# Patient Record
Sex: FEMALE | Race: WHITE | HISPANIC OR LATINO | Employment: OTHER
[De-identification: names, ages, dates, MRNs, and addresses within clinical notes are randomized per-mention and may not be internally consistent; named-entity substitution may affect disease eponyms.]

---

## 2017-03-06 ENCOUNTER — RECORDS - HEALTHEAST (OUTPATIENT)
Dept: ADMINISTRATIVE | Facility: OTHER | Age: 52
End: 2017-03-06

## 2017-03-13 ENCOUNTER — RECORDS - HEALTHEAST (OUTPATIENT)
Dept: ADMINISTRATIVE | Facility: OTHER | Age: 52
End: 2017-03-13

## 2017-03-14 ENCOUNTER — AMBULATORY - HEALTHEAST (OUTPATIENT)
Dept: SURGERY | Facility: CLINIC | Age: 52
End: 2017-03-14

## 2017-03-14 ENCOUNTER — OFFICE VISIT - HEALTHEAST (OUTPATIENT)
Dept: SURGERY | Facility: CLINIC | Age: 52
End: 2017-03-14

## 2017-03-14 DIAGNOSIS — K80.20 CHOLELITHIASIS: ICD-10-CM

## 2017-03-14 DIAGNOSIS — R79.89 ABNORMAL LFTS (LIVER FUNCTION TESTS): ICD-10-CM

## 2017-03-14 DIAGNOSIS — K80.10 CALCULUS OF GALLBLADDER WITH CHRONIC CHOLECYSTITIS WITHOUT OBSTRUCTION: ICD-10-CM

## 2017-03-15 ASSESSMENT — MIFFLIN-ST. JEOR: SCORE: 1087.14

## 2017-03-16 ENCOUNTER — ANESTHESIA - HEALTHEAST (OUTPATIENT)
Dept: SURGERY | Facility: HOSPITAL | Age: 52
End: 2017-03-16

## 2017-03-16 ENCOUNTER — SURGERY - HEALTHEAST (OUTPATIENT)
Dept: SURGERY | Facility: HOSPITAL | Age: 52
End: 2017-03-16

## 2017-03-22 ENCOUNTER — COMMUNICATION - HEALTHEAST (OUTPATIENT)
Dept: SURGERY | Facility: CLINIC | Age: 52
End: 2017-03-22

## 2017-03-23 ENCOUNTER — OFFICE VISIT - HEALTHEAST (OUTPATIENT)
Dept: SURGERY | Facility: CLINIC | Age: 52
End: 2017-03-23

## 2017-03-23 DIAGNOSIS — K74.60 CIRRHOSIS (H): ICD-10-CM

## 2017-03-23 DIAGNOSIS — Z48.89 POSTOPERATIVE VISIT: ICD-10-CM

## 2017-03-23 DIAGNOSIS — R10.11 RUQ ABDOMINAL PAIN: ICD-10-CM

## 2017-03-24 ENCOUNTER — COMMUNICATION - HEALTHEAST (OUTPATIENT)
Dept: SURGERY | Facility: CLINIC | Age: 52
End: 2017-03-24

## 2017-03-24 ENCOUNTER — RECORDS - HEALTHEAST (OUTPATIENT)
Dept: ADMINISTRATIVE | Facility: OTHER | Age: 52
End: 2017-03-24

## 2017-03-24 ENCOUNTER — HOSPITAL ENCOUNTER (OUTPATIENT)
Dept: ULTRASOUND IMAGING | Facility: HOSPITAL | Age: 52
Discharge: HOME | End: 2017-03-24
Attending: PHYSICIAN ASSISTANT

## 2017-03-24 DIAGNOSIS — K74.60 CIRRHOSIS (H): ICD-10-CM

## 2017-03-24 DIAGNOSIS — G89.18 POST-OP PAIN: ICD-10-CM

## 2017-03-27 ENCOUNTER — COMMUNICATION - HEALTHEAST (OUTPATIENT)
Dept: SURGERY | Facility: CLINIC | Age: 52
End: 2017-03-27

## 2017-03-27 ENCOUNTER — OFFICE VISIT - HEALTHEAST (OUTPATIENT)
Dept: SURGERY | Facility: CLINIC | Age: 52
End: 2017-03-27

## 2017-03-27 DIAGNOSIS — K74.60 CIRRHOSIS (H): ICD-10-CM

## 2017-03-27 DIAGNOSIS — K80.10 CALCULUS OF GALLBLADDER WITH CHRONIC CHOLECYSTITIS WITHOUT OBSTRUCTION: ICD-10-CM

## 2017-03-27 DIAGNOSIS — Z48.89 POSTOPERATIVE VISIT: ICD-10-CM

## 2017-04-07 ASSESSMENT — MIFFLIN-ST. JEOR: SCORE: 1066.73

## 2017-04-11 ENCOUNTER — SURGERY - HEALTHEAST (OUTPATIENT)
Dept: GASTROENTEROLOGY | Facility: CLINIC | Age: 52
End: 2017-04-11

## 2017-04-11 ASSESSMENT — MIFFLIN-ST. JEOR: SCORE: 1095.31

## 2017-05-26 ENCOUNTER — OFFICE VISIT - HEALTHEAST (OUTPATIENT)
Dept: SURGERY | Facility: CLINIC | Age: 52
End: 2017-05-26

## 2018-02-08 ENCOUNTER — SURGERY - HEALTHEAST (OUTPATIENT)
Dept: GASTROENTEROLOGY | Facility: CLINIC | Age: 53
End: 2018-02-08

## 2018-02-08 ASSESSMENT — MIFFLIN-ST. JEOR
SCORE: 1120.71
SCORE: 1109.37
SCORE: 1134.32

## 2018-02-20 ENCOUNTER — RECORDS - HEALTHEAST (OUTPATIENT)
Dept: LAB | Facility: CLINIC | Age: 53
End: 2018-02-20

## 2018-02-20 LAB
ALBUMIN SERPL-MCNC: 3.1 G/DL (ref 3.5–5)
ALP SERPL-CCNC: 123 U/L (ref 45–120)
ALT SERPL W P-5'-P-CCNC: 24 U/L (ref 0–45)
ANION GAP SERPL CALCULATED.3IONS-SCNC: 10 MMOL/L (ref 5–18)
AST SERPL W P-5'-P-CCNC: 58 U/L (ref 0–40)
BILIRUB SERPL-MCNC: 0.7 MG/DL (ref 0–1)
BUN SERPL-MCNC: 13 MG/DL (ref 8–22)
CALCIUM SERPL-MCNC: 8.3 MG/DL (ref 8.5–10.5)
CHLORIDE BLD-SCNC: 106 MMOL/L (ref 98–107)
CO2 SERPL-SCNC: 24 MMOL/L (ref 22–31)
CREAT SERPL-MCNC: 0.88 MG/DL (ref 0.6–1.1)
GFR SERPL CREATININE-BSD FRML MDRD: >60 ML/MIN/1.73M2
GLUCOSE BLD-MCNC: 109 MG/DL (ref 70–125)
MAGNESIUM SERPL-MCNC: 1.7 MG/DL (ref 1.8–2.6)
POTASSIUM BLD-SCNC: 3.6 MMOL/L (ref 3.5–5)
PROT SERPL-MCNC: 6.9 G/DL (ref 6–8)
SODIUM SERPL-SCNC: 140 MMOL/L (ref 136–145)

## 2018-03-23 ENCOUNTER — RECORDS - HEALTHEAST (OUTPATIENT)
Dept: ADMINISTRATIVE | Facility: OTHER | Age: 53
End: 2018-03-23

## 2018-04-02 ENCOUNTER — HOSPITAL ENCOUNTER (OUTPATIENT)
Dept: ULTRASOUND IMAGING | Facility: CLINIC | Age: 53
Discharge: HOME | End: 2018-04-02
Admitting: RADIOLOGY

## 2018-04-02 ENCOUNTER — COMMUNICATION - HEALTHEAST (OUTPATIENT)
Dept: TELEHEALTH | Facility: CLINIC | Age: 53
End: 2018-04-02

## 2018-04-02 DIAGNOSIS — R18.8 CIRRHOSIS OF LIVER WITH ASCITES, UNSPECIFIED HEPATIC CIRRHOSIS TYPE (H): ICD-10-CM

## 2018-04-02 DIAGNOSIS — K74.60 CIRRHOSIS OF LIVER WITH ASCITES, UNSPECIFIED HEPATIC CIRRHOSIS TYPE (H): ICD-10-CM

## 2018-04-10 ENCOUNTER — RECORDS - HEALTHEAST (OUTPATIENT)
Dept: LAB | Facility: CLINIC | Age: 53
End: 2018-04-10

## 2018-04-10 LAB
ALBUMIN SERPL-MCNC: 3.7 G/DL (ref 3.5–5)
ALP SERPL-CCNC: 141 U/L (ref 45–120)
ALT SERPL W P-5'-P-CCNC: 38 U/L (ref 0–45)
AST SERPL W P-5'-P-CCNC: 128 U/L (ref 0–40)
BILIRUB DIRECT SERPL-MCNC: 0.6 MG/DL
BILIRUB SERPL-MCNC: 1.6 MG/DL (ref 0–1)
PROT SERPL-MCNC: 7.4 G/DL (ref 6–8)

## 2018-05-07 ENCOUNTER — RECORDS - HEALTHEAST (OUTPATIENT)
Dept: ADMINISTRATIVE | Facility: OTHER | Age: 53
End: 2018-05-07

## 2018-05-09 ENCOUNTER — HOSPITAL ENCOUNTER (OUTPATIENT)
Dept: ULTRASOUND IMAGING | Facility: HOSPITAL | Age: 53
Discharge: HOME | End: 2018-05-09
Admitting: RADIOLOGY

## 2018-05-09 DIAGNOSIS — K70.31 ALCOHOLIC CIRRHOSIS OF LIVER WITH ASCITES (H): ICD-10-CM

## 2018-05-25 ENCOUNTER — AMBULATORY - HEALTHEAST (OUTPATIENT)
Dept: SCHEDULING | Facility: CLINIC | Age: 53
End: 2018-05-25

## 2018-05-25 DIAGNOSIS — K70.31 ALCOHOLIC CIRRHOSIS OF LIVER WITH ASCITES (H): ICD-10-CM

## 2018-05-31 ENCOUNTER — HOSPITAL ENCOUNTER (OUTPATIENT)
Dept: ULTRASOUND IMAGING | Facility: CLINIC | Age: 53
Discharge: HOME | End: 2018-05-31
Admitting: RADIOLOGY

## 2018-05-31 DIAGNOSIS — K70.31 ALCOHOLIC CIRRHOSIS OF LIVER WITH ASCITES (H): ICD-10-CM

## 2018-06-19 ENCOUNTER — RECORDS - HEALTHEAST (OUTPATIENT)
Dept: ADMINISTRATIVE | Facility: OTHER | Age: 53
End: 2018-06-19

## 2018-06-20 ENCOUNTER — RECORDS - HEALTHEAST (OUTPATIENT)
Dept: LAB | Facility: CLINIC | Age: 53
End: 2018-06-20

## 2018-06-20 ENCOUNTER — HOSPITAL ENCOUNTER (OUTPATIENT)
Dept: ULTRASOUND IMAGING | Facility: HOSPITAL | Age: 53
Discharge: HOME | End: 2018-06-20
Admitting: RADIOLOGY

## 2018-06-20 DIAGNOSIS — K70.31 ALCOHOLIC CIRRHOSIS OF LIVER WITH ASCITES (H): ICD-10-CM

## 2018-06-20 DIAGNOSIS — B18.2 CHRONIC HEPATITIS C WITHOUT HEPATIC COMA (H): ICD-10-CM

## 2018-06-20 DIAGNOSIS — E87.6 HYPOKALEMIA: ICD-10-CM

## 2018-06-20 LAB
ERYTHROCYTE [DISTWIDTH] IN BLOOD BY AUTOMATED COUNT: 19.5 % (ref 11–14.5)
HCT VFR BLD AUTO: 33.1 % (ref 35–47)
HGB BLD-MCNC: 10.9 G/DL (ref 12–16)
MCH RBC QN AUTO: 29 PG (ref 27–34)
MCHC RBC AUTO-ENTMCNC: 32.9 G/DL (ref 32–36)
MCV RBC AUTO: 88 FL (ref 80–100)
PLATELET # BLD AUTO: 84 THOU/UL (ref 140–440)
PMV BLD AUTO: 11 FL (ref 8.5–12.5)
RBC # BLD AUTO: 3.76 MILL/UL (ref 3.8–5.4)
WBC: 4.4 THOU/UL (ref 4–11)

## 2018-07-17 ENCOUNTER — HOSPITAL ENCOUNTER (OUTPATIENT)
Dept: ULTRASOUND IMAGING | Facility: HOSPITAL | Age: 53
Discharge: HOME | End: 2018-07-17
Admitting: RADIOLOGY

## 2018-07-17 DIAGNOSIS — K70.31 ALCOHOLIC CIRRHOSIS OF LIVER WITH ASCITES (H): ICD-10-CM

## 2018-08-13 ENCOUNTER — AMBULATORY - HEALTHEAST (OUTPATIENT)
Dept: SCHEDULING | Facility: CLINIC | Age: 53
End: 2018-08-13

## 2018-08-13 DIAGNOSIS — K70.31 ALCOHOLIC CIRRHOSIS OF LIVER WITH ASCITES (H): ICD-10-CM

## 2018-08-17 ENCOUNTER — HOSPITAL ENCOUNTER (OUTPATIENT)
Dept: ULTRASOUND IMAGING | Facility: HOSPITAL | Age: 53
Discharge: HOME | End: 2018-08-17
Admitting: RADIOLOGY

## 2018-08-17 DIAGNOSIS — K70.31 ALCOHOLIC CIRRHOSIS OF LIVER WITH ASCITES (H): ICD-10-CM

## 2018-09-13 ENCOUNTER — RECORDS - HEALTHEAST (OUTPATIENT)
Dept: ADMINISTRATIVE | Facility: OTHER | Age: 53
End: 2018-09-13

## 2018-09-14 ENCOUNTER — HOSPITAL ENCOUNTER (OUTPATIENT)
Dept: ULTRASOUND IMAGING | Facility: CLINIC | Age: 53
Discharge: HOME | End: 2018-09-14

## 2018-09-14 ENCOUNTER — COMMUNICATION - HEALTHEAST (OUTPATIENT)
Dept: TELEHEALTH | Facility: CLINIC | Age: 53
End: 2018-09-14

## 2018-09-14 DIAGNOSIS — K74.60 CIRRHOSIS (H): ICD-10-CM

## 2018-09-14 DIAGNOSIS — K70.31 ALCOHOLIC CIRRHOSIS OF LIVER WITH ASCITES (H): ICD-10-CM

## 2018-09-14 LAB — AFP SERPL-MCNC: 5.4 UG/ML

## 2018-12-12 ENCOUNTER — RECORDS - HEALTHEAST (OUTPATIENT)
Dept: ADMINISTRATIVE | Facility: OTHER | Age: 53
End: 2018-12-12

## 2018-12-13 ENCOUNTER — HOSPITAL ENCOUNTER (OUTPATIENT)
Dept: RADIOLOGY | Facility: CLINIC | Age: 53
Discharge: HOME | End: 2018-12-13

## 2018-12-13 ENCOUNTER — HOSPITAL ENCOUNTER (OUTPATIENT)
Dept: ULTRASOUND IMAGING | Facility: CLINIC | Age: 53
Discharge: HOME | End: 2018-12-13

## 2018-12-13 DIAGNOSIS — K70.31 ALCOHOLIC CIRRHOSIS OF LIVER WITH ASCITES (H): ICD-10-CM

## 2018-12-13 DIAGNOSIS — R07.81 RIB PAIN ON RIGHT SIDE: ICD-10-CM

## 2018-12-28 ENCOUNTER — RECORDS - HEALTHEAST (OUTPATIENT)
Dept: LAB | Facility: CLINIC | Age: 53
End: 2018-12-28

## 2018-12-28 LAB — HBA1C MFR BLD: 5.1 % (ref 4.2–6.1)

## 2019-02-22 ENCOUNTER — RECORDS - HEALTHEAST (OUTPATIENT)
Dept: LAB | Facility: CLINIC | Age: 54
End: 2019-02-22

## 2019-02-22 LAB
ALBUMIN SERPL-MCNC: 4.1 G/DL (ref 3.5–5)
ALP SERPL-CCNC: 108 U/L (ref 45–120)
ALT SERPL W P-5'-P-CCNC: 19 U/L (ref 0–45)
ANION GAP SERPL CALCULATED.3IONS-SCNC: 12 MMOL/L (ref 5–18)
AST SERPL W P-5'-P-CCNC: 37 U/L (ref 0–40)
BILIRUB SERPL-MCNC: 0.7 MG/DL (ref 0–1)
BUN SERPL-MCNC: 14 MG/DL (ref 8–22)
C REACTIVE PROTEIN LHE: <0.1 MG/DL (ref 0–0.8)
CALCIUM SERPL-MCNC: 9.2 MG/DL (ref 8.5–10.5)
CHLORIDE BLD-SCNC: 108 MMOL/L (ref 98–107)
CO2 SERPL-SCNC: 21 MMOL/L (ref 22–31)
CREAT SERPL-MCNC: 0.68 MG/DL (ref 0.6–1.1)
GFR SERPL CREATININE-BSD FRML MDRD: >60 ML/MIN/1.73M2
GLUCOSE BLD-MCNC: 96 MG/DL (ref 70–125)
POTASSIUM BLD-SCNC: 3.9 MMOL/L (ref 3.5–5)
PROT SERPL-MCNC: 7.4 G/DL (ref 6–8)
RHEUMATOID FACT SERPL-ACNC: 54.9 IU/ML (ref 0–30)
SODIUM SERPL-SCNC: 141 MMOL/L (ref 136–145)

## 2019-02-25 LAB — ANA SER QL: 4.2 U

## 2019-02-26 LAB
DNA (DS) ANTIBODY - HISTORICAL: 16 IU
JO-1 AUTOANTIBODIES - HISTORICAL: 1 EU
SCL-70 AUTOANTIBODIES - HISTORICAL: 1 EU
SM (SMITH AUTOANTIBODIES - HISTORICAL: 3 EU
SM/RNP AUTOANTIBODIES - HISTORICAL: 1 EU
SS-A/RO AUTOANTIBODIES - HISTORICAL: 17 EU
SS-B/LA AUTOANTIBODIES - HISTORICAL: 0 EU

## 2019-03-22 ENCOUNTER — RECORDS - HEALTHEAST (OUTPATIENT)
Dept: LAB | Facility: CLINIC | Age: 54
End: 2019-03-22

## 2019-03-22 LAB — AMMONIA PLAS-SCNC: 57 UMOL/L (ref 11–35)

## 2019-04-24 ENCOUNTER — AMBULATORY - HEALTHEAST (OUTPATIENT)
Dept: SCHEDULING | Facility: CLINIC | Age: 54
End: 2019-04-24

## 2019-04-24 DIAGNOSIS — K70.31 ALCOHOLIC CIRRHOSIS OF LIVER WITH ASCITES (H): ICD-10-CM

## 2019-05-02 ENCOUNTER — HOSPITAL ENCOUNTER (OUTPATIENT)
Dept: ULTRASOUND IMAGING | Facility: CLINIC | Age: 54
Discharge: HOME | End: 2019-05-02

## 2019-05-02 DIAGNOSIS — K70.31 ALCOHOLIC CIRRHOSIS OF LIVER WITH ASCITES (H): ICD-10-CM

## 2019-05-20 ENCOUNTER — HOSPITAL ENCOUNTER (OUTPATIENT)
Dept: ULTRASOUND IMAGING | Facility: CLINIC | Age: 54
Discharge: HOME | End: 2019-05-20

## 2019-05-20 DIAGNOSIS — K70.31 ALCOHOLIC CIRRHOSIS OF LIVER WITH ASCITES (H): ICD-10-CM

## 2020-10-16 ENCOUNTER — RECORDS - HEALTHEAST (OUTPATIENT)
Dept: LAB | Facility: CLINIC | Age: 55
End: 2020-10-16

## 2020-10-16 LAB — AMMONIA PLAS-SCNC: 98 UMOL/L (ref 11–35)

## 2021-05-30 VITALS — WEIGHT: 116.6 LBS | BODY MASS INDEX: 21.46 KG/M2 | HEIGHT: 62 IN

## 2021-05-30 VITALS — BODY MASS INDEX: 21.79 KG/M2 | WEIGHT: 118.4 LBS | HEIGHT: 62 IN

## 2021-05-30 VITALS — WEIGHT: 120.7 LBS

## 2021-05-31 VITALS — HEIGHT: 62 IN | BODY MASS INDEX: 22.36 KG/M2 | WEIGHT: 121.5 LBS

## 2021-06-09 NOTE — PROGRESS NOTES
HPI:  This is a 51-year-old woman who I am asked to see by Dr. Yasmin Figueroa  for evaluation of abdominal pain. The pain is located in the epigastric region to right upper quadrant with radiation to back.  Pain is described as dull.  She has had on and off symptoms going back 8 years but over the last 2 weeks the pain has been fairly constant.  Aggravating factors include eating.  She denies any fevers.  She also states that she has been itchy.  She has noticed that her urine is dark in her stools have become more of a yellow versus brown color.  On an ultrasound they see stones in her gallbladder and a normal common bile duct.  Her bilirubin is elevated to 2.2.    Please see Chart Review for PMH, medication list, allergies, FH and social history.  Past Medical History:   Diagnosis Date     Anxiety      Hypertension      UTI (urinary tract infection)     currently on antibiotic       Current Outpatient Prescriptions:      amLODIPine (NORVASC) 5 MG tablet, Take 5 mg by mouth daily., Disp: , Rfl:      ciprofloxacin HCl (CIPRO) 250 MG tablet, Take 250 mg by mouth 2 (two) times a day., Disp: , Rfl:      gabapentin (NEURONTIN) 100 MG capsule, , Disp: , Rfl: 0     gabapentin (NEURONTIN) 100 MG capsule, Take 100 mg by mouth 2 (two) times a day as needed., Disp: , Rfl:      sertraline (ZOLOFT) 100 MG tablet, Take 100 mg by mouth bedtime., Disp: , Rfl:   No Known Allergies          A 12 point comprehensive review of systems was negative except as noted.    Physical Exam:  Visit Vitals     BP (!) 168/100     Wt 120 lb 11.2 oz (54.7 kg)     SpO2 98%     Breastfeeding No       General:alert, appears stated age and cooperative  Eyes: No obvious scleral icterus.  Lungs: clear to auscultation bilaterally  CV:regular rate and rhythm, S1, S2 normal, no murmur, click, rub or gallop  Abdomen:Soft, mild tenderness in the right upper quadrant but no guarding or rebound.  Neuro: Grossly normal    Studies:  No results found for: WBC,  HGB, HCT, MCV, PLT  Lab Results   Component Value Date    ALT 45 03/06/2017    AST 78 (H) 03/06/2017    ALKPHOS 141 (H) 03/06/2017    BILITOT 2.2 (H) 03/06/2017         Impression:    Cholelithiasis with probable chronic cholecystitis but I am very worried about choledocholithiasis.  Because her common bile duct is normal on the ultrasound and her bilirubin is only mildly elevated, I doubt very much that gastroenterology will be interested in doing an ERCP first.  I do not think the MRCP is accurate enough so I would recommend just going ahead with a lap mp but doing an intraoperative cholangiogram.  Explained that if it does show a stone in the common bile duct and she would likely need to be admitted for an ERCP.  If the common bile duct is normal then this could be done as an outpatient.  Risks of surgery including beeding, infection, bile leak and common bile duct injury were explained along with potential benefits. Appropriate questions were asked and answered and they wish to proceed. Typically and outpatient procedure.    Plan:  Laparoscopic Cholecystectomy.  My schedule unfortunately will not allow me to do this for at least a couple weeks.  Dr. Maximiliano Ramirez has agreed to do the procedure.  I have discussed the patient with him.  I explained this to the patient and explained that this needs to be done sooner than later.  We plan to get her on the schedule this week.

## 2021-06-09 NOTE — PROGRESS NOTES
HPI: Pt is here for follow up of a lap cholecystectomy and cholangiogram done by Dr Ramirez  3/17/17. Having increasing pain ruq . Pain constant. Worse with movement. No problems with constipation, diarrhea, fever chills, or eating. Having a lot of drainage out of her upper midline port. During her surgery it was found that she had a cirrhotic liver. Prior to this she went to treatment for alcoholism last November. Her liver function tests were normal. Has not drank since.     BP (!) 144/97 (Patient Site: Left Arm, Patient Position: Sitting, Cuff Size: Adult Regular)  Pulse 81  SpO2 99%  Breastfeeding? No    EXAM:  GENERAL:Appears well  ABDOMEN:  Distended. Tender ruq. Mild guarding. No rebound  SURGICAL WOUNDS:  Incisions healing well, no enduration or drainage.- ascites draining out edge of her upper midline incision    .lastlab[CASEREPORT    Assessment/Plan: .RUQ ultrasound. Lfts, paracentesis ordered. Ok tramadol for pain. GI consult for liver specialist.   Fariha Chavez PA-C  [facility] Department of Surgery

## 2021-06-09 NOTE — ANESTHESIA CARE TRANSFER NOTE
Last vitals:   Vitals:    03/16/17 1320   BP: 142/85   Pulse: 80   Resp: 20   Temp: 36.4  C (97.6  F)   SpO2: 99%     Patient's level of consciousness is drowsy  Spontaneous respirations: yes  Maintains airway independently: yes  Dentition unchanged: yes  Oropharynx: oropharynx clear of all foreign objects    QCDR Measures:  ASA# 20 - Surgical Safety Checklist: ASA20A - Safety Checks Done  PQRS# 430 - Adult PONV Prevention: 4558F - Pt received => 2 anti-emetic agents (different classes) preop & intraop  ASA# 8 - Peds PONV Prevention: NA - Not pediatric patient, not GA or 2 or more risk factors NOT present  PQRS# 424 - Shalini-op Temp Management: 4559F - At least one body temp DOCUMENTED => 35.5C or 95.9F within required timeframe  PQRS# 426 - PACU Transfer Protocol: - Transfer of care checklist used  ASA# 14 - Acute Post-op Pain: ASA14B - Patient did NOT experience pain >= 7 out of 10    I completed my SBAR handoff to the receiving nurse per policy and procedure.

## 2021-06-09 NOTE — ANESTHESIA POSTPROCEDURE EVALUATION
Patient: Lori Jean-Baptiste  LAPAROSCOPIC CHOLECYSTECTOMY, , INTRA-OPERATIVE CHOLANGIOGRAMS  Anesthesia type: general    Patient location: PACU  Last vitals:   Vitals:    03/16/17 1330   BP: 131/75   Pulse: 79   Resp: 18   Temp:    SpO2: 99%     Post vital signs: stable  Level of consciousness: awake and responds to simple questions  Post-anesthesia pain: pain controlled  Post-anesthesia nausea and vomiting: no  Pulmonary: unassisted, return to baseline  Cardiovascular: stable and blood pressure at baseline  Hydration: adequate  Anesthetic events: no    QCDR Measures:  ASA# 11 - Shalini-op Cardiac Arrest: ASA11B - Patient did NOT experience unanticipated cardiac arrest  ASA# 12 - Shalini-op Mortality Rate: ASA12B - Patient did NOT die  ASA# 13 - PACU Re-Intubation Rate: ASA13B - Patient did NOT require a new airway mgmt  ASA# 10 - Composite Anes Safety: ASA10A - No serious adverse event  ASA# 38 - New Corneal Injury: ASA38A - No new exposure keratitis or corneal abrasion in PACU    Additional Notes:

## 2021-06-09 NOTE — PROGRESS NOTES
Scheduled pt for lap mp with intraoperative cholangiograms with Dr Jesus Ramirez at [place] 3/16/17.   Per Dr Gonzales, pt saw Dr Figueroa ([facility]) last week and we can use that as H&P, reviewed preop instructions including NPO 8hrs prior,  need for  and adult to stay with pt for first 24 hrs postop.  Pt verbalized understanding and had no further questions.  Encouraged to call if needed.

## 2021-06-09 NOTE — ANESTHESIA PREPROCEDURE EVALUATION
Anesthesia Evaluation      Patient summary reviewed   No history of anesthetic complications     Airway   Mallampati: II  Neck ROM: full   Pulmonary - normal exam    breath sounds clear to auscultation  (+) a smoker  (-) shortness of breath, sleep apnea                         Cardiovascular   Exercise tolerance: > or = 4 METS  (+) hypertension, ,     (-) angina, murmur  Rhythm: regular  Rate: normal,    no murmur      Neuro/Psych    (+) anxiety/panic attacks,     Endo/Other - negative ROS   (-) not pregnant     GI/Hepatic/Renal    (+) GERD intermittent and well controlled,             Dental - normal exam                        Anesthesia Plan  Planned anesthetic: general endotracheal    ASA 2   Induction: intravenous   Anesthetic plan and risks discussed with: patient and child/children  Anesthesia plan special considerations: antiemetics,   Post-op plan: routine recovery

## 2021-06-09 NOTE — PROGRESS NOTES
HPI: Pt is here for follow up of a lap cholecystectomy with post operative ascites due to cirrhosis. .   she is doing well.  Pain is well controlled.  No difficulties with the surgical wound/wounds.  she is eating well and denies fever and chills.   Is still having significant drainage of ascites fluid.       /78  Pulse 82  Temp 96.7  F (35.9  C)  SpO2 99%    EXAM:  GENERAL:Appears well  ABDOMEN:  Soft, +BS  SURGICAL WOUNDS:  Incisions healing well, no enduration or drainage.    .lastlab[CASEREPORT    Assessment/Plan: . Doing well after surgery and should follow up as needed. Reviewed her labs and ultrasound. Recommended GI referral.   Fariha Chavez PA-C  [facility] Department of Surgery

## 2021-06-10 NOTE — PROGRESS NOTES
Lori Jean-Baptiste is status post laparoscopic cholecystectomy. She is doing well but notes a small fluid-filled collection below her subxiphoid port site incision.  She denies any purulent drainage, fevers, chills or any other symptoms.    EXAM:  /85 (Patient Site: Right Arm, Patient Position: Sitting, Cuff Size: Adult Regular)  Pulse 63  SpO2 97%  Breastfeeding? No  GENERAL: Well developed female, No acute distress, pleasant and conversant   EYES: Pupils equal, round and reactive, no scleral icterus  ABDOMEN: Soft, nontender, small 3 cm fluid-filled region below subxiphoid port site incision, no cellulitis or stigmata of infection  SKIN: Pink, warm and dry, no obvious rashes or lesions   NEURO:No focal deficits, ambulatory  MUSCULOSKELETAL:No obvious deformities, no swelling, normal appearing        ASSESSMENT AND PLAN:  Lori Jean-Baptiste is doing well postoperatively.  I have drained a small seroma using local anesthetic and scalpel.  Several cc of clear fluid were expressed and the wound was packed.  Her  was present and educated on daily dressing changes and I expect the wound to heal without complications.  She will follow-up with me in 2 weeks to ensure adequate closure.    Maximiliano Ramirez D.O. FACS  [phone]  [facility] Department of Surgery

## 2021-06-15 PROBLEM — D68.9 COAGULOPATHY (H): Status: ACTIVE | Noted: 2017-04-07

## 2021-06-15 PROBLEM — K70.31 ALCOHOLIC CIRRHOSIS OF LIVER WITH ASCITES (H): Status: ACTIVE | Noted: 2017-04-07

## 2021-06-15 PROBLEM — K56.1 INTUSSUSCEPTION OF SMALL BOWEL (H): Status: ACTIVE | Noted: 2017-04-07

## 2021-06-15 PROBLEM — R10.84 ACUTE GENERALIZED ABDOMINAL PAIN: Status: ACTIVE | Noted: 2017-04-07

## 2021-06-16 PROBLEM — K92.0 GASTROINTESTINAL HEMORRHAGE WITH HEMATEMESIS: Status: ACTIVE | Noted: 2018-02-08

## 2021-06-16 PROBLEM — K92.0 HEMATEMESIS: Status: ACTIVE | Noted: 2018-02-08

## 2021-06-19 NOTE — PROGRESS NOTES
Pt arrived via ambulation s/p para with 3.9 removed per tech. No compaints offered. Pt here for albumin replacement

## 2021-07-03 NOTE — ADDENDUM NOTE
Addendum Note by Solitario Kent at 9/14/2018 10:01 AM     Author: Solitario Kent Service: -- Author Type:     Filed: 9/14/2018 10:01 AM Encounter Date: 9/14/2018 Status: Signed    : Solitario Kent ()    Addended by: SOLITARIO KENT on: 9/14/2018 10:01 AM        Modules accepted: Orders

## 2021-08-03 PROBLEM — K56.609 SMALL BOWEL OBSTRUCTION (H): Status: RESOLVED | Noted: 2017-04-07 | Resolved: 2017-04-12

## 2021-08-11 ENCOUNTER — LAB REQUISITION (OUTPATIENT)
Dept: LAB | Facility: CLINIC | Age: 56
End: 2021-08-11
Payer: COMMERCIAL

## 2021-08-11 DIAGNOSIS — R35.0 FREQUENCY OF MICTURITION: ICD-10-CM

## 2021-08-11 PROCEDURE — 87088 URINE BACTERIA CULTURE: CPT | Mod: ORL | Performed by: PHYSICIAN ASSISTANT

## 2021-08-15 LAB — BACTERIA UR CULT: ABNORMAL

## 2021-12-29 ENCOUNTER — LAB REQUISITION (OUTPATIENT)
Dept: LAB | Facility: CLINIC | Age: 56
End: 2021-12-29
Payer: COMMERCIAL

## 2021-12-29 DIAGNOSIS — I10 ESSENTIAL (PRIMARY) HYPERTENSION: ICD-10-CM

## 2021-12-29 DIAGNOSIS — K70.31 ALCOHOLIC CIRRHOSIS OF LIVER WITH ASCITES (H): ICD-10-CM

## 2021-12-29 PROCEDURE — 80053 COMPREHEN METABOLIC PANEL: CPT | Mod: ORL | Performed by: PHYSICIAN ASSISTANT

## 2021-12-29 PROCEDURE — 83690 ASSAY OF LIPASE: CPT | Mod: ORL | Performed by: PHYSICIAN ASSISTANT

## 2021-12-30 LAB
ALBUMIN SERPL-MCNC: 3.4 G/DL (ref 3.5–5)
ALP SERPL-CCNC: 109 U/L (ref 45–120)
ALT SERPL W P-5'-P-CCNC: 30 U/L (ref 0–45)
ANION GAP SERPL CALCULATED.3IONS-SCNC: 14 MMOL/L (ref 5–18)
AST SERPL W P-5'-P-CCNC: 84 U/L (ref 0–40)
BILIRUB SERPL-MCNC: 1.2 MG/DL (ref 0–1)
BUN SERPL-MCNC: 24 MG/DL (ref 8–22)
CALCIUM SERPL-MCNC: 9.3 MG/DL (ref 8.5–10.5)
CHLORIDE BLD-SCNC: 105 MMOL/L (ref 98–107)
CO2 SERPL-SCNC: 21 MMOL/L (ref 22–31)
CREAT SERPL-MCNC: 0.7 MG/DL (ref 0.6–1.1)
GFR SERPL CREATININE-BSD FRML MDRD: >90 ML/MIN/1.73M2
GLUCOSE BLD-MCNC: 117 MG/DL (ref 70–125)
LIPASE SERPL-CCNC: 20 U/L (ref 0–52)
POTASSIUM BLD-SCNC: 4.2 MMOL/L (ref 3.5–5)
PROT SERPL-MCNC: 6.4 G/DL (ref 6–8)
SODIUM SERPL-SCNC: 140 MMOL/L (ref 136–145)

## 2022-01-01 ENCOUNTER — LAB REQUISITION (OUTPATIENT)
Dept: LAB | Facility: CLINIC | Age: 57
End: 2022-01-01
Payer: MEDICARE

## 2022-01-01 DIAGNOSIS — K70.31 ALCOHOLIC CIRRHOSIS OF LIVER WITH ASCITES (H): ICD-10-CM

## 2022-01-01 LAB
ALBUMIN SERPL BCG-MCNC: 2.8 G/DL (ref 3.5–5.2)
ALP SERPL-CCNC: 201 U/L (ref 35–104)
ALT SERPL W P-5'-P-CCNC: 62 U/L (ref 10–35)
ANION GAP SERPL CALCULATED.3IONS-SCNC: 13 MMOL/L (ref 7–15)
AST SERPL W P-5'-P-CCNC: 151 U/L (ref 10–35)
BILIRUB SERPL-MCNC: 6.2 MG/DL
BUN SERPL-MCNC: 5.9 MG/DL (ref 6–20)
CALCIUM SERPL-MCNC: 8.1 MG/DL (ref 8.6–10)
CHLORIDE SERPL-SCNC: 93 MMOL/L (ref 98–107)
CREAT SERPL-MCNC: 0.67 MG/DL (ref 0.51–0.95)
DEPRECATED HCO3 PLAS-SCNC: 27 MMOL/L (ref 22–29)
GFR SERPL CREATININE-BSD FRML MDRD: >90 ML/MIN/1.73M2
GLUCOSE SERPL-MCNC: 124 MG/DL (ref 70–99)
POTASSIUM SERPL-SCNC: 3.1 MMOL/L (ref 3.4–5.3)
PROT SERPL-MCNC: 5.7 G/DL (ref 6.4–8.3)
SODIUM SERPL-SCNC: 133 MMOL/L (ref 136–145)

## 2022-01-01 PROCEDURE — 80053 COMPREHEN METABOLIC PANEL: CPT | Mod: ORL | Performed by: PHYSICIAN ASSISTANT

## 2022-06-29 ENCOUNTER — LAB REQUISITION (OUTPATIENT)
Dept: LAB | Facility: CLINIC | Age: 57
End: 2022-06-29
Payer: MEDICARE

## 2022-06-29 DIAGNOSIS — K70.31 ALCOHOLIC CIRRHOSIS OF LIVER WITH ASCITES (H): ICD-10-CM

## 2022-06-29 LAB
ALBUMIN SERPL BCG-MCNC: 2.9 G/DL (ref 3.5–5.2)
ALP SERPL-CCNC: 118 U/L (ref 35–104)
ALT SERPL W P-5'-P-CCNC: 39 U/L (ref 10–35)
ANION GAP SERPL CALCULATED.3IONS-SCNC: 11 MMOL/L (ref 7–15)
AST SERPL W P-5'-P-CCNC: 63 U/L (ref 10–35)
BILIRUB SERPL-MCNC: 1.3 MG/DL
BUN SERPL-MCNC: 6.1 MG/DL (ref 6–20)
CALCIUM SERPL-MCNC: 7.7 MG/DL (ref 8.6–10)
CHLORIDE SERPL-SCNC: 102 MMOL/L (ref 98–107)
CREAT SERPL-MCNC: 0.73 MG/DL (ref 0.51–0.95)
DEPRECATED HCO3 PLAS-SCNC: 23 MMOL/L (ref 22–29)
GFR SERPL CREATININE-BSD FRML MDRD: >90 ML/MIN/1.73M2
GLUCOSE SERPL-MCNC: 107 MG/DL (ref 70–99)
POTASSIUM SERPL-SCNC: 3.1 MMOL/L (ref 3.4–5.3)
PROT SERPL-MCNC: 5.3 G/DL (ref 6.4–8.3)
SODIUM SERPL-SCNC: 136 MMOL/L (ref 136–145)

## 2022-06-29 PROCEDURE — 80053 COMPREHEN METABOLIC PANEL: CPT | Mod: ORL | Performed by: PHYSICIAN ASSISTANT

## 2022-07-02 ENCOUNTER — APPOINTMENT (OUTPATIENT)
Dept: RADIOLOGY | Facility: HOSPITAL | Age: 57
DRG: 433 | End: 2022-07-02
Attending: FAMILY MEDICINE
Payer: MEDICARE

## 2022-07-02 ENCOUNTER — APPOINTMENT (OUTPATIENT)
Dept: ULTRASOUND IMAGING | Facility: HOSPITAL | Age: 57
DRG: 433 | End: 2022-07-02
Attending: FAMILY MEDICINE
Payer: MEDICARE

## 2022-07-02 ENCOUNTER — HOSPITAL ENCOUNTER (INPATIENT)
Facility: HOSPITAL | Age: 57
LOS: 3 days | Discharge: HOME | DRG: 433 | End: 2022-07-05
Attending: FAMILY MEDICINE | Admitting: INTERNAL MEDICINE
Payer: MEDICARE

## 2022-07-02 DIAGNOSIS — R60.0 BILATERAL LEG EDEMA: ICD-10-CM

## 2022-07-02 DIAGNOSIS — K70.31 ALCOHOLIC CIRRHOSIS OF LIVER WITH ASCITES (H): ICD-10-CM

## 2022-07-02 DIAGNOSIS — E87.6 HYPOKALEMIA: ICD-10-CM

## 2022-07-02 DIAGNOSIS — E83.42 HYPOMAGNESEMIA: ICD-10-CM

## 2022-07-02 DIAGNOSIS — K92.0 HEMATEMESIS, PRESENCE OF NAUSEA NOT SPECIFIED: Primary | ICD-10-CM

## 2022-07-02 LAB
ABO/RH(D): NORMAL
ALBUMIN SERPL-MCNC: 2.5 G/DL (ref 3.5–5)
ALBUMIN UR-MCNC: NEGATIVE MG/DL
ALP SERPL-CCNC: 113 U/L (ref 45–120)
ALT SERPL W P-5'-P-CCNC: 32 U/L (ref 0–45)
ANION GAP SERPL CALCULATED.3IONS-SCNC: 14 MMOL/L (ref 5–18)
ANTIBODY SCREEN: NEGATIVE
APPEARANCE FLD: CLEAR
APPEARANCE UR: CLEAR
AST SERPL W P-5'-P-CCNC: 72 U/L (ref 0–40)
BASOPHILS # BLD AUTO: 0 10E3/UL (ref 0–0.2)
BASOPHILS NFR BLD AUTO: 0 %
BILIRUB DIRECT SERPL-MCNC: 0.9 MG/DL
BILIRUB SERPL-MCNC: 1.9 MG/DL (ref 0–1)
BILIRUB UR QL STRIP: NEGATIVE
BNP SERPL-MCNC: 62 PG/ML (ref 0–80)
BUN SERPL-MCNC: 5 MG/DL (ref 8–22)
CALCIUM SERPL-MCNC: 7.9 MG/DL (ref 8.5–10.5)
CELL COUNT BODY FLUID SOURCE: NORMAL
CHLORIDE BLD-SCNC: 101 MMOL/L (ref 98–107)
CO2 SERPL-SCNC: 21 MMOL/L (ref 22–31)
COLOR FLD: YELLOW
COLOR UR AUTO: ABNORMAL
CREAT SERPL-MCNC: 0.62 MG/DL (ref 0.6–1.1)
EOSINOPHIL # BLD AUTO: 0 10E3/UL (ref 0–0.7)
EOSINOPHIL NFR BLD AUTO: 0 %
ERYTHROCYTE [DISTWIDTH] IN BLOOD BY AUTOMATED COUNT: 17.5 % (ref 10–15)
GFR SERPL CREATININE-BSD FRML MDRD: >90 ML/MIN/1.73M2
GLUCOSE BLD-MCNC: 95 MG/DL (ref 70–125)
GLUCOSE BLDC GLUCOMTR-MCNC: 104 MG/DL (ref 70–99)
GLUCOSE UR STRIP-MCNC: NEGATIVE MG/DL
GRAM STAIN RESULT: NORMAL
GRAM STAIN RESULT: NORMAL
HCT VFR BLD AUTO: 23.7 % (ref 35–47)
HGB BLD-MCNC: 7.6 G/DL (ref 11.7–15.7)
HGB UR QL STRIP: NEGATIVE
IMM GRANULOCYTES # BLD: 0 10E3/UL
IMM GRANULOCYTES NFR BLD: 0 %
INR PPP: 1.29 (ref 0.85–1.15)
KETONES UR STRIP-MCNC: NEGATIVE MG/DL
LACTATE SERPL-SCNC: 4 MMOL/L (ref 0.7–2)
LEUKOCYTE ESTERASE UR QL STRIP: NEGATIVE
LYMPHOCYTES # BLD AUTO: 0.8 10E3/UL (ref 0.8–5.3)
LYMPHOCYTES NFR BLD AUTO: 19 %
LYMPHOCYTES NFR FLD MANUAL: 1 %
MAGNESIUM SERPL-MCNC: 1.2 MG/DL (ref 1.8–2.6)
MCH RBC QN AUTO: 28.3 PG (ref 26.5–33)
MCHC RBC AUTO-ENTMCNC: 32.1 G/DL (ref 31.5–36.5)
MCV RBC AUTO: 88 FL (ref 78–100)
MONOCYTES # BLD AUTO: 0.5 10E3/UL (ref 0–1.3)
MONOCYTES NFR BLD AUTO: 12 %
MONOS+MACROS NFR FLD MANUAL: 99 %
MUCOUS THREADS #/AREA URNS LPF: PRESENT /LPF
NEUTROPHILS # BLD AUTO: 2.8 10E3/UL (ref 1.6–8.3)
NEUTROPHILS NFR BLD AUTO: 69 %
NEUTS BAND NFR FLD MANUAL: NORMAL %
NITRATE UR QL: NEGATIVE
NRBC # BLD AUTO: 0 10E3/UL
NRBC BLD AUTO-RTO: 0 /100
PH UR STRIP: 7 [PH] (ref 5–7)
PLATELET # BLD AUTO: 143 10E3/UL (ref 150–450)
POTASSIUM BLD-SCNC: 2.3 MMOL/L (ref 3.5–5)
POTASSIUM BLD-SCNC: 2.4 MMOL/L (ref 3.5–5)
PROT SERPL-MCNC: 5.7 G/DL (ref 6–8)
RBC # BLD AUTO: 2.69 10E6/UL (ref 3.8–5.2)
RBC # FLD: 39 /UL
RBC URINE: 0 /HPF
SARS-COV-2 RNA RESP QL NAA+PROBE: NEGATIVE
SODIUM SERPL-SCNC: 136 MMOL/L (ref 136–145)
SP GR UR STRIP: 1.01 (ref 1–1.03)
SPECIMEN EXPIRATION DATE: NORMAL
SQUAMOUS EPITHELIAL: <1 /HPF
UROBILINOGEN UR STRIP-MCNC: <2 MG/DL
WBC # BLD AUTO: 4.1 10E3/UL (ref 4–11)
WBC # FLD AUTO: 141 /UL
WBC URINE: <1 /HPF

## 2022-07-02 PROCEDURE — C9803 HOPD COVID-19 SPEC COLLECT: HCPCS

## 2022-07-02 PROCEDURE — 71046 X-RAY EXAM CHEST 2 VIEWS: CPT

## 2022-07-02 PROCEDURE — 85610 PROTHROMBIN TIME: CPT | Performed by: FAMILY MEDICINE

## 2022-07-02 PROCEDURE — 99285 EMERGENCY DEPT VISIT HI MDM: CPT | Mod: 25

## 2022-07-02 PROCEDURE — 83735 ASSAY OF MAGNESIUM: CPT | Performed by: FAMILY MEDICINE

## 2022-07-02 PROCEDURE — 120N000001 HC R&B MED SURG/OB

## 2022-07-02 PROCEDURE — 82248 BILIRUBIN DIRECT: CPT | Performed by: FAMILY MEDICINE

## 2022-07-02 PROCEDURE — 84132 ASSAY OF SERUM POTASSIUM: CPT | Performed by: INTERNAL MEDICINE

## 2022-07-02 PROCEDURE — 0W9G3ZZ DRAINAGE OF PERITONEAL CAVITY, PERCUTANEOUS APPROACH: ICD-10-PCS | Performed by: RADIOLOGY

## 2022-07-02 PROCEDURE — 76705 ECHO EXAM OF ABDOMEN: CPT

## 2022-07-02 PROCEDURE — 272N000706 US PARACENTESIS WITHOUT ALBUMIN

## 2022-07-02 PROCEDURE — 36415 COLL VENOUS BLD VENIPUNCTURE: CPT | Performed by: INTERNAL MEDICINE

## 2022-07-02 PROCEDURE — 83880 ASSAY OF NATRIURETIC PEPTIDE: CPT | Performed by: FAMILY MEDICINE

## 2022-07-02 PROCEDURE — 86923 COMPATIBILITY TEST ELECTRIC: CPT

## 2022-07-02 PROCEDURE — 87015 SPECIMEN INFECT AGNT CONCNTJ: CPT | Performed by: FAMILY MEDICINE

## 2022-07-02 PROCEDURE — 85025 COMPLETE CBC W/AUTO DIFF WBC: CPT | Performed by: FAMILY MEDICINE

## 2022-07-02 PROCEDURE — 96365 THER/PROPH/DIAG IV INF INIT: CPT

## 2022-07-02 PROCEDURE — C9113 INJ PANTOPRAZOLE SODIUM, VIA: HCPCS | Performed by: FAMILY MEDICINE

## 2022-07-02 PROCEDURE — 250N000011 HC RX IP 250 OP 636: Performed by: FAMILY MEDICINE

## 2022-07-02 PROCEDURE — 250N000013 HC RX MED GY IP 250 OP 250 PS 637: Performed by: FAMILY MEDICINE

## 2022-07-02 PROCEDURE — 81001 URINALYSIS AUTO W/SCOPE: CPT | Performed by: FAMILY MEDICINE

## 2022-07-02 PROCEDURE — 87070 CULTURE OTHR SPECIMN AEROBIC: CPT | Performed by: FAMILY MEDICINE

## 2022-07-02 PROCEDURE — 93005 ELECTROCARDIOGRAM TRACING: CPT | Performed by: FAMILY MEDICINE

## 2022-07-02 PROCEDURE — 86850 RBC ANTIBODY SCREEN: CPT | Performed by: FAMILY MEDICINE

## 2022-07-02 PROCEDURE — 96368 THER/DIAG CONCURRENT INF: CPT

## 2022-07-02 PROCEDURE — 49083 ABD PARACENTESIS W/IMAGING: CPT

## 2022-07-02 PROCEDURE — 87635 SARS-COV-2 COVID-19 AMP PRB: CPT | Performed by: FAMILY MEDICINE

## 2022-07-02 PROCEDURE — 96375 TX/PRO/DX INJ NEW DRUG ADDON: CPT

## 2022-07-02 PROCEDURE — 99223 1ST HOSP IP/OBS HIGH 75: CPT | Mod: AI | Performed by: INTERNAL MEDICINE

## 2022-07-02 PROCEDURE — 86923 COMPATIBILITY TEST ELECTRIC: CPT | Performed by: INTERNAL MEDICINE

## 2022-07-02 PROCEDURE — 36415 COLL VENOUS BLD VENIPUNCTURE: CPT | Performed by: FAMILY MEDICINE

## 2022-07-02 PROCEDURE — 250N000013 HC RX MED GY IP 250 OP 250 PS 637: Performed by: INTERNAL MEDICINE

## 2022-07-02 PROCEDURE — 89051 BODY FLUID CELL COUNT: CPT | Performed by: FAMILY MEDICINE

## 2022-07-02 PROCEDURE — 83605 ASSAY OF LACTIC ACID: CPT | Performed by: INTERNAL MEDICINE

## 2022-07-02 RX ORDER — POTASSIUM CHLORIDE 7.45 MG/ML
10 INJECTION INTRAVENOUS ONCE
Status: COMPLETED | OUTPATIENT
Start: 2022-07-02 | End: 2022-07-02

## 2022-07-02 RX ORDER — POTASSIUM CHLORIDE 20MEQ/15ML
40 LIQUID (ML) ORAL ONCE
Status: COMPLETED | OUTPATIENT
Start: 2022-07-02 | End: 2022-07-02

## 2022-07-02 RX ORDER — ZOLPIDEM TARTRATE 5 MG/1
5 TABLET ORAL
Status: ON HOLD | COMMUNITY
End: 2023-01-01

## 2022-07-02 RX ORDER — FUROSEMIDE 10 MG/ML
40 INJECTION INTRAMUSCULAR; INTRAVENOUS ONCE
Status: COMPLETED | OUTPATIENT
Start: 2022-07-02 | End: 2022-07-02

## 2022-07-02 RX ORDER — ACETAMINOPHEN 325 MG/1
650 TABLET ORAL EVERY 6 HOURS PRN
Status: DISCONTINUED | OUTPATIENT
Start: 2022-07-02 | End: 2022-07-05 | Stop reason: HOSPADM

## 2022-07-02 RX ORDER — ACETAMINOPHEN 650 MG/1
650 SUPPOSITORY RECTAL EVERY 6 HOURS PRN
Status: DISCONTINUED | OUTPATIENT
Start: 2022-07-02 | End: 2022-07-05 | Stop reason: HOSPADM

## 2022-07-02 RX ORDER — METOPROLOL SUCCINATE 25 MG/1
25 TABLET, EXTENDED RELEASE ORAL DAILY
Status: DISCONTINUED | OUTPATIENT
Start: 2022-07-02 | End: 2022-07-04

## 2022-07-02 RX ORDER — METOPROLOL SUCCINATE 25 MG/1
1 TABLET, EXTENDED RELEASE ORAL DAILY
Status: ON HOLD | COMMUNITY
End: 2022-07-05

## 2022-07-02 RX ORDER — MAGNESIUM SULFATE 4 G/50ML
4 INJECTION INTRAVENOUS ONCE
Status: COMPLETED | OUTPATIENT
Start: 2022-07-02 | End: 2022-07-02

## 2022-07-02 RX ORDER — POTASSIUM CHLORIDE 1500 MG/1
20 TABLET, EXTENDED RELEASE ORAL ONCE
Status: COMPLETED | OUTPATIENT
Start: 2022-07-03 | End: 2022-07-03

## 2022-07-02 RX ORDER — CLONIDINE HYDROCHLORIDE 0.1 MG/1
0.1 TABLET ORAL AT BEDTIME
COMMUNITY
Start: 2022-06-23 | End: 2023-01-01

## 2022-07-02 RX ORDER — POTASSIUM CHLORIDE 1500 MG/1
40 TABLET, EXTENDED RELEASE ORAL ONCE
Status: COMPLETED | OUTPATIENT
Start: 2022-07-02 | End: 2022-07-02

## 2022-07-02 RX ORDER — ONDANSETRON 2 MG/ML
4 INJECTION INTRAMUSCULAR; INTRAVENOUS EVERY 6 HOURS PRN
Status: DISCONTINUED | OUTPATIENT
Start: 2022-07-02 | End: 2022-07-05 | Stop reason: HOSPADM

## 2022-07-02 RX ORDER — ONDANSETRON 4 MG/1
4 TABLET, ORALLY DISINTEGRATING ORAL EVERY 6 HOURS PRN
Status: DISCONTINUED | OUTPATIENT
Start: 2022-07-02 | End: 2022-07-05 | Stop reason: HOSPADM

## 2022-07-02 RX ORDER — PANTOPRAZOLE SODIUM 40 MG/1
40 TABLET, DELAYED RELEASE ORAL 2 TIMES DAILY
COMMUNITY
Start: 2022-06-23 | End: 2023-01-01

## 2022-07-02 RX ORDER — CLONIDINE HYDROCHLORIDE 0.1 MG/1
0.1 TABLET ORAL AT BEDTIME
Status: DISCONTINUED | OUTPATIENT
Start: 2022-07-02 | End: 2022-07-04

## 2022-07-02 RX ORDER — VANCOMYCIN HYDROCHLORIDE 125 MG/1
125 CAPSULE ORAL 4 TIMES DAILY
Status: DISCONTINUED | OUTPATIENT
Start: 2022-07-02 | End: 2022-07-05 | Stop reason: HOSPADM

## 2022-07-02 RX ORDER — ZOLPIDEM TARTRATE 5 MG/1
5 TABLET ORAL
Status: DISCONTINUED | OUTPATIENT
Start: 2022-07-02 | End: 2022-07-05 | Stop reason: HOSPADM

## 2022-07-02 RX ORDER — PANTOPRAZOLE SODIUM 40 MG/1
40 TABLET, DELAYED RELEASE ORAL 2 TIMES DAILY
Status: DISCONTINUED | OUTPATIENT
Start: 2022-07-02 | End: 2022-07-05 | Stop reason: HOSPADM

## 2022-07-02 RX ORDER — POTASSIUM CHLORIDE 1.5 G/1.58G
40 POWDER, FOR SOLUTION ORAL ONCE
Status: DISCONTINUED | OUTPATIENT
Start: 2022-07-02 | End: 2022-07-05 | Stop reason: HOSPADM

## 2022-07-02 RX ORDER — VANCOMYCIN HYDROCHLORIDE 125 MG/1
125 CAPSULE ORAL 4 TIMES DAILY
Status: ON HOLD | COMMUNITY
Start: 2022-06-23 | End: 2022-07-05

## 2022-07-02 RX ADMIN — SERTRALINE HYDROCHLORIDE 50 MG: 50 TABLET ORAL at 18:00

## 2022-07-02 RX ADMIN — PANTOPRAZOLE SODIUM 40 MG: 40 TABLET, DELAYED RELEASE ORAL at 22:05

## 2022-07-02 RX ADMIN — MAGNESIUM SULFATE 4 G: 4 INJECTION INTRAVENOUS at 12:37

## 2022-07-02 RX ADMIN — VANCOMYCIN HYDROCHLORIDE 125 MG: 125 CAPSULE ORAL at 22:05

## 2022-07-02 RX ADMIN — PANTOPRAZOLE SODIUM 40 MG: 40 INJECTION, POWDER, FOR SOLUTION INTRAVENOUS at 12:40

## 2022-07-02 RX ADMIN — METOPROLOL SUCCINATE 25 MG: 25 TABLET, EXTENDED RELEASE ORAL at 18:00

## 2022-07-02 RX ADMIN — VANCOMYCIN HYDROCHLORIDE 125 MG: 125 CAPSULE ORAL at 17:59

## 2022-07-02 RX ADMIN — FUROSEMIDE 40 MG: 10 INJECTION, SOLUTION INTRAMUSCULAR; INTRAVENOUS at 11:24

## 2022-07-02 RX ADMIN — POTASSIUM CHLORIDE 40 MEQ: 20 SOLUTION ORAL at 12:37

## 2022-07-02 RX ADMIN — POTASSIUM CHLORIDE 10 MEQ: 7.46 INJECTION, SOLUTION INTRAVENOUS at 12:36

## 2022-07-02 RX ADMIN — POTASSIUM CHLORIDE 40 MEQ: 1500 TABLET, EXTENDED RELEASE ORAL at 22:21

## 2022-07-02 RX ADMIN — CLONIDINE HYDROCHLORIDE 0.1 MG: 0.1 TABLET ORAL at 22:05

## 2022-07-02 ASSESSMENT — ACTIVITIES OF DAILY LIVING (ADL)
DOING_ERRANDS_INDEPENDENTLY_DIFFICULTY: NO
ADLS_ACUITY_SCORE: 35
ADLS_ACUITY_SCORE: 35
ADLS_ACUITY_SCORE: 18
ADLS_ACUITY_SCORE: 35
DIFFICULTY_EATING/SWALLOWING: NO
ADLS_ACUITY_SCORE: 35
CONCENTRATING,_REMEMBERING_OR_MAKING_DECISIONS_DIFFICULTY: NO
WEAR_GLASSES_OR_BLIND: NO
WALKING_OR_CLIMBING_STAIRS_DIFFICULTY: NO
DRESSING/BATHING_DIFFICULTY: NO
ADLS_ACUITY_SCORE: 35
FALL_HISTORY_WITHIN_LAST_SIX_MONTHS: NO
CHANGE_IN_FUNCTIONAL_STATUS_SINCE_ONSET_OF_CURRENT_ILLNESS/INJURY: NO
TOILETING_ISSUES: NO
DEPENDENT_IADLS:: INDEPENDENT

## 2022-07-02 ASSESSMENT — ENCOUNTER SYMPTOMS
FEVER: 1
ABDOMINAL DISTENTION: 1
VOMITING: 0
CHILLS: 1
UNEXPECTED WEIGHT CHANGE: 1
COUGH: 0
ABDOMINAL PAIN: 0

## 2022-07-02 NOTE — ED NOTES
Unable to place IV (US trained nurse attempted) message left with PICC to help with placement. To Radiology via cart, Ultrasound called, will pick patient up from radiology

## 2022-07-02 NOTE — ED TRIAGE NOTES
Presents to triage with Fiance for c/o swelling.  Hx of alcoholic cirrhosis, paracentesis.  New feet swelling.  Pt is tachypneic, sats 100%.  Weight gain of 20# in 3 days.

## 2022-07-02 NOTE — ED NOTES
"[facility] ED Handoff Report    ED Chief Complaint: swelling    ED Diagnosis:  (K70.31) Alcoholic cirrhosis of liver with ascites (H)  Comment:   Plan:     (R60.0) Bilateral leg edema  Comment:   Plan:     (E83.42) Hypomagnesemia  Comment:   Plan:     (E87.6) Hypokalemia  Comment:   Plan:        PMH:  History reviewed. No pertinent past medical history.     Code Status:  No Order     Falls Risk: Yes Band: Applied    Current Living Situation/Residence: lives alone     Elimination Status: Continent: Yes     Activity Level: SBA    Patients Preferred Language:  English     Needed: No    Vital Signs:  /77   Pulse 113   Temp 98.5  F (36.9  C) (Oral)   Resp 24   Ht 1.575 m (5' 2\")   Wt 61.7 kg (136 lb)   SpO2 100%   BMI 24.87 kg/m       Cardiac Rhythm: Sinus Tachy    Pain Score: 0/10    Is the Patient Confused:  No    Last Food or Drink: 07/02/22 at meal ordered @~1640    Focused Assessment:  Pt a/ox4. Lung sounds clear/equal bilaterally. Denies CP, SOB, lightheadedness/dizziness. C/o mild weakness d/t last meal having been last Thursday (ordered light dinner at time of assessment). Pt abdomen soft w/ mild generalized tenderness, bowel sounds audible/normoactive in all quadrants. Pt states she feels much better after her procedure. Pt up to bedside commode ad joao. BM x1 this afternoon, soft, dark brown in color. Pt voiding w/o difficulty. Pt able to make needs known, lives at home alone but stays w/ fiance & has updated him on plan of care.     Tests Performed: Done: Labs and Imaging    Treatments Provided:  Medication see MAR    Family Dynamics/Concerns: No    Family Updated On Visitor Policy: Yes    Plan of Care Communicated to Family: Yes    Who Was Updated about Plan of Care: fiance via phone    Belongings Checklist Done and Signed by Patient: Yes    Covid: asymptomatic , negative    RN: Rochelle Romo RN   7/2/2022 4:40 PM         "

## 2022-07-02 NOTE — ED NOTES
Patient states she feels better after fluid removed, up to commode to void, has not had any stools. Was recently treated for C-diff and is on medication for it. MD Feliciano will place diet order

## 2022-07-02 NOTE — H&P
[facility]    History and Physical - Hospitalist Service       Date of Admission:  7/2/2022    Assessment & Plan           Lori Jean-Baptiste is a 56 year old female admitted on 7/2/2022. She has a h/o alcoholic cirrhosis and complications.     Recent hospitalization at [facility] 3-4 days back for GI bleed and found to havepeptic ulcer and received 4 units of blood, hb is in low 7's but no h/o rectal bleeding, black stools or hematemesis.    She has been admitted with abdominal distension and sob due to abdominal distension. She had significant ascites related to her CLD and underwent paracentesis in ER and 4 liters of fluid was aspirated and send for analysis. Sob has improved post paracentesis. She also has significant anasarca and has not been  on diuretics in past. Patient got 40 mg of iv lasix 1 dose in ER and  will hold off further diuresis at this time considering 4 liters of fluids has been removed during paracentesis. Will consult GI. Patient has a h/o chronic alcohol dependence but per her information she has been sober since 12/2021           A/p :           Abdominal distension     Ascites 2/2 CLD    H/o  Cirrhosis 2/2 alcohol and hepatitis C - per information from patient    Recent hospitalization at [facility] 3-4 days back for GI bleed and found to havepeptic ulcer and received 4 units of blood, hb is in low 7's but no h/o rectal bleeding, black stools or hematemesis.    She has been admitted with abdominal distension and sob due to abdominal distension. She had significant ascites related to her CLD and underwent paracentesis in ER and 4 liters of fluid was aspirated and send for analysis. Sob has improved post paracentesis. She also has significant anasarca and has not been  on diuretics in past. Patient got 40 mg of iv lasix 1 dose in ER and  will hold off further diuresis at this time considering 4 liters of fluids has been removed during paracentesis. Will consult GI. Patient  has a h/o chronic alcohol dependence but per her information she has been sober since 12/2021        Tachycardia, Sinus : no convincing evidence of sepsis, monitor      HTN, Essential : on metoprolol 25 mg daily, clonidine 0.1 mg at bedtime - at home.      Anxiety/Depression : on zoloft 50 mg daily, ambien 5 mg at bedtime prn -at home.      Recent C diff at [facility] : on oral vancomycin 125 mg qid for 12 days      Chronic alcohol dependence : sober since 12/2021      Hypokalemia : supplement prn      Metabolic acidosis : montior.      Hypomagnesemia : supplement prn      Anemia : Hb at 7.6 : runs around 7's -8's, no h/o GI bleed today, monitor, transfuse prn to keep hb > 7           Diet: 2 Gram Sodium Diet    DVT Prophylaxis: Pneumatic Compression Devices  Nelson Catheter: Not present  Central Lines: None  Cardiac Monitoring: None  Code Status:   full     Clinically Significant Risk Factors Present on Admission        # Hypokalemia: K = 2.3 mmol/L (Ref range: 3.5 - 5.0 mmol/L) on admission, will replace as needed    # Hypomagnesemia: Mg = 1.2 mg/dL (Ref range: 1.8 - 2.6 mg/dL) on admission, will replace as needed   # Hypoalbuminemia: Albumin = 2.5 g/dL (Ref range: 3.5 - 5.0 g/dL) on admission, will monitor as appropriate   # Coagulation Defect: INR = 1.29 (Ref range: 0.85 - 1.15) and/or PTT = N/A on admission, will monitor for bleeding   # Hypertension: home medication list includes antihypertensive(s)          Disposition Plan         The patient's care was discussed with the Bedside Nurse and Patient.    Dell Mendoza MD  Hospitalist Service  [facility]  Securely message with the [employer] Web Console (learn more here)  Text page via [employer] Paging/Directory         ______________________________________________________________________    Chief Complaint   Abdominal distension    History is obtained from the patient    History of Present Illness       Lori Jean-Baptiste is a 56 year  old female admitted on 7/2/2022. She has a h/o alcoholic cirrhosis and complications.     Recent hospitalization at [facility] 3-4 days back for GI bleed and found to havepeptic ulcer and received 4 units of blood, hb is in low 7's but no h/o rectal bleeding, black stools or hematemesis.    She has been admitted with abdominal distension and sob due to abdominal distension. She had significant ascites related to her CLD and underwent paracentesis in ER and 4 liters of fluid was aspirated and send for analysis. Sob has improved post paracentesis. She also has significant anasarca and has not been  on diuretics in past. Patient got 40 mg of iv lasix 1 dose in ER and  will hold off further diuresis at this time considering 4 liters of fluids has been removed during paracentesis. Will consult GI.        Review of Systems      No fever or chills  No cp or sob or cough or phlegm  No nausea, vomiting, diarrhea  No urinary symptoms  No neuro symptoms    Past Medical History    I have reviewed this patient's medical history and updated it with pertinent information if needed.   History reviewed. No pertinent past medical history.    Past Surgical History   I have reviewed this patient's surgical history and updated it with pertinent information if needed.  Past Surgical History:   Procedure Laterality Date     ESOPHAGOSCOPY, GASTROSCOPY, DUODENOSCOPY (EGD), COMBINED N/A 4/11/2017    Procedure: ESOPHAGOGASTRODUODENOSCOPY (EGD) with biopsy;  Surgeon: Jesus Woods MD;  Location: [facility];  Service:      ESOPHAGOSCOPY, GASTROSCOPY, DUODENOSCOPY (EGD), COMBINED N/A 2/8/2018    Procedure: ESOPHAGOGASTRODUODENOSCOPY (EGD);  Surgeon: Sherif Valentine MD;  Location: [facility];  Service:      LAPAROSCOPIC CHOLECYSTECTOMY N/A 3/16/2017    Procedure: LAPAROSCOPIC CHOLECYSTECTOMY, ;  Surgeon: Jesus Ramirez DO;  Location: [facility] OR;  Service:      OTHER SURGICAL HISTORY      left forearm fracturewith hardware        Social History   I have reviewed this patient's social history and updated it with pertinent information if needed.  Social History     Tobacco Use     Smoking status: Current Some Day Smoker     Types: Cigarettes, Cigarettes     Smokeless tobacco: Never Used     Tobacco comment: approx. 1 a week   Substance Use Topics     Alcohol use: No     Comment: Alcoholic Drinks/day: sober since 11/2016     Drug use: No     Lives in [facility]  1 children  Smokes occasionally, no drugs  Working : no    Family History   I have reviewed this patient's family history and updated it with pertinent information if needed.  Family History   Problem Relation Age of Onset     Lung Cancer Father        Prior to Admission Medications   Prior to Admission Medications   Prescriptions Last Dose Informant Patient Reported? Taking?   cloNIDine (CATAPRES) 0.1 MG tablet Not Taking at Unknown time  Yes No   Sig: Take 0.1 mg by mouth At Bedtime   Patient not taking: Reported on 7/2/2022   metoprolol succinate ER (TOPROL XL) 25 MG 24 hr tablet 7/1/2022 at Unknown time  Yes Yes   Sig: Take 1 tablet by mouth daily   pantoprazole (PROTONIX) 40 MG EC tablet 7/1/2022 at Unknown time  Yes Yes   Sig: Take 40 mg by mouth 2 times daily   sertraline (ZOLOFT) 50 MG tablet Not Taking at Unknown time  Yes No   Sig: Take 50 mg by mouth daily   Patient not taking: Reported on 7/2/2022   vancomycin (VANCOCIN) 125 MG capsule 7/1/2022 at Unknown time  Yes Yes   Sig: Take 125 mg by mouth 4 times daily For 12 days   zolpidem (AMBIEN) 5 MG tablet Not Taking at Unknown time  Yes No   Sig: Take 5 mg by mouth nightly as needed   Patient not taking: Reported on 7/2/2022      Facility-Administered Medications: None     Allergies   No Known Allergies    Physical Exam   Vital Signs: Temp: 98.5  F (36.9  C) Temp src: Oral BP: 139/77 Pulse: 113   Resp: 24 SpO2: 100 % O2 Device: None (Room air)    Weight: 136 lbs 0 oz       GENERAL: The patient is not in any  acute distressed. Awake and alert.  HEENT: Nonicteric sclerae, PERRLA, EOMI. Oropharynx clear. Moist mucous membranes. Conjunctivae appear well perfused.  HEART: Regular rate and rhythm without murmurs.  LUNGS: Clear to auscultation bilaterally. No wheezing or crackles.  ABDOMEN: Soft, positive bowel sounds, nontender.  SKIN: No rash, no excessive bruising, petechiae, or purpura.  EXTREMITIES : no rashes, no swelling in legs.  NEUROLOGIC: conscious and oriented, follows commands, no obvious focal deficits.  ROS: All other systems negative       Data   Data reviewed today: I reviewed all medications, new labs and imaging results over the last 24 hours. I personally reviewed no images or EKG's today.    Recent Labs   Lab 07/02/22  1637 07/02/22  1118 06/29/22  1118   WBC  --  4.1  --    HGB  --  7.6*  --    MCV  --  88  --    PLT  --  143*  --    INR  --  1.29*  --    NA  --  136 136   POTASSIUM  --  2.3* 3.1*   CHLORIDE  --  101 102   CO2  --  21* 23   BUN  --  5* 6.1   CR  --  0.62 0.73   ANIONGAP  --  14 11   CARLOS  --  7.9* 7.7*   * 95 107*   ALBUMIN  --  2.5* 2.9*   PROTTOTAL  --  5.7* 5.3*   BILITOTAL  --  1.9* 1.3*   ALKPHOS  --  113 118*   ALT  --  32 39*   AST  --  72* 63*

## 2022-07-02 NOTE — ED PROVIDER NOTES
EMERGENCY DEPARTMENT ENCOUNTER      NAME: Lori Jean-Baptiste  AGE: 56 year old female  YOB: 1965  MRN: [id number]  EVALUATION DATE & TIME: No admission date for patient encounter.    PCP: No primary care provider on file.    ED PROVIDER: Eduin Rosales M.D.    Chief Complaint   Patient presents with     Swelling       FINAL IMPRESSION:  1. Alcoholic cirrhosis of liver with ascites (H)    2. Bilateral leg edema    3. Hypomagnesemia    4. Hypokalemia        ED COURSE & MEDICAL DECISION MAKING:    Pertinent Labs & Imaging studies personally reviewed and interpreted by me. (See chart for details)    9:19 AM Patient seen and examined, prior records reviewed.  I met with the patient to gather history and to perform my initial exam. We discussed plans for the ED course, including diagnostic testing and treatment. PPE: surgical mask and gloves.  Differential diagnosis includes not limited to liver failure, renal failure, anemia, heart failure, DVT.  Patient presents with abdominal distention, lower extremity swelling, and shortness of breath.  History of cirrhosis but has not required paracentesis or diuretics for some time.  On exam here, tachycardic, tachypneic but lungs are clear and oxygen saturation 100%.  Abdomen quite distended without tenderness, bedside ultrasound demonstrates large volume ascites.  Also has bilateral lower extremity pitting edema to the knees.  Symptoms are likely related to fluid overload in setting of end-stage liver disease and recent hospitalization for GI bleed with blood transfusion.  Ultrasound paracentesis is ordered, cultures will be done although patient has no abdominal pain or tenderness and SBP is unlikely.  Labs are ordered and plan admission for diuresis and further evaluation.  11:13 AM chest x-ray is negative, delay in labs due to need for PICC to place IV.  Patient did go for ultrasound paracentesis with 3.5 L output.  11:19 AM I rechecked and updated the  patient. She feels better and is refusing to be transferred to [facility].  She would like to be discharged home with continued diuresis at home.  Given ongoing tachycardia, I am a little concerned about this given continued tachycardia and lower extremity swelling.  We agreed to review labs and make disposition decision after that.  11:48 AM CBC demonstrates hemoglobin of 7.6 which is approximately stable from her discharge from [facility] last week.  12:06 PM patient noted to have hypomagnesemia and hypokalemia.  Will replace intravenously and orally but given levels, patient will need to be admitted for further evaluation and treatment, especially in light of need for diuresis.  12:08 PM I rechecked and updated the patient.  Discussed need for admission and patient is agreeable.  12:32 PM I discussed the case with hospitalist, Dr Mendoza, who accepts the patient.  At the conclusion of the encounter I discussed the results of all of the tests and the disposition. The questions were answered. The patient or family acknowledged understanding and was agreeable with the care plan.     PROCEDURES:   POC US ABDOMEN LIMITED    Date/Time: 7/2/2022 9:38 AM  Performed by: Eduin Rosales MD  Authorized by: Eduin Rosales MD     Procedure details:     Indications comment:  Abd distension    Assessment for:  Intra-abdominal fluid  Comments:      Marked ascites noted bilaterally.  Images in note.    LUQ    RUQ      Critical Care     Performed by: Dr Eduin Rosales  Authorized by: Dr Eduin Rosales  Total critical care time: 140 minutes  Critical care was necessary to treat or prevent imminent or life-threatening deterioration of the following conditions: Hypokalemia, hypomagnesemia  Critical care was time spent personally by me on the following activities: development of treatment plan with patient or surrogate, discussions with consultants, examination of patient, evaluation of patient's response to treatment, obtaining  history from patient or surrogate, ordering and performing treatments and interventions, ordering and review of laboratory studies, ordering and review of radiographic studies, re-evaluation of patient's condition and monitoring for potential decompensation.  Critical care time was exclusive of separately billable procedures and treating other patients.      MEDICATIONS GIVEN IN THE EMERGENCY:  Medications   potassium chloride 10 mEq in 100 mL sterile water intermittent infusion (premix) (has no administration in time range)   potassium chloride (KAYCIEL) solution 40 mEq (has no administration in time range)   magnesium sulfate 4 g in 50 mL sterile water (premade) (has no administration in time range)   pantoprazole (PROTONIX) IV push injection 40 mg (has no administration in time range)   furosemide (LASIX) injection 40 mg (40 mg Intravenous Given 7/2/22 1124)       NEW PRESCRIPTIONS STARTED AT TODAY'S ER VISIT  New Prescriptions    No medications on file       =================================================================    HPI    Patient information was obtained from: patient       Lori Jean-Baptiste is a 56 year old female with a pertinent history of hypertension, generalized abdominal pain, alcoholic cirrhosis of liver with ascites, upper GI bleed, hypokalemia, hypomagnesemia, hepatitis C, and s/p cholecystectomy who presents to this ED by walk in for evaluation of abdominal distention and leg swelling.    Patient reports abdominal distention and bilateral leg swelling that began 2-3 days ago. She was recently hospitalized at [facility] for upper GI bleed related to gastritis, with small non-bleeding varices and did require blood transfusion (4 units), believes she may be holding onto that fluid. She has gained about 20 pounds in the past few days. Endorses a fever and chills. Denies any cough, vomiting, or urinary issues. No other complaints or concerns expressed at this time.  Denies abdominal pain.  Denies  chest pain.  She has been on Lasix and spironolactone in the past but not recently, did restart these in the last couple of days due to symptoms. She has required paracentesis in the past but not for the last couple of years. She follows with [facility]. Patient states does not drink alcohol, although she notes she slipped up once in April and had 2 drinks. She takes Metroprolol and Clonidine for her blood pressure.    REVIEW OF SYSTEMS   Review of Systems   Constitutional: Positive for chills, fever and unexpected weight change (20 pound gain).   Respiratory: Negative for cough.    Cardiovascular: Negative for chest pain.   Gastrointestinal: Positive for abdominal distention. Negative for abdominal pain and vomiting.   Musculoskeletal:        Bilateral extremity edema   All other systems reviewed and are negative.     All other systems reviewed and negative    PAST MEDICAL HISTORY:  History reviewed. No pertinent past medical history.    PAST SURGICAL HISTORY:  Past Surgical History:   Procedure Laterality Date     ESOPHAGOSCOPY, GASTROSCOPY, DUODENOSCOPY (EGD), COMBINED N/A 4/11/2017    Procedure: ESOPHAGOGASTRODUODENOSCOPY (EGD) with biopsy;  Surgeon: Jesus Woods MD;  Location: [facility];  Service:      ESOPHAGOSCOPY, GASTROSCOPY, DUODENOSCOPY (EGD), COMBINED N/A 2/8/2018    Procedure: ESOPHAGOGASTRODUODENOSCOPY (EGD);  Surgeon: Sherif Valentine MD;  Location: [facility];  Service:      LAPAROSCOPIC CHOLECYSTECTOMY N/A 3/16/2017    Procedure: LAPAROSCOPIC CHOLECYSTECTOMY, ;  Surgeon: Jesus Ramirez DO;  Location: [facility] OR;  Service:      OTHER SURGICAL HISTORY      left forearm fracturewith hardware       CURRENT MEDICATIONS:    Current Facility-Administered Medications   Medication     magnesium sulfate 4 g in 50 mL sterile water (premade)     pantoprazole (PROTONIX) IV push injection 40 mg     potassium chloride (KAYCIEL) solution 40 mEq     potassium chloride 10 mEq in 100 mL sterile  "water intermittent infusion (premix)     Current Outpatient Medications   Medication     metoprolol succinate ER (TOPROL XL) 25 MG 24 hr tablet     pantoprazole (PROTONIX) 40 MG EC tablet     vancomycin (VANCOCIN) 125 MG capsule     cloNIDine (CATAPRES) 0.1 MG tablet     sertraline (ZOLOFT) 50 MG tablet     zolpidem (AMBIEN) 5 MG tablet       ALLERGIES:  No Known Allergies    FAMILY HISTORY:  Family History   Problem Relation Age of Onset     Lung Cancer Father        SOCIAL HISTORY:   Social History     Socioeconomic History     Marital status:    Tobacco Use     Smoking status: Current Some Day Smoker     Types: Cigarettes, Cigarettes     Smokeless tobacco: Never Used     Tobacco comment: approx. 1 a week   Substance and Sexual Activity     Alcohol use: No     Comment: Alcoholic Drinks/day: sober since 11/2016     Drug use: No       VITALS:  BP (!) 147/80   Pulse 116   Temp 98.5  F (36.9  C) (Oral)   Resp (!) 33   Ht 1.575 m (5' 2\")   Wt 61.7 kg (136 lb)   SpO2 100%   BMI 24.87 kg/m      PHYSICAL EXAM:  Physical Exam  Vitals and nursing note reviewed.   Constitutional:       Appearance: Normal appearance.   HENT:      Head: Normocephalic and atraumatic.      Right Ear: External ear normal.      Left Ear: External ear normal.      Nose: Nose normal.      Mouth/Throat:      Mouth: Mucous membranes are moist.   Eyes:      Extraocular Movements: Extraocular movements intact.      Conjunctiva/sclera: Conjunctivae normal.      Pupils: Pupils are equal, round, and reactive to light.   Cardiovascular:      Rate and Rhythm: Normal rate and regular rhythm.   Pulmonary:      Effort: Pulmonary effort is normal.      Breath sounds: Normal breath sounds. No wheezing or rales.   Abdominal:      General: Abdomen is flat. There is distension (With fluid wave).      Palpations: Abdomen is soft.      Tenderness: There is no abdominal tenderness. There is no guarding.   Musculoskeletal:         General: Normal range of " motion.      Cervical back: Normal range of motion and neck supple.      Right lower leg: Edema present.      Left lower leg: Edema present.   Lymphadenopathy:      Cervical: No cervical adenopathy.   Skin:     General: Skin is warm and dry.      Coloration: Skin is pale.   Neurological:      General: No focal deficit present.      Mental Status: She is alert and oriented to person, place, and time. Mental status is at baseline.      Comments: No gross focal neurologic deficits   Psychiatric:         Mood and Affect: Mood normal.         Behavior: Behavior normal.         Thought Content: Thought content normal.          LAB:  All pertinent labs reviewed and interpreted.  Results for orders placed or performed during the hospital encounter of 07/02/22   US Paracentesis without Albumin    Impression    IMPRESSION:  1.  Status post ultrasound-guided paracentesis.    Reference CPT Code: 75311   Chest XR,  PA & LAT    Impression    IMPRESSION: There is plate like atelectasis at the right lung base new from prior study. No pleural effusions. Heart size is normal. Bony demineralization. Surgical clips right upper quadrant.   Result Value Ref Range    INR 1.29 (H) 0.85 - 1.15   Basic metabolic panel   Result Value Ref Range    Sodium 136 136 - 145 mmol/L    Potassium 2.3 (LL) 3.5 - 5.0 mmol/L    Chloride 101 98 - 107 mmol/L    Carbon Dioxide (CO2) 21 (L) 22 - 31 mmol/L    Anion Gap 14 5 - 18 mmol/L    Urea Nitrogen 5 (L) 8 - 22 mg/dL    Creatinine 0.62 0.60 - 1.10 mg/dL    Calcium 7.9 (L) 8.5 - 10.5 mg/dL    Glucose 95 70 - 125 mg/dL    GFR Estimate >90 >60 mL/min/1.73m2   Result Value Ref Range    Magnesium 1.2 (L) 1.8 - 2.6 mg/dL   B-Type Natriuretic Peptide ([facility] Only)   Result Value Ref Range    BNP 62 0 - 80 pg/mL   Hepatic function panel   Result Value Ref Range    Bilirubin Total 1.9 (H) 0.0 - 1.0 mg/dL    Bilirubin Direct 0.9 (H) <=0.5 mg/dL    Protein Total 5.7 (L) 6.0 - 8.0 g/dL    Albumin 2.5 (L) 3.5 - 5.0  g/dL    Alkaline Phosphatase 113 45 - 120 U/L    AST 72 (H) 0 - 40 U/L    ALT 32 0 - 45 U/L   CBC with platelets and differential   Result Value Ref Range    WBC Count 4.1 4.0 - 11.0 10e3/uL    RBC Count 2.69 (L) 3.80 - 5.20 10e6/uL    Hemoglobin 7.6 (L) 11.7 - 15.7 g/dL    Hematocrit 23.7 (L) 35.0 - 47.0 %    MCV 88 78 - 100 fL    MCH 28.3 26.5 - 33.0 pg    MCHC 32.1 31.5 - 36.5 g/dL    RDW 17.5 (H) 10.0 - 15.0 %    Platelet Count 143 (L) 150 - 450 10e3/uL    % Neutrophils 69 %    % Lymphocytes 19 %    % Monocytes 12 %    % Eosinophils 0 %    % Basophils 0 %    % Immature Granulocytes 0 %    NRBCs per 100 WBC 0 <1 /100    Absolute Neutrophils 2.8 1.6 - 8.3 10e3/uL    Absolute Lymphocytes 0.8 0.8 - 5.3 10e3/uL    Absolute Monocytes 0.5 0.0 - 1.3 10e3/uL    Absolute Eosinophils 0.0 0.0 - 0.7 10e3/uL    Absolute Basophils 0.0 0.0 - 0.2 10e3/uL    Absolute Immature Granulocytes 0.0 <=0.4 10e3/uL    Absolute NRBCs 0.0 10e3/uL   UA with Microscopic reflex to Culture    Specimen: Urine, Clean Catch   Result Value Ref Range    Color Urine Light Yellow Colorless, Straw, Light Yellow, Yellow    Appearance Urine Clear Clear    Glucose Urine Negative Negative mg/dL    Bilirubin Urine Negative Negative    Ketones Urine Negative Negative mg/dL    Specific Gravity Urine 1.007 1.001 - 1.030    Blood Urine Negative Negative    pH Urine 7.0 5.0 - 7.0    Protein Albumin Urine Negative Negative mg/dL    Urobilinogen Urine <2.0 <2.0 mg/dL    Nitrite Urine Negative Negative    Leukocyte Esterase Urine Negative Negative    Mucus Urine Present (A) None Seen /LPF    RBC Urine 0 <=2 /HPF    WBC Urine <1 <=5 /HPF    Squamous Epithelials Urine <1 <=1 /HPF   Asymptomatic COVID-19 Virus (Coronavirus) by PCR Nasopharyngeal    Specimen: Nasopharyngeal; Swab   Result Value Ref Range    SARS CoV2 PCR Negative Negative   Adult Type and Screen   Result Value Ref Range    ABO/RH(D) O POS     Antibody Screen Negative Negative    SPECIMEN EXPIRATION  DATE [id number]        RADIOLOGY:  Reviewed all pertinent imaging. Please see official radiology report.  US Paracentesis without Albumin   Final Result   IMPRESSION:   1.  Status post ultrasound-guided paracentesis.      Reference CPT Code: 50204      Chest XR,  PA & LAT   Final Result   IMPRESSION: There is plate like atelectasis at the right lung base new from prior study. No pleural effusions. Heart size is normal. Bony demineralization. Surgical clips right upper quadrant.      POC US ABDOMEN LIMITED    (Results Pending)       EKG:    Performed at: 9:35 AM  Impression: Tachycardia, nonspecific T wave changes, no acute ischemic changes  Rate: 113  Rhythm: Sinus  Axis: Normal  CT Interval: 156  QRS Interval: 66  QTc Interval: 515  ST Changes: No acute ischemic changes  Comparison: February 2018, T wave inversion laterally    I have independently reviewed and interpreted the EKG(s) documented above.    I, Ana María Damian, am serving as a scribe to document services personally performed by Dr. Rosales based on my observation and the provider's statements to me. I, Eduin Rosales MD attest that Ana María Damian is acting in a scribe capacity, has observed my performance of the services and has documented them in accordance with my direction.    Eduin Rosales M.D.  Emergency Medicine  [facility] EMERGENCY DEPARTMENT  [facility] [facility] [address] [phone]  Dept: [phone]     Eduin Rosales MD  07/03/22 0528

## 2022-07-02 NOTE — CONSULTS
Care Management Initial Consult    General Information  Assessment completed with: Patient, pt  Type of CM/SW Visit: Initial Assessment    Primary Care Provider verified and updated as needed: Yes   Readmission within the last 30 days: no previous admission in last 30 days   Return Category: Progression of disease  Reason for Consult: discharge planning  Advance Care Planning: Advance Care Planning Reviewed: verified with patient, no concerns identified     General Information Comments: lives alone and has fiance helping her, will need walker and has a CADI Waiver and no other svcs at home, independent otherwise    Communication Assessment  Patient's communication style: spoken language (English or Bilingual)             Cognitive  Cognitive/Neuro/Behavioral:                        Living Environment:   People in home: alone     Current living Arrangements: apartment      Able to return to prior arrangements: yes       Family/Social Support:  Care provided by: self  Provides care for: no one  Marital Status: Single  Significant Other          Description of Support System: Supportive, Involved    Support Assessment: Adequate family and caregiver support, Adequate social supports    Current Resources:   Patient receiving home care services: No     Community Resources: County Worker, [employer]  Equipment currently used at home: none  Supplies currently used at home: None    Employment/Financial:  Employment Status:          Financial Concerns: No concerns identified   Referral to Financial Worker: No       Lifestyle & Psychosocial Needs:  Social Determinants of Health     Tobacco Use: High Risk     Smoking Tobacco Use: Current Some Day Smoker     Smokeless Tobacco Use: Never Used   Alcohol Use: Not on file   Financial Resource Strain: Not on file   Food Insecurity: Not on file   Transportation Needs: Not on file   Physical Activity: Not on file   Stress: Not on file   Social Connections: Not on file   Intimate  Partner Violence: Not on file   Depression: Not on file   Housing Stability: Not on file       Functional Status:  Prior to admission patient needed assistance:   Dependent ADLs:: Independent  Dependent IADLs:: Independent  Assesssment of Functional Status: Not at baseline with ADL Functioning, Not at baseline with mobility, Not at  functional baseline    Mental Health Status:  Mental Health Status: No Current Concerns       Chemical Dependency Status:                Values/Beliefs:  Spiritual, Cultural Beliefs, Mosque Practices, Values that affect care:                 Additional Information:  Assessed, lives alone and has fiance helping her, will need walker and has a CADI Waiver and no other svcs at home, independent otherwise. Fiance can transport.      Salinas Torrez RN

## 2022-07-02 NOTE — H&P
Assessment      56 yr old female admitted with alcoholic cirrhosis and ascites.         Abdominal distension    Ascites s/p paracentesis - 4 liters out    Cirrhosis, hep c, alcohol    Tachycardia     Recent hospitalization at [facility] 3-4 days back and found peptic ulcer and s/p 4 units of blood    HTN, Essential : on metoprolol 25 mg daily, clonidine 0.1 mg at bedtime    Anxiety/Depression : on zoloft 50 mg daily, ambien 5 mg at bedtime prn    Recent C diff : on oral vancomycin 125 mg qid for 12 days    Chronic alcohol dependence : sober since 12/2021    Low K    Metabolic acidosis    Low mg    Hb at 7.6 : runs around 7's -8's              Physical Exam:          HEENT : no distended veins, no lymphadenopathy, thyroid is normal  LUNGS : b/l air entry present, no significant crackles/wheezing.  ABDOMEN : soft, non-tender, non-distended, BS present.  HEART :  Regular rate & rhythm, S1 & S2 normal, no murmur, clicks/rubs, no ankle edema,  NEURO : conscious, oriented, responds to commands, no obvious focal deficit.  MUSCULOSKELETAL : EXTREMITIES :  no calf tenderness.  SKIN : no rashes  BACK : wnl          DETAILED H & P is Pending at this time

## 2022-07-02 NOTE — PHARMACY-ADMISSION MEDICATION HISTORY
Pharmacy Note - Admission Medication History    Pertinent Provider Information: Pt was discharged from [facility] on 6/23 where they discontinued her:  -Furosemide 40mg every day  -Spironolactone 100mg every day  She was supposed to start taking clonidine, sertraline, and ambien also at discharge but pt reports she has not been taking these.   While at [facility], she also had a positive C.diff test and was started on a 12 day course of oral vancomycin, due to finish on 7/5/22.      ______________________________________________________________________    Prior To Admission (PTA) med list completed and updated in EMR.       PTA Med List   Medication Sig Last Dose     metoprolol succinate ER (TOPROL XL) 25 MG 24 hr tablet Take 1 tablet by mouth daily 7/1/2022 at Unknown time     pantoprazole (PROTONIX) 40 MG EC tablet Take 40 mg by mouth 2 times daily 7/1/2022 at Unknown time     vancomycin (VANCOCIN) 125 MG capsule Take 125 mg by mouth 4 times daily For 12 days 7/1/2022 at Unknown time       Information source(s): Patient and Hospital records  Method of interview communication: in-person    Summary of Changes to PTA Med List  New: vancomycin, clonidine, pantoprazole, sertraline, zolpidem, metoprolol  Discontinued: furosemide, spironolactone, hydroxyzine, magnesium, omeprazole  Changed: N/A    Patient was asked about OTC/herbal products specifically.  PTA med list reflects this.    In the past week, patient estimated taking medication this percent of the time:  50-90% due to other.    Allergies were reviewed, assessed, and updated with the patient.      Patient does not use any multi-dose medications prior to admission.    The information provided in this note is only as accurate as the sources available at the time of the update(s).    Thank you for the opportunity to participate in the care of this patient.    Donita Causey  7/2/2022 11:16 AM

## 2022-07-02 NOTE — ED NOTES
Patient returned from Ultrasound, states she feels so much better after fluids removed (3.5 liters). IV was placed by PICC nurse while at ultrasound. Lasix given pure wick in place

## 2022-07-03 ENCOUNTER — APPOINTMENT (OUTPATIENT)
Dept: ULTRASOUND IMAGING | Facility: HOSPITAL | Age: 57
DRG: 433 | End: 2022-07-03
Attending: INTERNAL MEDICINE
Payer: MEDICARE

## 2022-07-03 LAB
ALBUMIN SERPL-MCNC: 2.1 G/DL (ref 3.5–5)
ALP SERPL-CCNC: 99 U/L (ref 45–120)
ALT SERPL W P-5'-P-CCNC: 25 U/L (ref 0–45)
ANION GAP SERPL CALCULATED.3IONS-SCNC: 6 MMOL/L (ref 5–18)
AST SERPL W P-5'-P-CCNC: 61 U/L (ref 0–40)
BASOPHILS # BLD AUTO: 0 10E3/UL (ref 0–0.2)
BASOPHILS # BLD AUTO: 0 10E3/UL (ref 0–0.2)
BASOPHILS NFR BLD AUTO: 0 %
BASOPHILS NFR BLD AUTO: 0 %
BILIRUB DIRECT SERPL-MCNC: 0.7 MG/DL
BILIRUB SERPL-MCNC: 1.2 MG/DL (ref 0–1)
BLD PROD TYP BPU: NORMAL
BLOOD COMPONENT TYPE: NORMAL
BUN SERPL-MCNC: 6 MG/DL (ref 8–22)
CALCIUM SERPL-MCNC: 7.7 MG/DL (ref 8.5–10.5)
CHLORIDE BLD-SCNC: 103 MMOL/L (ref 98–107)
CO2 SERPL-SCNC: 25 MMOL/L (ref 22–31)
CODING SYSTEM: NORMAL
CREAT SERPL-MCNC: 0.56 MG/DL (ref 0.6–1.1)
CROSSMATCH: NORMAL
EOSINOPHIL # BLD AUTO: 0 10E3/UL (ref 0–0.7)
EOSINOPHIL # BLD AUTO: 0.1 10E3/UL (ref 0–0.7)
EOSINOPHIL NFR BLD AUTO: 1 %
EOSINOPHIL NFR BLD AUTO: 1 %
ERYTHROCYTE [DISTWIDTH] IN BLOOD BY AUTOMATED COUNT: 17.1 % (ref 10–15)
ERYTHROCYTE [DISTWIDTH] IN BLOOD BY AUTOMATED COUNT: 17.7 % (ref 10–15)
GFR SERPL CREATININE-BSD FRML MDRD: >90 ML/MIN/1.73M2
GLUCOSE BLD-MCNC: 103 MG/DL (ref 70–125)
HCT VFR BLD AUTO: 20.6 % (ref 35–47)
HCT VFR BLD AUTO: 28 % (ref 35–47)
HGB BLD-MCNC: 6.4 G/DL (ref 11.7–15.7)
HGB BLD-MCNC: 8.8 G/DL (ref 11.7–15.7)
HGB BLD-MCNC: 9.1 G/DL (ref 11.7–15.7)
IMM GRANULOCYTES # BLD: 0 10E3/UL
IMM GRANULOCYTES # BLD: 0 10E3/UL
IMM GRANULOCYTES NFR BLD: 0 %
IMM GRANULOCYTES NFR BLD: 0 %
IRON BINDING CAPACITY (ROCHE): 265 UG/DL (ref 240–430)
IRON SATN MFR SERPL: 12 % (ref 15–46)
IRON SERPL-MCNC: 31 UG/DL (ref 37–145)
ISSUE DATE AND TIME: NORMAL
LACTATE SERPL-SCNC: 4.6 MMOL/L (ref 0.7–2)
LDH SERPL L TO P-CCNC: 199 U/L (ref 0–250)
LYMPHOCYTES # BLD AUTO: 1.2 10E3/UL (ref 0.8–5.3)
LYMPHOCYTES # BLD AUTO: 1.2 10E3/UL (ref 0.8–5.3)
LYMPHOCYTES NFR BLD AUTO: 28 %
LYMPHOCYTES NFR BLD AUTO: 32 %
MAGNESIUM SERPL-MCNC: 1.9 MG/DL (ref 1.8–2.6)
MCH RBC QN AUTO: 27.9 PG (ref 26.5–33)
MCH RBC QN AUTO: 28.9 PG (ref 26.5–33)
MCHC RBC AUTO-ENTMCNC: 31.1 G/DL (ref 31.5–36.5)
MCHC RBC AUTO-ENTMCNC: 31.4 G/DL (ref 31.5–36.5)
MCV RBC AUTO: 90 FL (ref 78–100)
MCV RBC AUTO: 92 FL (ref 78–100)
MONOCYTES # BLD AUTO: 0.4 10E3/UL (ref 0–1.3)
MONOCYTES # BLD AUTO: 0.5 10E3/UL (ref 0–1.3)
MONOCYTES NFR BLD AUTO: 13 %
MONOCYTES NFR BLD AUTO: 9 %
NEUTROPHILS # BLD AUTO: 2 10E3/UL (ref 1.6–8.3)
NEUTROPHILS # BLD AUTO: 2.8 10E3/UL (ref 1.6–8.3)
NEUTROPHILS NFR BLD AUTO: 54 %
NEUTROPHILS NFR BLD AUTO: 62 %
NRBC # BLD AUTO: 0 10E3/UL
NRBC # BLD AUTO: 0 10E3/UL
NRBC BLD AUTO-RTO: 0 /100
NRBC BLD AUTO-RTO: 0 /100
PLATELET # BLD AUTO: 106 10E3/UL (ref 150–450)
PLATELET # BLD AUTO: 109 10E3/UL (ref 150–450)
POTASSIUM BLD-SCNC: 3.1 MMOL/L (ref 3.5–5)
POTASSIUM BLD-SCNC: 3.2 MMOL/L (ref 3.5–5)
POTASSIUM BLD-SCNC: 4.1 MMOL/L (ref 3.5–5)
PROT SERPL-MCNC: 4.8 G/DL (ref 6–8)
RBC # BLD AUTO: 2.29 10E6/UL (ref 3.8–5.2)
RBC # BLD AUTO: 3.04 10E6/UL (ref 3.8–5.2)
RETICS # AUTO: 0.09 10E6/UL (ref 0.01–0.11)
RETICS # AUTO: 0.09 10E6/UL (ref 0.01–0.11)
RETICS/RBC NFR AUTO: 2.8 % (ref 0.8–2.7)
RETICS/RBC NFR AUTO: 2.9 % (ref 0.8–2.7)
SODIUM SERPL-SCNC: 134 MMOL/L (ref 136–145)
UNIT ABO/RH: NORMAL
UNIT NUMBER: NORMAL
UNIT STATUS: NORMAL
UNIT TYPE ISBT: 5100
VIT B12 SERPL-MCNC: 1301 PG/ML (ref 232–1245)
WBC # BLD AUTO: 3.7 10E3/UL (ref 4–11)
WBC # BLD AUTO: 4.5 10E3/UL (ref 4–11)

## 2022-07-03 PROCEDURE — 85018 HEMOGLOBIN: CPT | Performed by: INTERNAL MEDICINE

## 2022-07-03 PROCEDURE — P9047 ALBUMIN (HUMAN), 25%, 50ML: HCPCS | Performed by: INTERNAL MEDICINE

## 2022-07-03 PROCEDURE — 83550 IRON BINDING TEST: CPT | Performed by: INTERNAL MEDICINE

## 2022-07-03 PROCEDURE — 258N000003 HC RX IP 258 OP 636

## 2022-07-03 PROCEDURE — 85045 AUTOMATED RETICULOCYTE COUNT: CPT | Performed by: INTERNAL MEDICINE

## 2022-07-03 PROCEDURE — 120N000001 HC R&B MED SURG/OB

## 2022-07-03 PROCEDURE — 84132 ASSAY OF SERUM POTASSIUM: CPT | Performed by: INTERNAL MEDICINE

## 2022-07-03 PROCEDURE — 83615 LACTATE (LD) (LDH) ENZYME: CPT | Performed by: INTERNAL MEDICINE

## 2022-07-03 PROCEDURE — 250N000011 HC RX IP 250 OP 636: Performed by: INTERNAL MEDICINE

## 2022-07-03 PROCEDURE — 80321 ALCOHOLS BIOMARKERS 1OR 2: CPT | Performed by: INTERNAL MEDICINE

## 2022-07-03 PROCEDURE — 87040 BLOOD CULTURE FOR BACTERIA: CPT | Performed by: INTERNAL MEDICINE

## 2022-07-03 PROCEDURE — 82607 VITAMIN B-12: CPT | Performed by: INTERNAL MEDICINE

## 2022-07-03 PROCEDURE — 250N000013 HC RX MED GY IP 250 OP 250 PS 637: Performed by: INTERNAL MEDICINE

## 2022-07-03 PROCEDURE — 83010 ASSAY OF HAPTOGLOBIN QUANT: CPT | Performed by: INTERNAL MEDICINE

## 2022-07-03 PROCEDURE — P9016 RBC LEUKOCYTES REDUCED: HCPCS

## 2022-07-03 PROCEDURE — 36415 COLL VENOUS BLD VENIPUNCTURE: CPT | Performed by: INTERNAL MEDICINE

## 2022-07-03 PROCEDURE — 36415 COLL VENOUS BLD VENIPUNCTURE: CPT | Performed by: STUDENT IN AN ORGANIZED HEALTH CARE EDUCATION/TRAINING PROGRAM

## 2022-07-03 PROCEDURE — 82248 BILIRUBIN DIRECT: CPT | Performed by: INTERNAL MEDICINE

## 2022-07-03 PROCEDURE — 93975 VASCULAR STUDY: CPT

## 2022-07-03 PROCEDURE — 83735 ASSAY OF MAGNESIUM: CPT | Performed by: INTERNAL MEDICINE

## 2022-07-03 PROCEDURE — 83605 ASSAY OF LACTIC ACID: CPT | Performed by: STUDENT IN AN ORGANIZED HEALTH CARE EDUCATION/TRAINING PROGRAM

## 2022-07-03 PROCEDURE — 82746 ASSAY OF FOLIC ACID SERUM: CPT | Performed by: INTERNAL MEDICINE

## 2022-07-03 PROCEDURE — 99233 SBSQ HOSP IP/OBS HIGH 50: CPT | Performed by: INTERNAL MEDICINE

## 2022-07-03 PROCEDURE — 85025 COMPLETE CBC W/AUTO DIFF WBC: CPT | Performed by: INTERNAL MEDICINE

## 2022-07-03 RX ORDER — ALBUMIN (HUMAN) 12.5 G/50ML
25 SOLUTION INTRAVENOUS ONCE
Status: COMPLETED | OUTPATIENT
Start: 2022-07-03 | End: 2022-07-03

## 2022-07-03 RX ORDER — FUROSEMIDE 20 MG
40 TABLET ORAL DAILY
Status: DISCONTINUED | OUTPATIENT
Start: 2022-07-03 | End: 2022-07-04

## 2022-07-03 RX ORDER — SPIRONOLACTONE 25 MG/1
50 TABLET ORAL DAILY
Status: DISCONTINUED | OUTPATIENT
Start: 2022-07-03 | End: 2022-07-05 | Stop reason: HOSPADM

## 2022-07-03 RX ORDER — POTASSIUM CHLORIDE 1500 MG/1
40 TABLET, EXTENDED RELEASE ORAL ONCE
Status: COMPLETED | OUTPATIENT
Start: 2022-07-03 | End: 2022-07-03

## 2022-07-03 RX ADMIN — SERTRALINE HYDROCHLORIDE 50 MG: 50 TABLET ORAL at 08:33

## 2022-07-03 RX ADMIN — POTASSIUM CHLORIDE 20 MEQ: 1500 TABLET, EXTENDED RELEASE ORAL at 01:07

## 2022-07-03 RX ADMIN — METOPROLOL SUCCINATE 25 MG: 25 TABLET, EXTENDED RELEASE ORAL at 08:32

## 2022-07-03 RX ADMIN — FUROSEMIDE 40 MG: 20 TABLET ORAL at 15:02

## 2022-07-03 RX ADMIN — POTASSIUM CHLORIDE 40 MEQ: 1500 TABLET, EXTENDED RELEASE ORAL at 08:32

## 2022-07-03 RX ADMIN — VANCOMYCIN HYDROCHLORIDE 125 MG: 125 CAPSULE ORAL at 11:49

## 2022-07-03 RX ADMIN — SODIUM CHLORIDE 500 ML: 9 INJECTION, SOLUTION INTRAVENOUS at 01:39

## 2022-07-03 RX ADMIN — VANCOMYCIN HYDROCHLORIDE 125 MG: 125 CAPSULE ORAL at 08:32

## 2022-07-03 RX ADMIN — Medication 1 MG: at 22:04

## 2022-07-03 RX ADMIN — PANTOPRAZOLE SODIUM 40 MG: 40 TABLET, DELAYED RELEASE ORAL at 19:42

## 2022-07-03 RX ADMIN — PANTOPRAZOLE SODIUM 40 MG: 40 TABLET, DELAYED RELEASE ORAL at 08:32

## 2022-07-03 RX ADMIN — SPIRONOLACTONE 50 MG: 25 TABLET, FILM COATED ORAL at 13:11

## 2022-07-03 RX ADMIN — CLONIDINE HYDROCHLORIDE 0.1 MG: 0.1 TABLET ORAL at 22:04

## 2022-07-03 RX ADMIN — VANCOMYCIN HYDROCHLORIDE 125 MG: 125 CAPSULE ORAL at 19:06

## 2022-07-03 RX ADMIN — ALBUMIN HUMAN 25 G: 0.25 SOLUTION INTRAVENOUS at 15:04

## 2022-07-03 RX ADMIN — Medication 1 MG: at 01:07

## 2022-07-03 RX ADMIN — VANCOMYCIN HYDROCHLORIDE 125 MG: 125 CAPSULE ORAL at 22:04

## 2022-07-03 ASSESSMENT — ACTIVITIES OF DAILY LIVING (ADL)
ADLS_ACUITY_SCORE: 18
ADLS_ACUITY_SCORE: 20
ADLS_ACUITY_SCORE: 20
ADLS_ACUITY_SCORE: 18
ADLS_ACUITY_SCORE: 20
ADLS_ACUITY_SCORE: 18
ADLS_ACUITY_SCORE: 20

## 2022-07-03 NOTE — PLAN OF CARE
Problem: Electrolyte Imbalance  Goal: Electrolyte Balance  Outcome: Ongoing, Progressing     Problem: Anemia  Goal: Anemia Symptom Improvement  Outcome: Ongoing, Progressing  Intervention: Monitor and Manage Anemia  Recent Flowsheet Documentation  Taken 7/3/2022 0420 by Vanessa Monk RN  Safety Promotion/Fall Prevention:    activity supervised    assistive device/personal items within reach    clutter free environment maintained    nonskid shoes/slippers when out of bed    safety round/check completed     Goal Outcome Evaluation:        Patient triggered sepsis protocol with Lactid acid 4.6. Code sepsis called.  Patient at the time havign low BP 86/53 ([id number]) and 90/54 when recheck, states feels somehow lightheaded with movements. Denies sob at rest, chest pain and any other cardiac symptoms.   Complaining of painful legs and hands cramps. Patient already on K and Mg protocol.   Discussed with Dr. Anderson, who ordered a bolus of  and to recheck Mg and K.    Mg wdl and K improving, K to be recheck in the morning.   Per patient report painful cramps improving overnight.   Telemetry with NSR and some sinus tachy at times.     Critical lab called with Hgb 6.4. 1 U of RBC ordered and given with no issues.     Lungs clear, denies sob at rest, some sob with exertion. Legs edematous.     Patient had one watery bowel movement tonight, per her report yellow colored. Patient on Enteric precautions as she recently had C. Diff and is still on po antibiotics.     No fevers overnight.   Blood cultures obtained at the time Lactic was obtain.     GI consult pending.

## 2022-07-03 NOTE — PLAN OF CARE
"PRIMARY DIAGNOSIS: \"Hypokalemia  OUTPATIENT/OBSERVATION GOALS TO BE MET BEFORE DISCHARGE:  1. ADLs back to baseline: Yes    2. Activity and level of assistance: Ambulating independently.    3. Pain status: Pain free.    4. Return to near baseline physical activity: Yes     Discharge Planner Nurse   Safe discharge environment identified: No  Barriers to discharge: Yes       Entered by: Fatimah Jeronimo RN 07/02/2022 10:32 PM   Pt had 2 critical labs today, potassium at 2.4 and lactic acid at 4.0. Pt is on K protocol and was initiated. She received 40 mEq of K and MD was notified. Pt has K recheck and blood culture scheduled for tonight. Will continue to monitor.  Please review provider order for any additional goals.   Nurse to notify provider when observation goals have been met and patient is ready for discharge.Goal Outcome Evaluation:                      "

## 2022-07-03 NOTE — PLAN OF CARE
Goal Outcome Evaluation:      [id number]... Pt is a/o x4, independent at baseline, denies pain and has been up and about in her room. Pt received a unit of blood overnight for Hgb of 6.4. Post transfusion status has been stable, Hgb is now 9.1. Pt is also on K and Mag protocols, K was 3.2 and she got oral replacement, recheck was 4.1. She's scheduled for abdominal ultrasound at 5 pm, NPO maintained. Will continue to monitor.     [id number]... Pt had an abdominal ultrasound done today and it shows moderate amount of intra-abdominal ascites, borderline splenomegaly and cirrhotic appearance of the liver. Pt was started on Lasix daily. Will continue to monitor.

## 2022-07-03 NOTE — CONSULTS
"[facility] DIGESTIVE HEALTH CONSULTATION    Lori Jean-Baptiste   [address]   [facility] [address]  56 year old female    Admission Date/Time: 7/2/2022  9:15 AM    Primary Care Provider:  Mehnaz Gaspar    Requesting Physician: Dell Mendoza MD      CHIEF COMPLAINT:  Ascites    REASON FOR THE CONSULT:  Ascites, cirrhosis    HPI:   56 year old yo F with EtOH cirrhosis complicated by HE, ascites, small esophageal varices, recent hospitalization at [facility] 2/2 UGIB (EGD with severe LA grade D esophagitis, small varices NO banding,) diuretics stopped at discharge presumably due to hypovolemia 2/2 blood loss, diarrhea (Crea 1.07,) presents with increasing ascites and LE edema. Pt reports taking furosemide PRN however had not been taking regularly prior to admission. Started a few doses due to swelling and ascites. Denies low sodium diet. Diagnosed with Cdiff at [facility], started on vancomycin 125 mg QID to complete course on 7/5/22, however missed ~2 days of antibiotics. Upon evaluation, noted to have pancytopenia. Denies melena, hematochezia, nausea, emesis. Paracentesis performed >3L removed. Non-bloody. Pt reports abdominal distension improved. Denies EtOH since 12/2021. Diarrhea improved; reports passing ~3 soft BM since admission. Typically takes lactulose PRN for \"confusion.\"  Hgb 6.4, transfused 1 unit(s) prbc. Potassium <3.    EGD (6/20/22) LA Grade D erosive esophagitis with no active bleeding. This was likely source of patient's hematemesis. No specimens collected in setting of small esophageal varices. Portal hypertensive gastropathy. Duodenitis. Normal first portion of the duodenum and second portion of the duodenum.     Previous EGD 5/2022 with concern for Candida, treated with fluconazole. Small varices.      REVIEW OF SYSTEMS:   10 point ROS neg other than the symptoms noted above in the HPI.    MEDICATIONS:  Current Outpatient Medications   Medication Instructions     cloNIDine " "(CATAPRES) 0.1 mg, AT BEDTIME     metoprolol succinate ER (TOPROL XL) 25 MG 24 hr tablet 1 tablet, Oral, DAILY     pantoprazole (PROTONIX) 40 mg, Oral, 2 TIMES DAILY     sertraline (ZOLOFT) 50 mg, DAILY     vancomycin (VANCOCIN) 125 mg, Oral, 4 TIMES DAILY, For 12 days     zolpidem (AMBIEN) 5 mg, AT BEDTIME PRN       PAST MEDICAL HISTORY:  History reviewed. No pertinent past medical history.    PAST SURGICAL HISTORY:  Past Surgical History:   Procedure Laterality Date     ESOPHAGOSCOPY, GASTROSCOPY, DUODENOSCOPY (EGD), COMBINED N/A 4/11/2017    Procedure: ESOPHAGOGASTRODUODENOSCOPY (EGD) with biopsy;  Surgeon: Jesus Woods MD;  Location: [facility];  Service:      ESOPHAGOSCOPY, GASTROSCOPY, DUODENOSCOPY (EGD), COMBINED N/A 2/8/2018    Procedure: ESOPHAGOGASTRODUODENOSCOPY (EGD);  Surgeon: Sherif Valentine MD;  Location: [facility];  Service:      LAPAROSCOPIC CHOLECYSTECTOMY N/A 3/16/2017    Procedure: LAPAROSCOPIC CHOLECYSTECTOMY, ;  Surgeon: Jesus Ramirez DO;  Location: [facility] OR;  Service:      OTHER SURGICAL HISTORY      left forearm fracturewith hardware       FAMILY HISTORY:  Family History   Problem Relation Age of Onset     Lung Cancer Father        SOCIAL HISTORY:  Social History     Tobacco Use     Smoking status: Current Some Day Smoker     Types: Cigarettes, Cigarettes     Smokeless tobacco: Never Used     Tobacco comment: approx. 1 a week   Substance Use Topics     Alcohol use: No     Comment: Alcoholic Drinks/day: sober since 11/2016       ALLERGIES/SENSITIVITIES:  Patient has no known allergies.      PHYSICAL EXAM:  /52 (BP Location: Left arm)   Pulse 88   Temp 97.9  F (36.6  C) (Oral)   Resp 16   Ht 1.575 m (5' 2\")   Wt 61.7 kg (136 lb)   SpO2 93%   BMI 24.87 kg/m    Body mass index is 24.87 kg/m .  General: A&O, NAD, non-toxic appearing  Eyes: No icterus or conjunctivitis  ENT: MMM, OP clear without ulcerations  Neck/Thyroid: Supple, no masses  Pulmonary: CTA " B anteriorly, no wheezing  Cardiovascular: RR, S1, S2  Gastrointestinal: Soft, non-distended, +ascites, NTTP, no r/g  Skin: No jaundice/petechiae/rashes  Lymph: No cervical or supraclavicular LAD  Extrem: PPI, no c/c, 1-2+ pitting edema LE bilaterally      LABORATORY DATA:  CBC:  Recent Labs   Lab Test 07/03/22  0845 07/03/22 0221   WBC  --  3.7*   RBC  --  2.29*   HGB 9.1* 6.4*   HCT  --  20.6*   MCV  --  90   MCH  --  27.9   MCHC  --  31.1*   RDW  --  17.7*   PLT  --  106*        BMP:  Recent Labs   Lab 07/03/22  0604 07/03/22 0221 07/02/22  2101 07/02/22  1637 07/02/22  1118 07/02/22  1118 06/29/22  1118   NA  --  134*  --   --   --  136 136   POTASSIUM 3.2* 3.1* 2.4*  --    < > 2.3* 3.1*   CHLORIDE  --  103  --   --   --  101 102   CO2  --  25  --   --   --  21* 23   GLC  --  103  --  104*  --  95 107*   CR  --  0.56*  --   --   --  0.62 0.73   BUN  --  6*  --   --   --  5* 6.1    < > = values in this interval not displayed.       INR:  Recent Labs   Lab Test 07/02/22  1118   INR 1.29*       Liver and Pancreas panel:  Recent Labs   Lab 07/03/22 0221 07/02/22  1118 06/29/22  1118   AST 61* 72* 63*   ALT 25 32 39*   ALKPHOS 99 113 118*   BILITOTAL 1.2* 1.9* 1.3*     Lactate 4      IMAGING:    Chest XR,  PA & LAT    Result Date: 7/2/2022  EXAM: XR CHEST 2 VW LOCATION: [facility] DATE/TIME: 7/2/2022 10:16 AM INDICATION: dyspnea COMPARISON: 12/13/2018     IMPRESSION: There is plate like atelectasis at the right lung base new from prior study. No pleural effusions. Heart size is normal. Bony demineralization. Surgical clips right upper quadrant.    US Paracentesis without Albumin    Result Date: 7/2/2022  EXAM: 1. PARACENTESIS 2. ULTRASOUND GUIDANCE LOCATION: [facility] DATE/TIME: 7/2/2022 10:28 AM INDICATION: Ascites. PROCEDURE: Informed consent obtained. Time out performed. The abdomen was prepped and draped in a sterile fashion. 9  mL of 1% lidocaine was infused  into local soft tissues. A 5 Faroese catheter system was introduced into the abdominal ascites under ultrasound guidance. 3.55  liters of clear fluid were removed and sent to lab if requested. Patient tolerated procedure well. Ultrasound imaging was obtained and placed in the patient's permanent medical record.     IMPRESSION: 1.  Status post ultrasound-guided paracentesis. Reference CPT Code: 93883    Cell count 141 Nuc cells, 39 RBC  NGTD    Bld cx pending      ASSESSMENT:   1. Cirrhosis - 2/2 EtOH, hx HCV. Denies EtOH since 12/2021. Complicated by ascites, recent worsening. Small esophageal varices. MELD 10  2. Anemia - Assoc pancytopenia. No overt GIB.  3. Ascites - Pt reports new onset/worsening since hospitalization. Had been taking diuretics PRN, recently started a few doses prior to hospitalization however without benefit.  4. Cdiff - Started treatment following [facility] hospitalization (6/21-7/5.) Diarrhea improved. Missed ~2 days doses.  5. Hypokalemia - Diarrhea and restarting furosemide likely contributing.   6. Esophagitis - Severe reflux. Previous Candida. On PPI. Symptoms improved.    PLAN:  -Obtain abdominal US with doppler to assess portal/hepatic vessels.  -Continue PPI BID  -Anti-reflux recommendations.  -Replace K  -Low sodium diet.  -Check PEth level.  -Obtain peripheral smear, retic, haptoglobin, LDH, iron, B12, folate.  -Follow up cultures.  -Continue vancomycin 125 mg QID for 4 additional days.  -Hold lactulose until patient completes vancomycin. Dose adjust to ensure passing 2-3 soft BM daily.  -Start spironolactone 50 mg once daily.  -Daily MELD labs. Pending K levels, may resume furosemide 20 mg.    Approximately 40 minutes of total time was spent providing patient care including patient evaluation, reviewing documentation/test results, and .             Fish Colon MD  Thank you for the opportunity to participate in the care of this patient.   Please feel free to call me with  any questions or concerns.  Phone number [phone].            CC: [facility], Mehnaz Gaspar

## 2022-07-03 NOTE — PLAN OF CARE
Problem: Plan of Care - These are the overarching goals to be used throughout the patient stay.    Goal: Readiness for Transition of Care  Intervention: Mutually Develop Transition Plan  Recent Flowsheet Documentation  Taken 7/2/2022 2100 by Fatimah Jeronimo RN  Equipment Currently Used at Home: none   Goal Outcome Evaluation:        1800... Pt was admitted via the ED with Hypokalemia. He has history of alcoholic cirrhosis of the liver with ascitis. Pt had Paracentesis done prior to coming on the unit, and 4 liters was removed. Pt is a/o x4, independent at baseline. Sepsis protocol was initiated due to some abnormal vitals, Lactic acid came back at 4.0, pt is asymptomatic. MD was notified and pt was assessed by the House Officer. New order for lactic acid to be rechecked at midnight. Pt also has order for blood culture for tonight. Will continue to monitor.

## 2022-07-03 NOTE — PROVIDER NOTIFICATION
Resident paged with Code Sepsis as patient triggered protocol with Lactid acid 4.6.  Patient at the time havign low BP 86/53 ([id number]) and 90/54 when recheck, states feels somehow lightheaded with movements. Denies sob, chest pain and any other cardiac symptoms.   Complaining of painful legs and hands cramps. Patient already on K and Mg protocol.   Discussed with Dr. Anderson, who ordered a bolus of  and to recheck Mg and K.

## 2022-07-03 NOTE — SIGNIFICANT EVENT
"Significant Event Note    Time of event: 8:40 PM July 2, 2022    Description of event:  Elevated lactate    Notified by nurse of elevated lactate of 4.0. Patient is admitted with EtOH cirrhosis with significant ascites requiring paracentesis.     Patient feeling well, no subjective fevers.       /62   Pulse 96   Temp 98.4  F (36.9  C) (Oral)   Resp 20   Ht 1.575 m (5' 2\")   Wt 61.7 kg (136 lb)   SpO2 100%   BMI 24.87 kg/m    Gen: Resting comfortably in bed  Resp: No increased work of breathing  CV: RRR  Abd: Slightly distended, non-tender to palpation    Plan:  Lactic acidosis  Patient does not appear septic, she likely has the elevated lactate due to her cirrhosis. Will get a repeat lactate in about 4 hours to make sure it is not climbing. Do not think she needs fluids at this time, especially in the setting of her low albumin, she would likely third space the fluid.     Addendum 12:57am 7/3:  Lactate elevated to 4.6. Patient still appearing well. No need to continue trending lactate.    Addendum 1:18am 7/3  Called back by nurse. BP 90/54. Pt lightheaded, will give 500ml NS bolus. Ordered Mg and K levels due to previously being low. Hb will be rechecked in the morning.    Hgb resulted back at 6.4, one unit pRBC ordered.     Discussed with: bedside nurse    MD Elizabeth Chandra MD  "

## 2022-07-03 NOTE — PROGRESS NOTES
Care Management Follow Up    Length of Stay (days): 1    Expected Discharge Date:  07/05/2022     Concerns to be Addressed:  GI status       Patient plan of care discussed at interdisciplinary rounds: Yes    Anticipated Discharge Disposition:  home     Anticipated Discharge Services:  TBD  Anticipated Discharge DME: per initial consult she wanted a walker     Additional Information:  Patient is admitted with ETOH cirrhosis with significant ascites requiring paracentesis.     Social History:  Patient lives in her apartment alone. She has support from her fiance. She has a [facility]/CADI waiver. [facility] will transport at discharge.    Anticipating patient will return home at discharge.  Final discharge needs pending progression and recommendations.     Tere Bolaños RN

## 2022-07-03 NOTE — PROGRESS NOTES
[facility]    Medicine Progress Note - Hospitalist Service    Date of Admission:  7/2/2022    Assessment & Plan                  Lori Jean-Baptiste is a 56 year old female admitted on 7/2/2022. She has a h/o alcoholic cirrhosis and complications.      Recent hospitalization at [facility] 3-4 days back for GI bleed and found to havepeptic ulcer and received 4 units of blood, hb is in low 7's but no h/o rectal bleeding, black stools or hematemesis.     She has been admitted with abdominal distension and sob due to abdominal distension. She had significant ascites related to her CLD and underwent paracentesis in ER and 4 liters of fluid was aspirated and send for analysis. Sob has improved post paracentesis. She also has significant anasarca and has not been  on diuretics in past. Patient got 40 mg of iv lasix 1 dose in ER and  will hold off further diuresis at this time considering 4 liters of fluids has been removed during paracentesis. Will consult GI. Patient has a h/o chronic alcohol dependence but per her information she has been sober since 12/2021         7/3     abdomen seems to be getting  Distended again although better after paracentesis  Plan for abdominal US with doppler to assess portal/hepatic vessels  Obtain peripheral smear, retic, haptoglobin, LDH, iron, B12, folate.  -Follow up cultures.    Hb dropped < 7, s/p 1 unit prbc  No h/o blood in stools or black stools    Started on spironolactone and lasix.    C/o Muscle cramps, will give iv albumin 1 dose      Not medically ready for discharge at this time.  Possible discharge in 1-2 days once hb stable and if not needing repeat paracentesis and anasarca better     A/p :              Abdominal distension     Ascites 2/2 CLD    H/o  Cirrhosis 2/2 alcohol and hepatitis C - per information from patient    Recent hospitalization at [facility] 3-4 days back for GI bleed and found to havepeptic ulcer and received 4 units of blood, hb is in  low 7's but no h/o rectal bleeding, black stools or hematemesis.     She has been admitted with abdominal distension and sob due to abdominal distension. She had significant ascites related to her CLD and underwent paracentesis in ER and 4 liters of fluid was aspirated and send for analysis. Sob has improved post paracentesis. She also has significant anasarca and has not been  on diuretics in past. Patient got 40 mg of iv lasix 1 dose in ER and  will hold off further diuresis at this time considering 4 liters of fluids has been removed during paracentesis. Will consult GI. Patient has a h/o chronic alcohol dependence but per her information she has been sober since 12/2021          Tachycardia, Sinus : no convincing evidence of sepsis, monitor       HTN, Essential : on metoprolol 25 mg daily, clonidine 0.1 mg at bedtime - at home.       Anxiety/Depression : on zoloft 50 mg daily, ambien 5 mg at bedtime prn -at home.       Recent C diff at [facility] : on oral vancomycin 125 mg qid for 12 days       Chronic alcohol dependence : sober since 12/2021       Hypokalemia : supplement prn       Metabolic acidosis : montior.       Hypomagnesemia : supplement prn       Anemia : Hb at 7.6 : runs around 7's -8's, no h/o GI bleed today, monitor, transfuse prn to keep hb > 7                  Diet: 2 Gram Sodium Diet    DVT Prophylaxis: Pneumatic Compression Devices  Nelson Catheter: Not present  Central Lines: None  Cardiac Monitoring: ACTIVE order. Indication: Electrolyte Imbalance (24 hours)- Magnesium <1.3 mg/ml; Potassium < =2.8 or > 5.5 mg/ml  Code Status: Full Code      Disposition Plan      Expected Discharge Date: 07/05/2022    Discharge Delays: Other (Add Comment)    Discharge Comments: GI status        The patient's care was discussed with the Bedside Nurse and Patient.    Dell Mendoza MD  Hospitalist Service  [facility]  Securely message with the Vocera Web Console (learn more  here)  Text page via [facility] Paging/Directory         Clinically Significant Risk Factors Present on Admission                     ______________________________________________________________________      Data reviewed today: I reviewed all medications, new labs and imaging results over the last 24 hours. I personally reviewed no images or EKG's today.    Physical Exam   Vital Signs: Temp: 97.9  F (36.6  C) Temp src: Oral BP: 104/52 Pulse: 88   Resp: 16 SpO2: 93 % O2 Device: None (Room air)    Weight: 136 lbs 0 oz       GENERAL: The patient is not in any acute distressed. Awake and alert.  HEENT: Nonicteric sclerae, PERRLA, EOMI. Oropharynx clear. Moist mucous membranes. Conjunctivae appear well perfused.  HEART: Regular rate and rhythm without murmurs.  LUNGS: Clear to auscultation bilaterally. No wheezing or crackles.  ABDOMEN: Soft, positive bowel sounds, nontender.  SKIN: No rash, no excessive bruising, petechiae, or purpura.  EXTREMITIES : no rashes, no swelling in legs.  NEUROLOGIC: conscious and oriented, follows commands, no obvious focal deficits.  ROS: All other systems negative     Data   Recent Labs   Lab 07/03/22  1420 07/03/22  1220 07/03/22  0845 07/03/22  0604 07/03/22  0221 07/02/22  2101 07/02/22  1637 07/02/22  1118 06/29/22  1118 06/29/22  1118   WBC 4.5  --   --   --  3.7*  --   --  4.1  --   --    HGB 8.8*  --  9.1*  --  6.4*  --   --  7.6*   < >  --    MCV 92  --   --   --  90  --   --  88  --   --    *  --   --   --  106*  --   --  143*  --   --    INR  --   --   --   --   --   --   --  1.29*  --   --    NA  --   --   --   --  134*  --   --  136  --  136   POTASSIUM  --  4.1  --  3.2* 3.1*   < >  --  2.3*  --  3.1*   CHLORIDE  --   --   --   --  103  --   --  101  --  102   CO2  --   --   --   --  25  --   --  21*  --  23   BUN  --   --   --   --  6*  --   --  5*  --  6.1   CR  --   --   --   --  0.56*  --   --  0.62  --  0.73   ANIONGAP  --   --   --   --  6  --   --  14  --  11   CARLOS   --   --   --   --  7.7*  --   --  7.9*  --  7.7*   GLC  --   --   --   --  103  --  104* 95  --  107*   ALBUMIN  --   --   --   --  2.1*  --   --  2.5*  --  2.9*   PROTTOTAL  --   --   --   --  4.8*  --   --  5.7*  --  5.3*   BILITOTAL  --   --   --   --  1.2*  --   --  1.9*  --  1.3*   ALKPHOS  --   --   --   --  99  --   --  113  --  118*   ALT  --   --   --   --  25  --   --  32  --  39*   AST  --   --   --   --  61*  --   --  72*  --  63*    < > = values in this interval not displayed.

## 2022-07-04 LAB
ANION GAP SERPL CALCULATED.3IONS-SCNC: 6 MMOL/L (ref 5–18)
ATRIAL RATE - MUSE: 113 BPM
BASOPHILS # BLD AUTO: 0 10E3/UL (ref 0–0.2)
BASOPHILS NFR BLD AUTO: 0 %
BLD PROD TYP BPU: NORMAL
BLOOD COMPONENT TYPE: NORMAL
BUN SERPL-MCNC: 5 MG/DL (ref 8–22)
CALCIUM SERPL-MCNC: 7.8 MG/DL (ref 8.5–10.5)
CHLORIDE BLD-SCNC: 103 MMOL/L (ref 98–107)
CO2 SERPL-SCNC: 26 MMOL/L (ref 22–31)
CODING SYSTEM: NORMAL
CREAT SERPL-MCNC: 0.63 MG/DL (ref 0.6–1.1)
CROSSMATCH: NORMAL
DIASTOLIC BLOOD PRESSURE - MUSE: 80 MMHG
EOSINOPHIL # BLD AUTO: 0.1 10E3/UL (ref 0–0.7)
EOSINOPHIL NFR BLD AUTO: 2 %
ERYTHROCYTE [DISTWIDTH] IN BLOOD BY AUTOMATED COUNT: 17.3 % (ref 10–15)
FOLATE SERPL-MCNC: 8 NG/ML (ref 4.6–34.8)
GFR SERPL CREATININE-BSD FRML MDRD: >90 ML/MIN/1.73M2
GLUCOSE BLD-MCNC: 107 MG/DL (ref 70–125)
HCT VFR BLD AUTO: 23.1 % (ref 35–47)
HGB BLD-MCNC: 7.1 G/DL (ref 11.7–15.7)
IMM GRANULOCYTES # BLD: 0 10E3/UL
IMM GRANULOCYTES NFR BLD: 0 %
INTERPRETATION ECG - MUSE: NORMAL
ISSUE DATE AND TIME: NORMAL
LACTATE SERPL-SCNC: 1.3 MMOL/L (ref 0.7–2)
LYMPHOCYTES # BLD AUTO: 0.8 10E3/UL (ref 0.8–5.3)
LYMPHOCYTES NFR BLD AUTO: 29 %
MAGNESIUM SERPL-MCNC: 1.4 MG/DL (ref 1.8–2.6)
MAGNESIUM SERPL-MCNC: 2.1 MG/DL (ref 1.8–2.6)
MCH RBC QN AUTO: 28.5 PG (ref 26.5–33)
MCHC RBC AUTO-ENTMCNC: 30.7 G/DL (ref 31.5–36.5)
MCV RBC AUTO: 93 FL (ref 78–100)
MONOCYTES # BLD AUTO: 0.3 10E3/UL (ref 0–1.3)
MONOCYTES NFR BLD AUTO: 12 %
NEUTROPHILS # BLD AUTO: 1.6 10E3/UL (ref 1.6–8.3)
NEUTROPHILS NFR BLD AUTO: 57 %
NRBC # BLD AUTO: 0 10E3/UL
NRBC BLD AUTO-RTO: 0 /100
P AXIS - MUSE: 75 DEGREES
PLATELET # BLD AUTO: 82 10E3/UL (ref 150–450)
POTASSIUM BLD-SCNC: 3.6 MMOL/L (ref 3.5–5)
PR INTERVAL - MUSE: 156 MS
QRS DURATION - MUSE: 66 MS
QT - MUSE: 376 MS
QTC - MUSE: 515 MS
R AXIS - MUSE: 27 DEGREES
RBC # BLD AUTO: 2.49 10E6/UL (ref 3.8–5.2)
SODIUM SERPL-SCNC: 135 MMOL/L (ref 136–145)
SYSTOLIC BLOOD PRESSURE - MUSE: 137 MMHG
T AXIS - MUSE: 41 DEGREES
UNIT ABO/RH: NORMAL
UNIT NUMBER: NORMAL
UNIT STATUS: NORMAL
UNIT TYPE ISBT: 5100
VENTRICULAR RATE- MUSE: 113 BPM
WBC # BLD AUTO: 2.7 10E3/UL (ref 4–11)

## 2022-07-04 PROCEDURE — 83605 ASSAY OF LACTIC ACID: CPT | Performed by: INTERNAL MEDICINE

## 2022-07-04 PROCEDURE — 250N000011 HC RX IP 250 OP 636: Performed by: INTERNAL MEDICINE

## 2022-07-04 PROCEDURE — 85025 COMPLETE CBC W/AUTO DIFF WBC: CPT | Performed by: INTERNAL MEDICINE

## 2022-07-04 PROCEDURE — 82310 ASSAY OF CALCIUM: CPT | Performed by: INTERNAL MEDICINE

## 2022-07-04 PROCEDURE — 36415 COLL VENOUS BLD VENIPUNCTURE: CPT | Performed by: INTERNAL MEDICINE

## 2022-07-04 PROCEDURE — P9016 RBC LEUKOCYTES REDUCED: HCPCS | Performed by: INTERNAL MEDICINE

## 2022-07-04 PROCEDURE — 250N000013 HC RX MED GY IP 250 OP 250 PS 637

## 2022-07-04 PROCEDURE — 120N000001 HC R&B MED SURG/OB

## 2022-07-04 PROCEDURE — 83735 ASSAY OF MAGNESIUM: CPT | Performed by: INTERNAL MEDICINE

## 2022-07-04 PROCEDURE — 250N000013 HC RX MED GY IP 250 OP 250 PS 637: Performed by: INTERNAL MEDICINE

## 2022-07-04 PROCEDURE — 99233 SBSQ HOSP IP/OBS HIGH 50: CPT | Performed by: INTERNAL MEDICINE

## 2022-07-04 RX ORDER — FUROSEMIDE 20 MG
20 TABLET ORAL DAILY
Status: DISCONTINUED | OUTPATIENT
Start: 2022-07-05 | End: 2022-07-05 | Stop reason: HOSPADM

## 2022-07-04 RX ORDER — PRAMOXINE HYDROCHLORIDE 10 MG/G
AEROSOL, FOAM TOPICAL 3 TIMES DAILY
Status: DISCONTINUED | OUTPATIENT
Start: 2022-07-04 | End: 2022-07-05 | Stop reason: HOSPADM

## 2022-07-04 RX ORDER — MAGNESIUM SULFATE 4 G/50ML
4 INJECTION INTRAVENOUS ONCE
Status: COMPLETED | OUTPATIENT
Start: 2022-07-04 | End: 2022-07-04

## 2022-07-04 RX ADMIN — PANTOPRAZOLE SODIUM 40 MG: 40 TABLET, DELAYED RELEASE ORAL at 07:45

## 2022-07-04 RX ADMIN — FUROSEMIDE 40 MG: 20 TABLET ORAL at 09:44

## 2022-07-04 RX ADMIN — VANCOMYCIN HYDROCHLORIDE 125 MG: 125 CAPSULE ORAL at 12:09

## 2022-07-04 RX ADMIN — PRAMOXINE HYDROCHLORIDE 1 APPLICATOR: 10 AEROSOL, FOAM TOPICAL at 09:41

## 2022-07-04 RX ADMIN — SERTRALINE HYDROCHLORIDE 50 MG: 50 TABLET ORAL at 07:46

## 2022-07-04 RX ADMIN — SPIRONOLACTONE 50 MG: 25 TABLET, FILM COATED ORAL at 09:44

## 2022-07-04 RX ADMIN — PANTOPRAZOLE SODIUM 40 MG: 40 TABLET, DELAYED RELEASE ORAL at 20:03

## 2022-07-04 RX ADMIN — VANCOMYCIN HYDROCHLORIDE 125 MG: 125 CAPSULE ORAL at 16:32

## 2022-07-04 RX ADMIN — Medication 1 MG: at 21:31

## 2022-07-04 RX ADMIN — MAGNESIUM SULFATE 4 G: 4 INJECTION INTRAVENOUS at 07:46

## 2022-07-04 RX ADMIN — VANCOMYCIN HYDROCHLORIDE 125 MG: 125 CAPSULE ORAL at 21:31

## 2022-07-04 RX ADMIN — VANCOMYCIN HYDROCHLORIDE 125 MG: 125 CAPSULE ORAL at 07:45

## 2022-07-04 RX ADMIN — PRAMOXINE HYDROCHLORIDE: 10 AEROSOL, FOAM TOPICAL at 21:32

## 2022-07-04 ASSESSMENT — ACTIVITIES OF DAILY LIVING (ADL)
ADLS_ACUITY_SCORE: 18

## 2022-07-04 NOTE — PROGRESS NOTES
[facility]    Medicine Progress Note - Hospitalist Service    Date of Admission:  7/2/2022    Assessment & Plan                    Lori Jean-Baptiste is a 56 year old female admitted on 7/2/2022. She has a h/o alcoholic cirrhosis and complications.      Recent hospitalization at [facility] 3-4 days back for GI bleed and found to havepeptic ulcer and received 4 units of blood, hb is in low 7's but no h/o rectal bleeding, black stools or hematemesis.     She has been admitted with abdominal distension and sob due to abdominal distension. She had significant ascites related to her CLD and underwent paracentesis in ER and 4 liters of fluid was aspirated and send for analysis. Sob has improved post paracentesis. She also has significant anasarca and has not been  on diuretics in past. Patient got 40 mg of iv lasix 1 dose in ER and  will hold off further diuresis at this time considering 4 liters of fluids has been removed during paracentesis. Will consult GI. Patient has a h/o chronic alcohol dependence but per her information she has been sober since 12/2021 7/4      abdomen seems to be getting  Distended again although better after paracentesis  Plan for abdominal US with doppler to assess portal/hepatic vessels : moderate ascites  Obtain peripheral smear, retic, haptoglobin, LDH, iron, B12, folate : pending  -Follow up cultures.    Hb dropped in low 7's, plan for 1 unit prbc  Hb low again today  No h/o blood in stools or black stools    BP soft today  Reduced dose of lasix, metoprolol, stopped clonidine, continue spironolactone    C/o Muscle cramps, will give iv albumin 1 dose - improving      Not medically ready for discharge at this time.  Possible discharge in 1-2 days once hb stable and may  repeat paracentesis      A/p :              Abdominal distension     Ascites 2/2 CLD    H/o  Cirrhosis 2/2 alcohol and hepatitis C - per information from patient    Recent hospitalization at  [facility] 3-4 days back for GI bleed and found to havepeptic ulcer and received 4 units of blood, hb is in low 7's but no h/o rectal bleeding, black stools or hematemesis.     She has been admitted with abdominal distension and sob due to abdominal distension. She had significant ascites related to her CLD and underwent paracentesis in ER and 4 liters of fluid was aspirated and send for analysis. Sob has improved post paracentesis. She also has significant anasarca and has not been  on diuretics in past. Patient got 40 mg of iv lasix 1 dose in ER and  will hold off further diuresis at this time considering 4 liters of fluids has been removed during paracentesis. Will consult GI. Patient has a h/o chronic alcohol dependence but per her information she has been sober since 12/2021          Tachycardia, Sinus : no convincing evidence of sepsis, monitor       HTN, Essential : on metoprolol 25 mg daily, clonidine 0.1 mg at bedtime - at home.       Anxiety/Depression : on zoloft 50 mg daily, ambien 5 mg at bedtime prn -at home.       Recent C diff at [facility] hospital : on oral vancomycin 125 mg qid for 12 days       Chronic alcohol dependence : sober since 12/2021       Hypokalemia : supplement prn      Hyponatremia : related to chronic liver disease      Pancytopenia 2/2 liver disease       Metabolic acidosis : montior.       Hypomagnesemia : supplement prn       Anemia : Hb at 7.6 : runs around 7's -8's, no h/o GI bleed today, monitor, transfuse prn to keep hb > 7                  Diet: 2 Gram Sodium Diet    DVT Prophylaxis: Pneumatic Compression Devices  Nelson Catheter: Not present  Central Lines: None  Cardiac Monitoring: ACTIVE order. Indication: Electrolyte Imbalance (24 hours)- Magnesium <1.3 mg/ml; Potassium < =2.8 or > 5.5 mg/ml  Code Status: Full Code      Disposition Plan      Expected Discharge Date: 07/05/2022    Discharge Delays: Other (Add Comment)    Discharge Comments: GI status        The patient's care  was discussed with the Bedside Nurse and Patient.    Dell Mendoza MD  Hospitalist Service  [facility]  Securely message with the [employer] Web Console (learn more here)  Text page via [email or web address] Paging/Directory         Clinically Significant Risk Factors Present on Admission                     ______________________________________________________________________      Data reviewed today: I reviewed all medications, new labs and imaging results over the last 24 hours. I personally reviewed no images or EKG's today.    Physical Exam   Vital Signs: Temp: 98.4  F (36.9  C) Temp src: Oral BP: 90/53 Pulse: 73   Resp: 18 SpO2: 99 % O2 Device: None (Room air)    Weight: 123 lbs 9.6 oz       GENERAL: The patient is not in any acute distressed. Awake and alert.  HEENT: Nonicteric sclerae, PERRLA, EOMI. Oropharynx clear. Moist mucous membranes. Conjunctivae appear well perfused.  HEART: Regular rate and rhythm without murmurs.  LUNGS: Clear to auscultation bilaterally. No wheezing or crackles.  ABDOMEN: Soft, positive bowel sounds, nontender.  SKIN: No rash, no excessive bruising, petechiae, or purpura.  EXTREMITIES : no rashes, no swelling in legs.  NEUROLOGIC: conscious and oriented, follows commands, no obvious focal deficits.  ROS: All other systems negative     Data   Recent Labs   Lab 07/04/22  0532 07/03/22  1420 07/03/22  1220 07/03/22  0845 07/03/22  0604 07/03/22  0221 07/02/22  2101 07/02/22  1637 07/02/22  1118   WBC 2.7* 4.5  --   --   --  3.7*  --   --  4.1   HGB 7.1* 8.8*  --  9.1*  --  6.4*  --   --  7.6*   MCV 93 92  --   --   --  90  --   --  88   PLT 82* 109*  --   --   --  106*  --   --  143*   INR  --   --   --   --   --   --   --   --  1.29*   *  --   --   --   --  134*  --   --  136   POTASSIUM 3.6  --  4.1  --  3.2* 3.1*   < >  --  2.3*   CHLORIDE 103  --   --   --   --  103  --   --  101   CO2 26  --   --   --   --  25  --   --  21*   BUN 5*  --   --   --   --  6*  --   --   5*   CR 0.63  --   --   --   --  0.56*  --   --  0.62   ANIONGAP 6  --   --   --   --  6  --   --  14   CARLOS 7.8*  --   --   --   --  7.7*  --   --  7.9*     --   --   --   --  103  --  104* 95   ALBUMIN  --   --   --   --   --  2.1*  --   --  2.5*   PROTTOTAL  --   --   --   --   --  4.8*  --   --  5.7*   BILITOTAL  --   --   --   --   --  1.2*  --   --  1.9*   ALKPHOS  --   --   --   --   --  99  --   --  113   ALT  --   --   --   --   --  25  --   --  32   AST  --   --   --   --   --  61*  --   --  72*    < > = values in this interval not displayed.

## 2022-07-04 NOTE — PLAN OF CARE
Problem: Anemia  Goal: Anemia Symptom Improvement  Outcome: Ongoing, Progressing  Intervention: Monitor and Manage Anemia  Recent Flowsheet Documentation  Taken 7/4/2022 0922 by Helene Antony RN  Safety Promotion/Fall Prevention:   nonskid shoes/slippers when out of bed   clutter free environment maintained     Problem: Anemia  Goal: Anemia Symptom Improvement  Intervention: Monitor and Manage Anemia  Recent Flowsheet Documentation  Taken 7/4/2022 0922 by Helene Antony RN  Safety Promotion/Fall Prevention:   nonskid shoes/slippers when out of bed   clutter free environment maintained   Goal Outcome Evaluation:        Patient denies pain today. She is independent in room. Good appetite. HGB-7.1. Received 1 unit PRBC. Tele NSR.

## 2022-07-04 NOTE — PLAN OF CARE
"  Problem: Plan of Care - These are the overarching goals to be used throughout the patient stay.    Goal: Plan of Care Review/Shift Note  Description: The Plan of Care Review/Shift note should be completed every shift.  The Outcome Evaluation is a brief statement about your assessment that the patient is improving, declining, or no change.  This information will be displayed automatically on your shift note.  Outcome: Ongoing, Progressing  Flowsheets (Taken 7/4/2022 0507)  Plan of Care Reviewed With: patient  Overall Patient Progress: no change  Goal: Patient-Specific Goal (Individualized)  Description: You can add care plan individualizations to a care plan. Examples of Individualization might be:  \"Parent requests to be called daily at 9am for status\", \"I have a hard time hearing out of my right ear\", or \"Do not touch me to wake me up as it startles me\".  Outcome: Ongoing, Progressing  Goal: Absence of Hospital-Acquired Illness or Injury  Outcome: Ongoing, Progressing  Intervention: Identify and Manage Fall Risk  Recent Flowsheet Documentation  Taken 7/3/2022 2330 by Maye Damian RN  Safety Promotion/Fall Prevention:   fall prevention program maintained   nonskid shoes/slippers when out of bed   patient and family education  Intervention: Prevent Skin Injury  Recent Flowsheet Documentation  Taken 7/3/2022 2330 by Maye Damian RN  Body Position: position changed independently  Intervention: Prevent and Manage VTE (Venous Thromboembolism) Risk  Recent Flowsheet Documentation  Taken 7/3/2022 2330 by Maye Damian RN  Activity Management: up ad joao  Intervention: Prevent Infection  Recent Flowsheet Documentation  Taken 7/3/2022 2330 by Maye Damian RN  Infection Prevention: hand hygiene promoted  Goal: Optimal Comfort and Wellbeing  Outcome: Ongoing, Progressing  Goal: Readiness for Transition of Care  Outcome: Ongoing, Progressing     Problem: Pain Acute  Goal: Acceptable Pain Control and Functional " Ability  Outcome: Ongoing, Progressing  Intervention: Prevent or Manage Pain  Recent Flowsheet Documentation  Taken 7/3/2022 2330 by Maye Damian RN  Medication Review/Management: medications reviewed     Problem: Anemia  Goal: Anemia Symptom Improvement  Outcome: Ongoing, Progressing  Intervention: Monitor and Manage Anemia  Recent Flowsheet Documentation  Taken 7/3/2022 2330 by Maye Damian RN  Safety Promotion/Fall Prevention:   fall prevention program maintained   nonskid shoes/slippers when out of bed   patient and family education     Problem: Electrolyte Imbalance  Goal: Electrolyte Balance  Outcome: Ongoing, Progressing   Goal Outcome Evaluation:    Plan of Care Reviewed With: patient     Overall Patient Progress: no change         Patient alert and orientated. Denies pain or shortness of breath. BP continues to be low throughout shift. Pt states dizziness upon standing. Denies nausea. States feeling weak. Update MD during shift. Provided active listening. Encouraged patient to express internal feelings. Maye Damian RN on 7/4/2022 at 5:09 AM

## 2022-07-05 VITALS
SYSTOLIC BLOOD PRESSURE: 108 MMHG | HEART RATE: 95 BPM | BODY MASS INDEX: 22.74 KG/M2 | RESPIRATION RATE: 18 BRPM | TEMPERATURE: 98.3 F | OXYGEN SATURATION: 95 % | HEIGHT: 62 IN | WEIGHT: 123.6 LBS | DIASTOLIC BLOOD PRESSURE: 58 MMHG

## 2022-07-05 LAB
ANION GAP SERPL CALCULATED.3IONS-SCNC: 6 MMOL/L (ref 5–18)
BASOPHILS # BLD AUTO: 0 10E3/UL (ref 0–0.2)
BASOPHILS NFR BLD AUTO: 0 %
BUN SERPL-MCNC: 5 MG/DL (ref 8–22)
CALCIUM SERPL-MCNC: 7.9 MG/DL (ref 8.5–10.5)
CHLORIDE BLD-SCNC: 104 MMOL/L (ref 98–107)
CO2 SERPL-SCNC: 26 MMOL/L (ref 22–31)
CREAT SERPL-MCNC: 0.59 MG/DL (ref 0.6–1.1)
EOSINOPHIL # BLD AUTO: 0 10E3/UL (ref 0–0.7)
EOSINOPHIL NFR BLD AUTO: 1 %
ERYTHROCYTE [DISTWIDTH] IN BLOOD BY AUTOMATED COUNT: 17.3 % (ref 10–15)
GFR SERPL CREATININE-BSD FRML MDRD: >90 ML/MIN/1.73M2
GLUCOSE BLD-MCNC: 101 MG/DL (ref 70–125)
HCT VFR BLD AUTO: 27.9 % (ref 35–47)
HGB BLD-MCNC: 9.1 G/DL (ref 11.7–15.7)
IMM GRANULOCYTES # BLD: 0 10E3/UL
IMM GRANULOCYTES NFR BLD: 0 %
LYMPHOCYTES # BLD AUTO: 1 10E3/UL (ref 0.8–5.3)
LYMPHOCYTES NFR BLD AUTO: 31 %
MAGNESIUM SERPL-MCNC: 1.6 MG/DL (ref 1.8–2.6)
MCH RBC QN AUTO: 29 PG (ref 26.5–33)
MCHC RBC AUTO-ENTMCNC: 32.6 G/DL (ref 31.5–36.5)
MCV RBC AUTO: 89 FL (ref 78–100)
MONOCYTES # BLD AUTO: 0.3 10E3/UL (ref 0–1.3)
MONOCYTES NFR BLD AUTO: 10 %
NEUTROPHILS # BLD AUTO: 1.9 10E3/UL (ref 1.6–8.3)
NEUTROPHILS NFR BLD AUTO: 58 %
NRBC # BLD AUTO: 0 10E3/UL
NRBC BLD AUTO-RTO: 0 /100
PATH REPORT.COMMENTS IMP SPEC: NORMAL
PATH REPORT.COMMENTS IMP SPEC: NORMAL
PATH REPORT.FINAL DX SPEC: NORMAL
PATH REPORT.MICROSCOPIC SPEC OTHER STN: NORMAL
PATH REPORT.RELEVANT HX SPEC: NORMAL
PHOSPHATE SERPL-MCNC: 4.1 MG/DL (ref 2.5–4.5)
PLATELET # BLD AUTO: 83 10E3/UL (ref 150–450)
POTASSIUM BLD-SCNC: 3.6 MMOL/L (ref 3.5–5)
RBC # BLD AUTO: 3.14 10E6/UL (ref 3.8–5.2)
SODIUM SERPL-SCNC: 136 MMOL/L (ref 136–145)
WBC # BLD AUTO: 3.2 10E3/UL (ref 4–11)

## 2022-07-05 PROCEDURE — 85060 BLOOD SMEAR INTERPRETATION: CPT | Performed by: PATHOLOGY

## 2022-07-05 PROCEDURE — 36415 COLL VENOUS BLD VENIPUNCTURE: CPT | Performed by: INTERNAL MEDICINE

## 2022-07-05 PROCEDURE — 250N000013 HC RX MED GY IP 250 OP 250 PS 637: Performed by: INTERNAL MEDICINE

## 2022-07-05 PROCEDURE — 83735 ASSAY OF MAGNESIUM: CPT | Performed by: INTERNAL MEDICINE

## 2022-07-05 PROCEDURE — 99239 HOSP IP/OBS DSCHRG MGMT >30: CPT | Performed by: INTERNAL MEDICINE

## 2022-07-05 PROCEDURE — 84100 ASSAY OF PHOSPHORUS: CPT | Performed by: INTERNAL MEDICINE

## 2022-07-05 PROCEDURE — 85004 AUTOMATED DIFF WBC COUNT: CPT | Performed by: INTERNAL MEDICINE

## 2022-07-05 PROCEDURE — 80048 BASIC METABOLIC PNL TOTAL CA: CPT | Performed by: INTERNAL MEDICINE

## 2022-07-05 RX ORDER — SPIRONOLACTONE 50 MG/1
50 TABLET, FILM COATED ORAL DAILY
Qty: 30 TABLET | Refills: 1 | Status: ON HOLD | OUTPATIENT
Start: 2022-07-06 | End: 2023-01-01

## 2022-07-05 RX ORDER — VANCOMYCIN HYDROCHLORIDE 125 MG/1
125 CAPSULE ORAL 4 TIMES DAILY
Qty: 12 CAPSULE | Refills: 0 | Status: SHIPPED | OUTPATIENT
Start: 2022-07-05 | End: 2022-07-05

## 2022-07-05 RX ORDER — VANCOMYCIN HYDROCHLORIDE 125 MG/1
125 CAPSULE ORAL 4 TIMES DAILY
Qty: 12 CAPSULE | Refills: 0 | Status: SHIPPED | OUTPATIENT
Start: 2022-07-05 | End: 2022-07-08

## 2022-07-05 RX ORDER — FUROSEMIDE 20 MG
20 TABLET ORAL DAILY
Qty: 30 TABLET | Refills: 1 | Status: ON HOLD | OUTPATIENT
Start: 2022-07-06 | End: 2023-01-01

## 2022-07-05 RX ORDER — METOPROLOL SUCCINATE 25 MG/1
12.5 TABLET, EXTENDED RELEASE ORAL DAILY
Status: ON HOLD
Start: 2022-07-05 | End: 2023-01-01

## 2022-07-05 RX ORDER — PRAMOXINE HYDROCHLORIDE 10 MG/G
AEROSOL, FOAM TOPICAL 3 TIMES DAILY
Qty: 15 G | Refills: 1 | Status: ON HOLD | OUTPATIENT
Start: 2022-07-05 | End: 2023-01-01

## 2022-07-05 RX ADMIN — PANTOPRAZOLE SODIUM 40 MG: 40 TABLET, DELAYED RELEASE ORAL at 08:04

## 2022-07-05 RX ADMIN — SERTRALINE HYDROCHLORIDE 50 MG: 50 TABLET ORAL at 08:04

## 2022-07-05 RX ADMIN — METOPROLOL SUCCINATE 12.5 MG: 25 TABLET, EXTENDED RELEASE ORAL at 08:03

## 2022-07-05 RX ADMIN — SPIRONOLACTONE 50 MG: 25 TABLET, FILM COATED ORAL at 08:04

## 2022-07-05 RX ADMIN — VANCOMYCIN HYDROCHLORIDE 125 MG: 125 CAPSULE ORAL at 08:03

## 2022-07-05 RX ADMIN — VANCOMYCIN HYDROCHLORIDE 125 MG: 125 CAPSULE ORAL at 12:31

## 2022-07-05 RX ADMIN — FUROSEMIDE 20 MG: 20 TABLET ORAL at 08:03

## 2022-07-05 RX ADMIN — PRAMOXINE HYDROCHLORIDE: 10 AEROSOL, FOAM TOPICAL at 08:01

## 2022-07-05 RX ADMIN — PRAMOXINE HYDROCHLORIDE: 10 AEROSOL, FOAM TOPICAL at 14:02

## 2022-07-05 ASSESSMENT — ACTIVITIES OF DAILY LIVING (ADL)
ADLS_ACUITY_SCORE: 18

## 2022-07-05 NOTE — PLAN OF CARE
Goal Outcome Evaluation:       0303-5004... Pt is a/o x4, independent at baseline. No c/o pain, post transfusion status is stable. On tele=NSR.

## 2022-07-05 NOTE — PLAN OF CARE
Problem: Plan of Care - These are the overarching goals to be used throughout the patient stay.    Goal: Optimal Comfort and Wellbeing  Outcome: Ongoing, Progressing   Goal Outcome Evaluation:  Pt is alert and oriented. Able to make needs known. Pt is up independently to the bathroom. Pt denies any pain or shortness of breath. O2 97% on room air. Tele is NSR. Uneventful shift.  Elizabeth Wiseman RN

## 2022-07-05 NOTE — PLAN OF CARE
Denies pain; VSS; had one formed BM today.  Abdomen still moderately distended.  Pt feels she is at her baseline.  Discharging to home today per 's transportation.

## 2022-07-05 NOTE — PROGRESS NOTES
"GI PROGRESS NOTE  7/5/2022  Lori Jean-Baptiste  1965  /-88    Subjective:   Feeling well today. No complaints. Very hungry, which is new and a pleasant surprise for her.      Objective:     Blood pressure 108/58, pulse 95, temperature 98.3  F (36.8  C), temperature source Oral, resp. rate 18, height 1.575 m (5' 2\"), weight 56.1 kg (123 lb 9.6 oz), SpO2 95 %.    Body mass index is 22.61 kg/m .  Gen: NAD  Cardio: RRR  GI: Non-distended, BS positive, soft, non-tender  Neuro: Alert and orientated x3   Musculoskeletal: No edema.   Skin: No jaundice.     Laboratory:  BMP  Recent Labs   Lab 07/05/22  0638 07/04/22  0532 07/03/22  1220 07/03/22  0604 07/03/22  0221    135*  --   --  134*   POTASSIUM 3.6 3.6 4.1   < > 3.1*   CHLORIDE 104 103  --   --  103   CARLOS 7.9* 7.8*  --   --  7.7*   CO2 26 26  --   --  25   BUN 5* 5*  --   --  6*   CR 0.59* 0.63  --   --  0.56*    107  --   --  103    < > = values in this interval not displayed.     CBC  Recent Labs   Lab 07/05/22  0638 07/04/22  0532 07/03/22  1420   WBC 3.2* 2.7* 4.5   RBC 3.14* 2.49* 3.04*   HGB 9.1* 7.1* 8.8*   HCT 27.9* 23.1* 28.0*   MCV 89 93 92   MCH 29.0 28.5 28.9   MCHC 32.6 30.7* 31.4*   RDW 17.3* 17.3* 17.1*   PLT 83* 82* 109*     INR  Recent Labs   Lab 07/02/22  1118   INR 1.29*      LFTs  Recent Labs   Lab Test 07/03/22  0221 07/02/22  1151 07/02/22  1118 06/29/22  1118 12/29/21  1614 02/08/18  0910 01/25/18  0827   ALBUMIN 2.1*  --  2.5* 2.9* 3.4*   < > 3.5   BILITOTAL 1.2*  --  1.9* 1.3* 1.2*   < > 2.1*   ALT 25  --  32 39* 30   < > 18   AST 61*  --  72* 63* 84*   < > 46*   PROTEIN  --  Negative  --   --   --   --   --    LIPASE  --   --   --   --  20  --  26    < > = values in this interval not displayed.     Imaging:  EXAM: US ABDOMEN COMPLETE WITH DOPPLER COMPLETE  LOCATION: [facility]  DATE/TIME: 7/3/2022 6:17 PM     INDICATION: Cirrhosis, ascites, assess hepatic and portal vasculature as " well.  COMPARISON: None.  TECHNIQUE: Complete abdominal ultrasound. Color flow with spectral Doppler and waveform analysis performed.     FINDINGS:  GALLBLADDER: Cholecystectomy.   BILE DUCTS: No biliary dilatation. The common duct measures 6 mm.  LIVER: Coarsened echotexture, suggesting underlying fibrosis. Nodular contour. No focal solid mass.   RIGHT KIDNEY: Normal size. Normal echogenicity with no hydronephrosis or mass.   LEFT KIDNEY: Normal size. No hydronephrosis.  SPLEEN: Normal measuring at the upper limits of normal in terms of size, 12.3 cm.  PANCREAS: The visualized portions are normal.  AORTA: Normal in caliber.  IVC: Normal where visualized.  No ascites.     ABDOMINAL DUPLEX: The hepatic artery, hepatic veins, IVC, portal veins, and splenic vein are patent with flow in the normal direction.      VESSEL/VELOCITY (cm/s)  Main Portal Vein: 11-32   Left Portal Vein: 27   Right Portal Vein: 22   Hep Vein L: 19   Hep Vein M: 20   Hep Vein R: 79   Hepatic Artery: 68   Splenic Vein: 44      Portal Vein Diameter: 1.4 cm                                              IMPRESSION:  1.  No acute abnormality.  2.  Cirrhotic appearance of the liver. The portal vein is patent with antegrade flow.  3.  Borderline splenomegaly raising the possibility for portal venous hypertension.  4.  Moderate amount of intra-abdominal ascites.    1. PARACENTESIS  2. ULTRASOUND GUIDANCE  LOCATION: [facility]  DATE/TIME: 7/2/2022 10:28 AM     INDICATION: Ascites.     PROCEDURE: Informed consent obtained. Time out performed. The abdomen was prepped and draped in a sterile fashion. 9  mL of 1% lidocaine was infused into local soft tissues. A 5 [place] catheter system was introduced into the abdominal ascites under   ultrasound guidance.     3.55  liters of clear fluid were removed and sent to lab if requested.     Patient tolerated procedure well.     Ultrasound imaging was obtained and placed in the patient's  "permanent medical record.                                              IMPRESSION:  1.  Status post ultrasound-guided paracentesis.        Assessment:   1. Cirrhosis  56 year-old female with cirrhosis 2/2 alcohol, also with hx of HCV, with reported sobriety since 12/2021. MELD 10 on admission. Complicated by ascites and esophageal varices.   A. Ascites  S/p paracentesis 7/2/22 with 3.55L removed. Negative for SBP. On spironolactone 50mg and furosemide 20mg daily. Creatinine 0.59. She admits to \"cheating\" on her low sodium diet at home. She is committed to watching her diet more carefully.   B. Esophageal varices  Last EGD 5/2022, small without bleeding. No signs overt GI bleeding.    2. Candida esophagitis  Candida esophagitis seen on last EGD 5/20/22, treated with fluconazole. Plan for repeat EGD 8/26/22.      Plan:   1. Continue furosemide 20mg once daily and spironolactone 50mg daily.   2. Low sodium diet (<2000mg NA daily)  3. Follow-up with primary within the next week for CMP.   4. Follow-up [facility] liver clinic, our office will arrange. I will also have our hepatology RNs check in on her weekly to assess for need for repeat paracentesis.   5. Keep EGD appt 8/26/22.     Ok from our standpoint for discharge home today.   We will sign off. Please call with questions.                                                                          Roseann Veras PA-C  Thank you for the opportunity to participate in the care of this patient.   Please feel free to call me with any questions or concerns.  Phone number [phone].            "

## 2022-07-06 ENCOUNTER — PATIENT OUTREACH (OUTPATIENT)
Dept: CARE COORDINATION | Facility: CLINIC | Age: 57
End: 2022-07-06

## 2022-07-06 DIAGNOSIS — Z71.89 OTHER SPECIFIED COUNSELING: ICD-10-CM

## 2022-07-06 LAB
HAPTOGLOB SERPL-MCNC: 71 MG/DL (ref 32–197)
PLPETH BLD-MCNC: 189 NG/ML
POPETH BLD-MCNC: 213 NG/ML

## 2022-07-06 NOTE — PROGRESS NOTES
Clinic Care Coordination Contact  [facility]/Voicemail       Clinical Data: Care Coordinator Outreach  Outreach attempted x 1. Phone kept ringing. Unable to leave message on patient's voicemail with call back information and requested return call.  Plan: Care Coordinator will try to reach patient again in 1-2 business days.        СВЕТЛАНА Lopez  [phone]  [facility]

## 2022-07-07 LAB — BACTERIA FLD CULT: NO GROWTH

## 2022-07-07 NOTE — PROGRESS NOTES
Clinic Care Coordination Contact  [facility]/Voicemail       Clinical Data: Care Coordinator Outreach  Outreach attempted x 2.  Left message on patient's voicemail with call back information and requested return call.  Plan: Care Coordinator will do no further outreaches at this time.        СВЕТЛАНА Lopez  [phone]  [facility]

## 2022-07-08 LAB
BACTERIA BLD CULT: NO GROWTH
BACTERIA BLD CULT: NO GROWTH

## 2022-07-11 NOTE — DISCHARGE SUMMARY
[facility]  Hospitalist Discharge Summary      Date of Admission:  7/2/2022  Date of Discharge:  7/5/2022  2:43 PM  Discharging Provider: Dell Mendoza MD  Discharge Service: Hospitalist Service    Discharge Diagnoses         Lori Jean-Baptiste is a 56 year old female admitted on 7/2/2022. She has a h/o alcoholic cirrhosis and complications.      Recent hospitalization at [facility] 3-4 days back for GI bleed and found to havepeptic ulcer and received 4 units of blood, hb is in low 7's but no h/o rectal bleeding, black stools or hematemesis.     She has been admitted with abdominal distension and sob due to abdominal distension. She had significant ascites related to her CLD and underwent paracentesis in ER and 4 liters of fluid was aspirated and send for analysis. Sob has improved post paracentesis. She also has significant anasarca and has not been  on diuretics in past. Patient got 40 mg of iv lasix 1 dose in ER and  will hold off further diuresis at this time considering 4 liters of fluids has been removed during paracentesis. Will consult GI. Patient has a h/o chronic alcohol dependence but per her information she has been sober since 12/2021                A/p :              Abdominal distension     Ascites 2/2 CLD    SOFT BP    H/o  Cirrhosis 2/2 alcohol and hepatitis C - per information from patient    Recent hospitalization at [facility] 3-4 days back for GI bleed and found to havepeptic ulcer and received 4 units of blood, hb is in low 7's but no h/o rectal bleeding, black stools or hematemesis.     She has been admitted with abdominal distension and sob due to abdominal distension.   She had significant ascites related to her CLD and underwent paracentesis in ER   4 liters of fluid was aspirated and sent for analysis : no evidence for SBP.  Sob has improved post paracentesis.   She also has significant anasarca and has not been  on diuretics in past.   Patient got 40 mg of iv lasix 1  dose in ER and started on lasix and spironolactone, reduced metoprolol dose and stopped clonidine due to soft BP   Given 1 dose of iv albumin for muscle cramps  GI consulted and plan for outpatient f/u, continue PPI bid  Patient has a h/o chronic alcohol dependence but per her information she has been sober since 12/2021              Tachycardia, Sinus : no convincing evidence of sepsis, monitor, stable.       HTN, Essential : on metoprolol 25 mg daily, clonidine 0.1 mg at bedtime - at home.       Anxiety/Depression : on zoloft 50 mg daily, ambien 5 mg at bedtime prn -at home.       Recent C diff at [facility] : on oral vancomycin 125 mg qid for 12 days, will complete treatment       Chronic alcohol dependence : sober since 12/2021       Hypokalemia : supplement prn       Hyponatremia : related to chronic liver disease       Pancytopenia 2/2 liver disease , monitor outpatient       Metabolic acidosis : montior.       Hypomagnesemia : supplement prn       Anemia : Hb at 7.6 : runs around 7's -8's, no h/o GI bleed today, monitor, transfuse prn to keep hb > 7    Hb dropped in low 7's on 7/3, s/p 1 unit prbc, stable  No h/o blood in stools or black stools  Outpatient f/u                   Follow-ups Needed After Discharge   Follow-up Appointments     Follow-up and recommended labs and tests       Follow up with primary care provider, Mehnaz Gaspar MD, within 7   days for hospital follow- up.  The following labs/tests are recommended:   BMP.    Follow up with [facility] as advised   Keep EGD appt 8/26/22.         {Additional follow-up instructions/to-do's for PCP    : bmp, cbc    Unresulted Labs Ordered in the Past 30 Days of this Admission     No orders found from 6/2/2022 to 7/3/2022.      These results will be followed up by pcp    Discharge Disposition   Discharged to home  Condition at discharge: Stable      Consultations This Hospital Stay   CARE MANAGEMENT / SOCIAL WORK IP CONSULT  GASTROENTEROLOGY IP  CONSULT    Code Status   Prior    Time Spent on this Encounter   I, Dell Mendoza MD, personally saw the patient today and spent greater than 30 minutes discharging this patient.       Dell Mendoza MD  [address] [address]  Phone: [phone]  Fax: [phone]  ______________________________________________________________________    Physical Exam   Vital Signs:                    Weight: 123 lbs 9.6 oz       GENERAL: The patient is not in any acute distressed. Awake and alert.  HEENT: Nonicteric sclerae, PERRLA, EOMI. Oropharynx clear. Moist mucous membranes. Conjunctivae appear well perfused.  HEART: Regular rate and rhythm without murmurs.  LUNGS: Clear to auscultation bilaterally. No wheezing or crackles.  ABDOMEN: Soft, positive bowel sounds, nontender.  SKIN: No rash, no excessive bruising, petechiae, or purpura.  EXTREMITIES : no rashes, no swelling in legs.  NEUROLOGIC: conscious and oriented, follows commands, no obvious focal deficits.  ROS: All other systems negative          Primary Care Physician   Mehnaz Gaspar MD    Discharge Orders      **Comprehensive metabolic panel FUTURE 2mo     Reason for your hospital stay    ascites     Follow-up and recommended labs and tests     Follow up with primary care provider, Mehnaz Gaspar MD, within 7 days for hospital follow- up.  The following labs/tests are recommended: BMP.    Follow up with [facility] as advised   Keep EGD appt 8/26/22.     Activity    Your activity upon discharge: activity as tolerated     Diet    Follow this diet upon discharge: Orders Placed This Encounter      2 Gram Sodium Diet       Significant Results and Procedures   Most Recent 3 CBC's:Recent Labs   Lab Test 07/05/22  0638 07/04/22  0532 07/03/22  1420   WBC 3.2* 2.7* 4.5   HGB 9.1* 7.1* 8.8*   MCV 89 93 92   PLT 83* 82* 109*     Most Recent 3 BMP's:Recent Labs   Lab Test 07/05/22  0638 07/04/22  0532  07/03/22  1220 07/03/22  0604 07/03/22  0221    135*  --   --  134*   POTASSIUM 3.6 3.6 4.1   < > 3.1*   CHLORIDE 104 103  --   --  103   CO2 26 26  --   --  25   BUN 5* 5*  --   --  6*   CR 0.59* 0.63  --   --  0.56*   ANIONGAP 6 6  --   --  6   CARLOS 7.9* 7.8*  --   --  7.7*    107  --   --  103    < > = values in this interval not displayed.     Most Recent 2 LFT's:Recent Labs   Lab Test 07/03/22  0221 07/02/22  1118   AST 61* 72*   ALT 25 32   ALKPHOS 99 113   BILITOTAL 1.2* 1.9*     Most Recent 3 INR's:Recent Labs   Lab Test 07/02/22  1118 02/08/18  1718 02/08/18  0910   INR 1.29* 1.37* 1.39*       Discharge Medications   Discharge Medication List as of 7/5/2022  2:26 PM      START taking these medications    Details   furosemide (LASIX) 20 MG tablet Take 1 tablet (20 mg) by mouth daily, Disp-30 tablet, R-1, E-Prescribe      pramoxine (PROCTOFOAM) 1 % foam Place rectally 3 times dailyDisp-15 g, [id number]      spironolactone (ALDACTONE) 50 MG tablet Take 1 tablet (50 mg) by mouth daily, Disp-30 tablet, R-1, E-Prescribe         CONTINUE these medications which have CHANGED    Details   metoprolol succinate ER (TOPROL XL) 25 MG 24 hr tablet Take 0.5 tablets (12.5 mg) by mouth daily, No Print Out      vancomycin (VANCOCIN) 125 MG capsule Take 1 capsule (125 mg) by mouth 4 times daily for 3 days, Disp-12 capsule, R-0, E-Prescribe         CONTINUE these medications which have NOT CHANGED    Details   pantoprazole (PROTONIX) 40 MG EC tablet Take 40 mg by mouth 2 times daily, Historical      cloNIDine (CATAPRES) 0.1 MG tablet Take 0.1 mg by mouth At Bedtime, Historical      sertraline (ZOLOFT) 50 MG tablet Take 50 mg by mouth daily, Historical      zolpidem (AMBIEN) 5 MG tablet Take 5 mg by mouth nightly as needed, Historical           Allergies   No Known Allergies

## 2022-10-19 ENCOUNTER — LAB REQUISITION (OUTPATIENT)
Dept: LAB | Facility: CLINIC | Age: 57
End: 2022-10-19
Payer: MEDICARE

## 2022-10-19 DIAGNOSIS — I10 ESSENTIAL (PRIMARY) HYPERTENSION: ICD-10-CM

## 2022-10-19 DIAGNOSIS — Z01.419 ENCOUNTER FOR GYNECOLOGICAL EXAMINATION (GENERAL) (ROUTINE) WITHOUT ABNORMAL FINDINGS: ICD-10-CM

## 2022-10-19 DIAGNOSIS — Z13.220 ENCOUNTER FOR SCREENING FOR LIPOID DISORDERS: ICD-10-CM

## 2022-10-19 LAB
ALBUMIN SERPL BCG-MCNC: 4.1 G/DL (ref 3.5–5.2)
ALP SERPL-CCNC: 163 U/L (ref 35–104)
ALT SERPL W P-5'-P-CCNC: 37 U/L (ref 10–35)
ANION GAP SERPL CALCULATED.3IONS-SCNC: 9 MMOL/L (ref 7–15)
AST SERPL W P-5'-P-CCNC: 69 U/L (ref 10–35)
BILIRUB SERPL-MCNC: 0.6 MG/DL
BUN SERPL-MCNC: 8.8 MG/DL (ref 6–20)
CALCIUM SERPL-MCNC: 9.4 MG/DL (ref 8.6–10)
CHLORIDE SERPL-SCNC: 101 MMOL/L (ref 98–107)
CHOLEST SERPL-MCNC: 153 MG/DL
CREAT SERPL-MCNC: 0.57 MG/DL (ref 0.51–0.95)
DEPRECATED HCO3 PLAS-SCNC: 26 MMOL/L (ref 22–29)
GFR SERPL CREATININE-BSD FRML MDRD: >90 ML/MIN/1.73M2
GLUCOSE SERPL-MCNC: 104 MG/DL (ref 70–99)
HDLC SERPL-MCNC: 82 MG/DL
LDLC SERPL CALC-MCNC: 63 MG/DL
NONHDLC SERPL-MCNC: 71 MG/DL
POTASSIUM SERPL-SCNC: 5 MMOL/L (ref 3.4–5.3)
PROT SERPL-MCNC: 7.3 G/DL (ref 6.4–8.3)
SODIUM SERPL-SCNC: 136 MMOL/L (ref 136–145)
TRIGL SERPL-MCNC: 41 MG/DL

## 2022-10-19 PROCEDURE — G0145 SCR C/V CYTO,THINLAYER,RESCR: HCPCS | Mod: ORL | Performed by: PHYSICIAN ASSISTANT

## 2022-10-19 PROCEDURE — 87624 HPV HI-RISK TYP POOLED RSLT: CPT | Mod: ORL | Performed by: PHYSICIAN ASSISTANT

## 2022-10-19 PROCEDURE — 80053 COMPREHEN METABOLIC PANEL: CPT | Mod: ORL | Performed by: PHYSICIAN ASSISTANT

## 2022-10-19 PROCEDURE — 80061 LIPID PANEL: CPT | Mod: ORL | Performed by: PHYSICIAN ASSISTANT

## 2022-10-21 LAB
BKR LAB AP GYN ADEQUACY: NORMAL
BKR LAB AP GYN INTERPRETATION: NORMAL
BKR LAB AP HPV REFLEX: NORMAL
BKR LAB AP LMP: NORMAL
BKR LAB AP PREVIOUS ABNORMAL: NORMAL
PATH REPORT.COMMENTS IMP SPEC: NORMAL
PATH REPORT.COMMENTS IMP SPEC: NORMAL
PATH REPORT.RELEVANT HX SPEC: NORMAL

## 2022-10-25 LAB
HUMAN PAPILLOMA VIRUS 16 DNA: NEGATIVE
HUMAN PAPILLOMA VIRUS 18 DNA: NEGATIVE
HUMAN PAPILLOMA VIRUS FINAL DIAGNOSIS: NORMAL
HUMAN PAPILLOMA VIRUS OTHER HR: NEGATIVE

## 2023-01-01 ENCOUNTER — APPOINTMENT (OUTPATIENT)
Dept: ULTRASOUND IMAGING | Facility: CLINIC | Age: 58
End: 2023-01-01
Attending: EMERGENCY MEDICINE
Payer: MEDICARE

## 2023-01-01 ENCOUNTER — HOSPITAL ENCOUNTER (OUTPATIENT)
Dept: ULTRASOUND IMAGING | Facility: HOSPITAL | Age: 58
Discharge: HOME | End: 2023-04-04
Attending: INTERNAL MEDICINE | Admitting: RADIOLOGY
Payer: MEDICARE

## 2023-01-01 ENCOUNTER — HOSPITAL ENCOUNTER (OUTPATIENT)
Dept: ULTRASOUND IMAGING | Facility: HOSPITAL | Age: 58
Discharge: HOME | End: 2023-02-24
Attending: INTERNAL MEDICINE | Admitting: INTERNAL MEDICINE
Payer: MEDICARE

## 2023-01-01 ENCOUNTER — HOSPITAL ENCOUNTER (OUTPATIENT)
Facility: CLINIC | Age: 58
Discharge: HOME | End: 2023-08-30
Admitting: RADIOLOGY
Payer: MEDICARE

## 2023-01-01 ENCOUNTER — APPOINTMENT (OUTPATIENT)
Dept: INTERVENTIONAL RADIOLOGY/VASCULAR | Facility: CLINIC | Age: 58
DRG: 441 | End: 2023-01-01
Attending: STUDENT IN AN ORGANIZED HEALTH CARE EDUCATION/TRAINING PROGRAM
Payer: MEDICARE

## 2023-01-01 ENCOUNTER — HOSPITAL ENCOUNTER (OUTPATIENT)
Dept: CARDIOLOGY | Facility: CLINIC | Age: 58
Discharge: HOME | End: 2023-09-21
Attending: INTERNAL MEDICINE | Admitting: INTERNAL MEDICINE
Payer: MEDICARE

## 2023-01-01 ENCOUNTER — HOSPITAL ENCOUNTER (OUTPATIENT)
Dept: ULTRASOUND IMAGING | Facility: CLINIC | Age: 58
Discharge: HOME | End: 2023-10-06
Attending: INTERNAL MEDICINE | Admitting: INTERNAL MEDICINE
Payer: MEDICARE

## 2023-01-01 ENCOUNTER — LAB REQUISITION (OUTPATIENT)
Dept: LAB | Facility: CLINIC | Age: 58
End: 2023-01-01
Payer: MEDICARE

## 2023-01-01 ENCOUNTER — APPOINTMENT (OUTPATIENT)
Dept: GENERAL RADIOLOGY | Facility: CLINIC | Age: 58
DRG: 441 | End: 2023-01-01
Attending: EMERGENCY MEDICINE
Payer: MEDICARE

## 2023-01-01 ENCOUNTER — HOSPITAL ENCOUNTER (EMERGENCY)
Facility: CLINIC | Age: 58
Discharge: HOME | End: 2023-11-14
Attending: EMERGENCY MEDICINE | Admitting: EMERGENCY MEDICINE
Payer: MEDICARE

## 2023-01-01 ENCOUNTER — PATIENT OUTREACH (OUTPATIENT)
Dept: CARE COORDINATION | Facility: CLINIC | Age: 58
End: 2023-01-01
Payer: MEDICARE

## 2023-01-01 ENCOUNTER — HOSPITAL ENCOUNTER (OUTPATIENT)
Dept: ULTRASOUND IMAGING | Facility: CLINIC | Age: 58
Discharge: HOME | End: 2023-08-30
Attending: INTERNAL MEDICINE
Payer: MEDICARE

## 2023-01-01 ENCOUNTER — HOSPITAL ENCOUNTER (OUTPATIENT)
Dept: ULTRASOUND IMAGING | Facility: HOSPITAL | Age: 58
Discharge: HOME | End: 2023-04-20
Attending: INTERNAL MEDICINE | Admitting: RADIOLOGY
Payer: MEDICARE

## 2023-01-01 ENCOUNTER — HOSPITAL ENCOUNTER (OUTPATIENT)
Dept: ULTRASOUND IMAGING | Facility: HOSPITAL | Age: 58
Discharge: HOME | End: 2023-03-27
Attending: INTERNAL MEDICINE | Admitting: RADIOLOGY
Payer: MEDICARE

## 2023-01-01 ENCOUNTER — HOSPITAL ENCOUNTER (OUTPATIENT)
Dept: ULTRASOUND IMAGING | Facility: CLINIC | Age: 58
Discharge: HOME | End: 2023-09-19
Attending: INTERNAL MEDICINE
Payer: MEDICARE

## 2023-01-01 ENCOUNTER — HOSPITAL ENCOUNTER (OUTPATIENT)
Facility: CLINIC | Age: 58
Discharge: HOME | End: 2023-10-24
Admitting: RADIOLOGY
Payer: MEDICARE

## 2023-01-01 ENCOUNTER — APPOINTMENT (OUTPATIENT)
Dept: CT IMAGING | Facility: CLINIC | Age: 58
DRG: 441 | End: 2023-01-01
Attending: EMERGENCY MEDICINE
Payer: MEDICARE

## 2023-01-01 ENCOUNTER — HOSPITAL ENCOUNTER (OUTPATIENT)
Dept: ULTRASOUND IMAGING | Facility: CLINIC | Age: 58
Discharge: HOME | DRG: 441 | End: 2023-12-06
Attending: INTERNAL MEDICINE | Admitting: INTERNAL MEDICINE
Payer: MEDICARE

## 2023-01-01 ENCOUNTER — APPOINTMENT (OUTPATIENT)
Dept: MRI IMAGING | Facility: CLINIC | Age: 58
DRG: 441 | End: 2023-01-01
Attending: STUDENT IN AN ORGANIZED HEALTH CARE EDUCATION/TRAINING PROGRAM
Payer: MEDICARE

## 2023-01-01 ENCOUNTER — HOSPITAL ENCOUNTER (EMERGENCY)
Facility: CLINIC | Age: 58
Discharge: HOME | End: 2023-09-28
Attending: PHYSICIAN ASSISTANT | Admitting: PHYSICIAN ASSISTANT
Payer: MEDICARE

## 2023-01-01 ENCOUNTER — TELEPHONE (OUTPATIENT)
Dept: MEDSURG UNIT | Facility: CLINIC | Age: 58
End: 2023-01-01
Payer: MEDICARE

## 2023-01-01 ENCOUNTER — HOSPITAL ENCOUNTER (OUTPATIENT)
Dept: ULTRASOUND IMAGING | Facility: HOSPITAL | Age: 58
Discharge: HOME | End: 2023-07-17
Attending: INTERNAL MEDICINE | Admitting: INTERNAL MEDICINE
Payer: MEDICARE

## 2023-01-01 ENCOUNTER — HOSPITAL ENCOUNTER (OUTPATIENT)
Dept: ULTRASOUND IMAGING | Facility: CLINIC | Age: 58
Discharge: HOME | End: 2023-09-07
Attending: INTERNAL MEDICINE
Payer: MEDICARE

## 2023-01-01 ENCOUNTER — HOSPITAL ENCOUNTER (OUTPATIENT)
Dept: CT IMAGING | Facility: CLINIC | Age: 58
Discharge: HOME | End: 2023-09-29
Attending: RADIOLOGY | Admitting: RADIOLOGY
Payer: MEDICARE

## 2023-01-01 ENCOUNTER — HOSPITAL ENCOUNTER (OUTPATIENT)
Dept: ULTRASOUND IMAGING | Facility: CLINIC | Age: 58
Discharge: HOME | End: 2023-10-24
Attending: INTERNAL MEDICINE
Payer: MEDICARE

## 2023-01-01 ENCOUNTER — HOSPITAL ENCOUNTER (OUTPATIENT)
Facility: CLINIC | Age: 58
Discharge: HOME | End: 2023-11-27
Admitting: INTERNAL MEDICINE
Payer: MEDICARE

## 2023-01-01 ENCOUNTER — HOSPITAL ENCOUNTER (OUTPATIENT)
Dept: ULTRASOUND IMAGING | Facility: HOSPITAL | Age: 58
Discharge: HOME | End: 2023-08-23
Attending: INTERNAL MEDICINE | Admitting: RADIOLOGY
Payer: MEDICARE

## 2023-01-01 ENCOUNTER — HOSPITAL ENCOUNTER (OUTPATIENT)
Dept: ULTRASOUND IMAGING | Facility: HOSPITAL | Age: 58
Discharge: HOME | End: 2023-08-07
Attending: INTERNAL MEDICINE
Payer: MEDICARE

## 2023-01-01 ENCOUNTER — APPOINTMENT (OUTPATIENT)
Dept: ULTRASOUND IMAGING | Facility: CLINIC | Age: 58
End: 2023-01-01
Attending: PHYSICIAN ASSISTANT
Payer: MEDICARE

## 2023-01-01 ENCOUNTER — HOSPITAL ENCOUNTER (OUTPATIENT)
Facility: CLINIC | Age: 58
Discharge: HOME | End: 2023-09-14
Admitting: STUDENT IN AN ORGANIZED HEALTH CARE EDUCATION/TRAINING PROGRAM
Payer: MEDICARE

## 2023-01-01 ENCOUNTER — HOSPITAL ENCOUNTER (OUTPATIENT)
Dept: ULTRASOUND IMAGING | Facility: HOSPITAL | Age: 58
Discharge: HOME | End: 2023-05-09
Attending: INTERNAL MEDICINE | Admitting: RADIOLOGY
Payer: MEDICARE

## 2023-01-01 ENCOUNTER — HOSPITAL ENCOUNTER (OUTPATIENT)
Dept: ULTRASOUND IMAGING | Facility: CLINIC | Age: 58
Discharge: HOME | End: 2023-09-14
Attending: INTERNAL MEDICINE
Payer: MEDICARE

## 2023-01-01 ENCOUNTER — HOSPITAL ENCOUNTER (OUTPATIENT)
Dept: ULTRASOUND IMAGING | Facility: CLINIC | Age: 58
Discharge: HOME | End: 2023-08-25
Attending: INTERNAL MEDICINE | Admitting: INTERNAL MEDICINE
Payer: MEDICARE

## 2023-01-01 ENCOUNTER — HOSPITAL ENCOUNTER (OUTPATIENT)
Dept: ULTRASOUND IMAGING | Facility: HOSPITAL | Age: 58
Discharge: HOME | End: 2023-01-02
Attending: INTERNAL MEDICINE | Admitting: RADIOLOGY
Payer: MEDICARE

## 2023-01-01 ENCOUNTER — HOSPITAL ENCOUNTER (OUTPATIENT)
Dept: ULTRASOUND IMAGING | Facility: CLINIC | Age: 58
End: 2023-01-01
Attending: INTERNAL MEDICINE
Payer: MEDICARE

## 2023-01-01 ENCOUNTER — HOSPITAL ENCOUNTER (OUTPATIENT)
Facility: CLINIC | Age: 58
Discharge: HOME | End: 2023-09-19
Admitting: RADIOLOGY
Payer: MEDICARE

## 2023-01-01 ENCOUNTER — HOSPITAL ENCOUNTER (OUTPATIENT)
Facility: CLINIC | Age: 58
Discharge: HOME | End: 2023-10-30
Admitting: RADIOLOGY
Payer: MEDICARE

## 2023-01-01 ENCOUNTER — HOSPITAL ENCOUNTER (OUTPATIENT)
Dept: ULTRASOUND IMAGING | Facility: HOSPITAL | Age: 58
Discharge: HOME | End: 2023-02-08
Attending: INTERNAL MEDICINE | Admitting: RADIOLOGY
Payer: MEDICARE

## 2023-01-01 ENCOUNTER — HOSPITAL ENCOUNTER (OUTPATIENT)
Dept: ULTRASOUND IMAGING | Facility: HOSPITAL | Age: 58
Discharge: HOME | End: 2023-01-26
Attending: INTERNAL MEDICINE | Admitting: RADIOLOGY
Payer: MEDICARE

## 2023-01-01 ENCOUNTER — HOSPITAL ENCOUNTER (OUTPATIENT)
Dept: ULTRASOUND IMAGING | Facility: CLINIC | Age: 58
Discharge: HOME | End: 2023-11-27
Attending: INTERNAL MEDICINE
Payer: MEDICARE

## 2023-01-01 ENCOUNTER — HOSPITAL ENCOUNTER (OUTPATIENT)
Dept: ULTRASOUND IMAGING | Facility: CLINIC | Age: 58
Discharge: HOME | End: 2023-10-18
Attending: INTERNAL MEDICINE | Admitting: INTERNAL MEDICINE
Payer: MEDICARE

## 2023-01-01 ENCOUNTER — HOSPITAL ENCOUNTER (OUTPATIENT)
Dept: ULTRASOUND IMAGING | Facility: CLINIC | Age: 58
Discharge: HOME | End: 2023-08-03
Attending: INTERNAL MEDICINE | Admitting: INTERNAL MEDICINE
Payer: MEDICARE

## 2023-01-01 ENCOUNTER — HOSPITAL ENCOUNTER (OUTPATIENT)
Dept: ULTRASOUND IMAGING | Facility: HOSPITAL | Age: 58
Discharge: HOME | End: 2023-05-25
Attending: INTERNAL MEDICINE | Admitting: RADIOLOGY
Payer: MEDICARE

## 2023-01-01 ENCOUNTER — HOSPITAL ENCOUNTER (OUTPATIENT)
Facility: CLINIC | Age: 58
Discharge: HOME | End: 2023-09-07
Admitting: RADIOLOGY
Payer: MEDICARE

## 2023-01-01 ENCOUNTER — HOSPITAL ENCOUNTER (OUTPATIENT)
Dept: ULTRASOUND IMAGING | Facility: HOSPITAL | Age: 58
Discharge: HOME | End: 2023-05-31
Attending: INTERNAL MEDICINE | Admitting: RADIOLOGY
Payer: MEDICARE

## 2023-01-01 ENCOUNTER — LAB (OUTPATIENT)
Dept: LAB | Facility: HOSPITAL | Age: 58
End: 2023-01-01
Attending: INTERNAL MEDICINE
Payer: MEDICARE

## 2023-01-01 ENCOUNTER — MEDICAL CORRESPONDENCE (OUTPATIENT)
Dept: HEALTH INFORMATION MANAGEMENT | Facility: CLINIC | Age: 58
End: 2023-01-01
Payer: MEDICARE

## 2023-01-01 ENCOUNTER — HOSPITAL ENCOUNTER (OUTPATIENT)
Dept: ULTRASOUND IMAGING | Facility: HOSPITAL | Age: 58
Discharge: HOME | End: 2023-05-15
Attending: INTERNAL MEDICINE | Admitting: RADIOLOGY
Payer: MEDICARE

## 2023-01-01 ENCOUNTER — HOSPITAL ENCOUNTER (INPATIENT)
Facility: CLINIC | Age: 58
LOS: 4 days | Discharge: HOME | DRG: 441 | End: 2023-12-12
Attending: EMERGENCY MEDICINE | Admitting: STUDENT IN AN ORGANIZED HEALTH CARE EDUCATION/TRAINING PROGRAM
Payer: MEDICARE

## 2023-01-01 ENCOUNTER — HOSPITAL ENCOUNTER (OUTPATIENT)
Dept: ULTRASOUND IMAGING | Facility: HOSPITAL | Age: 58
Discharge: HOME | End: 2023-01-09
Attending: INTERNAL MEDICINE | Admitting: RADIOLOGY
Payer: MEDICARE

## 2023-01-01 ENCOUNTER — HOSPITAL ENCOUNTER (OUTPATIENT)
Dept: ULTRASOUND IMAGING | Facility: HOSPITAL | Age: 58
Discharge: HOME | End: 2023-08-16
Attending: INTERNAL MEDICINE | Admitting: RADIOLOGY
Payer: MEDICARE

## 2023-01-01 VITALS
RESPIRATION RATE: 18 BRPM | SYSTOLIC BLOOD PRESSURE: 98 MMHG | HEART RATE: 96 BPM | OXYGEN SATURATION: 99 % | DIASTOLIC BLOOD PRESSURE: 64 MMHG

## 2023-01-01 VITALS
RESPIRATION RATE: 20 BRPM | DIASTOLIC BLOOD PRESSURE: 78 MMHG | HEART RATE: 115 BPM | OXYGEN SATURATION: 100 % | SYSTOLIC BLOOD PRESSURE: 122 MMHG

## 2023-01-01 VITALS
SYSTOLIC BLOOD PRESSURE: 93 MMHG | DIASTOLIC BLOOD PRESSURE: 70 MMHG | OXYGEN SATURATION: 100 % | HEART RATE: 78 BPM | TEMPERATURE: 97.9 F | RESPIRATION RATE: 18 BRPM

## 2023-01-01 VITALS
RESPIRATION RATE: 16 BRPM | SYSTOLIC BLOOD PRESSURE: 99 MMHG | DIASTOLIC BLOOD PRESSURE: 63 MMHG | OXYGEN SATURATION: 99 % | TEMPERATURE: 96.8 F | HEART RATE: 88 BPM

## 2023-01-01 VITALS — OXYGEN SATURATION: 100 % | DIASTOLIC BLOOD PRESSURE: 64 MMHG | HEART RATE: 85 BPM | SYSTOLIC BLOOD PRESSURE: 100 MMHG

## 2023-01-01 VITALS
TEMPERATURE: 98.3 F | HEIGHT: 61 IN | RESPIRATION RATE: 14 BRPM | WEIGHT: 97.66 LBS | BODY MASS INDEX: 18.44 KG/M2 | OXYGEN SATURATION: 100 % | DIASTOLIC BLOOD PRESSURE: 65 MMHG | HEART RATE: 102 BPM | SYSTOLIC BLOOD PRESSURE: 107 MMHG

## 2023-01-01 VITALS
HEART RATE: 74 BPM | RESPIRATION RATE: 14 BRPM | TEMPERATURE: 98.5 F | DIASTOLIC BLOOD PRESSURE: 62 MMHG | OXYGEN SATURATION: 97 % | SYSTOLIC BLOOD PRESSURE: 99 MMHG

## 2023-01-01 VITALS
DIASTOLIC BLOOD PRESSURE: 68 MMHG | SYSTOLIC BLOOD PRESSURE: 103 MMHG | RESPIRATION RATE: 18 BRPM | TEMPERATURE: 98.1 F | HEART RATE: 90 BPM | OXYGEN SATURATION: 99 %

## 2023-01-01 VITALS
DIASTOLIC BLOOD PRESSURE: 69 MMHG | OXYGEN SATURATION: 100 % | HEART RATE: 87 BPM | TEMPERATURE: 97 F | RESPIRATION RATE: 16 BRPM | SYSTOLIC BLOOD PRESSURE: 100 MMHG

## 2023-01-01 VITALS
TEMPERATURE: 98 F | SYSTOLIC BLOOD PRESSURE: 107 MMHG | DIASTOLIC BLOOD PRESSURE: 72 MMHG | RESPIRATION RATE: 16 BRPM | HEART RATE: 97 BPM | OXYGEN SATURATION: 100 %

## 2023-01-01 VITALS
DIASTOLIC BLOOD PRESSURE: 66 MMHG | RESPIRATION RATE: 24 BRPM | SYSTOLIC BLOOD PRESSURE: 102 MMHG | HEART RATE: 86 BPM | TEMPERATURE: 97.6 F | OXYGEN SATURATION: 100 %

## 2023-01-01 VITALS
OXYGEN SATURATION: 100 % | DIASTOLIC BLOOD PRESSURE: 71 MMHG | RESPIRATION RATE: 16 BRPM | HEART RATE: 90 BPM | SYSTOLIC BLOOD PRESSURE: 105 MMHG

## 2023-01-01 VITALS
DIASTOLIC BLOOD PRESSURE: 51 MMHG | OXYGEN SATURATION: 100 % | RESPIRATION RATE: 18 BRPM | HEART RATE: 80 BPM | SYSTOLIC BLOOD PRESSURE: 86 MMHG | TEMPERATURE: 97.7 F

## 2023-01-01 VITALS
OXYGEN SATURATION: 100 % | HEART RATE: 94 BPM | RESPIRATION RATE: 16 BRPM | DIASTOLIC BLOOD PRESSURE: 65 MMHG | TEMPERATURE: 99.4 F | SYSTOLIC BLOOD PRESSURE: 97 MMHG

## 2023-01-01 VITALS
OXYGEN SATURATION: 100 % | RESPIRATION RATE: 20 BRPM | TEMPERATURE: 97.8 F | DIASTOLIC BLOOD PRESSURE: 62 MMHG | HEART RATE: 100 BPM | SYSTOLIC BLOOD PRESSURE: 106 MMHG

## 2023-01-01 VITALS
HEART RATE: 91 BPM | RESPIRATION RATE: 18 BRPM | TEMPERATURE: 98 F | DIASTOLIC BLOOD PRESSURE: 70 MMHG | OXYGEN SATURATION: 100 % | SYSTOLIC BLOOD PRESSURE: 102 MMHG

## 2023-01-01 VITALS
DIASTOLIC BLOOD PRESSURE: 64 MMHG | TEMPERATURE: 96.7 F | RESPIRATION RATE: 20 BRPM | SYSTOLIC BLOOD PRESSURE: 92 MMHG | HEART RATE: 74 BPM | OXYGEN SATURATION: 100 %

## 2023-01-01 DIAGNOSIS — K70.31 ALCOHOLIC CIRRHOSIS OF LIVER WITH ASCITES (H): ICD-10-CM

## 2023-01-01 DIAGNOSIS — R60.9 EDEMA: ICD-10-CM

## 2023-01-01 DIAGNOSIS — R18.8 CIRRHOSIS OF LIVER WITH ASCITES, UNSPECIFIED HEPATIC CIRRHOSIS TYPE (H): ICD-10-CM

## 2023-01-01 DIAGNOSIS — R94.31 QT PROLONGATION: ICD-10-CM

## 2023-01-01 DIAGNOSIS — I10 ESSENTIAL (PRIMARY) HYPERTENSION: ICD-10-CM

## 2023-01-01 DIAGNOSIS — B02.9 HERPES ZOSTER WITHOUT COMPLICATION: ICD-10-CM

## 2023-01-01 DIAGNOSIS — K74.60 CIRRHOSIS OF LIVER WITH ASCITES, UNSPECIFIED HEPATIC CIRRHOSIS TYPE (H): ICD-10-CM

## 2023-01-01 DIAGNOSIS — R18.8 OTHER ASCITES: ICD-10-CM

## 2023-01-01 DIAGNOSIS — K70.31 ALCOHOLIC CIRRHOSIS OF LIVER WITH ASCITES (H): Primary | ICD-10-CM

## 2023-01-01 DIAGNOSIS — E87.6 HYPOKALEMIA: ICD-10-CM

## 2023-01-01 DIAGNOSIS — K76.82 HEPATIC ENCEPHALOPATHY (H): ICD-10-CM

## 2023-01-01 DIAGNOSIS — E83.42 HYPOMAGNESEMIA: ICD-10-CM

## 2023-01-01 DIAGNOSIS — K64.4 EXTERNAL HEMORRHOIDS: ICD-10-CM

## 2023-01-01 LAB
ADV 40+41 DNA STL QL NAA+NON-PROBE: NEGATIVE
AFP SERPL-MCNC: 3.2 NG/ML
ALBUMIN SERPL BCG-MCNC: 2.2 G/DL (ref 3.5–5.2)
ALBUMIN SERPL BCG-MCNC: 2.4 G/DL (ref 3.5–5.2)
ALBUMIN SERPL BCG-MCNC: 2.6 G/DL (ref 3.5–5.2)
ALBUMIN SERPL BCG-MCNC: 2.7 G/DL (ref 3.5–5.2)
ALBUMIN SERPL BCG-MCNC: 2.9 G/DL (ref 3.5–5.2)
ALBUMIN SERPL BCG-MCNC: 2.9 G/DL (ref 3.5–5.2)
ALBUMIN SERPL BCG-MCNC: 3.2 G/DL (ref 3.5–5.2)
ALBUMIN UR-MCNC: NEGATIVE MG/DL
ALBUMIN UR-MCNC: NEGATIVE MG/DL
ALP SERPL-CCNC: 118 U/L (ref 35–104)
ALP SERPL-CCNC: 129 U/L (ref 40–150)
ALP SERPL-CCNC: 133 U/L (ref 35–104)
ALP SERPL-CCNC: 137 U/L (ref 40–150)
ALP SERPL-CCNC: 143 U/L (ref 40–150)
ALP SERPL-CCNC: 88 U/L (ref 35–104)
ALP SERPL-CCNC: 96 U/L (ref 35–104)
ALT SERPL W P-5'-P-CCNC: 18 U/L (ref 0–50)
ALT SERPL W P-5'-P-CCNC: 25 U/L (ref 0–50)
ALT SERPL W P-5'-P-CCNC: 30 U/L (ref 0–50)
ALT SERPL W P-5'-P-CCNC: 31 U/L (ref 10–35)
ALT SERPL W P-5'-P-CCNC: 32 U/L (ref 0–50)
ALT SERPL W P-5'-P-CCNC: 38 U/L (ref 0–50)
ALT SERPL W P-5'-P-CCNC: 48 U/L (ref 10–35)
AMMONIA PLAS-SCNC: 122 UMOL/L (ref 11–51)
AMMONIA PLAS-SCNC: 154 UMOL/L (ref 11–51)
AMMONIA PLAS-SCNC: 45 UMOL/L (ref 11–51)
AMMONIA PLAS-SCNC: 83 UMOL/L (ref 11–51)
AMORPH CRY #/AREA URNS HPF: ABNORMAL /HPF
ANION GAP SERPL CALCULATED.3IONS-SCNC: 10 MMOL/L (ref 7–15)
ANION GAP SERPL CALCULATED.3IONS-SCNC: 11 MMOL/L (ref 7–15)
ANION GAP SERPL CALCULATED.3IONS-SCNC: 11 MMOL/L (ref 7–15)
ANION GAP SERPL CALCULATED.3IONS-SCNC: 12 MMOL/L (ref 7–15)
ANION GAP SERPL CALCULATED.3IONS-SCNC: 13 MMOL/L (ref 7–15)
ANION GAP SERPL CALCULATED.3IONS-SCNC: 19 MMOL/L (ref 7–15)
ANION GAP SERPL CALCULATED.3IONS-SCNC: 9 MMOL/L (ref 7–15)
APPEARANCE UR: ABNORMAL
APPEARANCE UR: CLEAR
APTT PPP: 33 SECONDS (ref 22–38)
AST SERPL W P-5'-P-CCNC: 161 U/L (ref 10–35)
AST SERPL W P-5'-P-CCNC: 52 U/L (ref 0–45)
AST SERPL W P-5'-P-CCNC: 59 U/L (ref 0–45)
AST SERPL W P-5'-P-CCNC: 60 U/L (ref 0–45)
AST SERPL W P-5'-P-CCNC: 61 U/L (ref 0–45)
AST SERPL W P-5'-P-CCNC: 63 U/L (ref 0–45)
AST SERPL W P-5'-P-CCNC: 82 U/L (ref 10–35)
ASTRO TYP 1-8 RNA STL QL NAA+NON-PROBE: NEGATIVE
ATRIAL RATE - MUSE: 100 BPM
ATRIAL RATE - MUSE: 91 BPM
BACTERIA #/AREA URNS HPF: ABNORMAL /HPF
BACTERIA #/AREA URNS HPF: ABNORMAL /HPF
BACTERIA UR CULT: NORMAL
BASE EXCESS BLDV CALC-SCNC: 0.7 MMOL/L (ref -7.7–1.9)
BASOPHILS # BLD AUTO: 0 10E3/UL (ref 0–0.2)
BASOPHILS NFR BLD AUTO: 0 %
BASOPHILS NFR BLD AUTO: 0 %
BASOPHILS NFR BLD AUTO: 1 %
BILIRUB SERPL-MCNC: 0.9 MG/DL
BILIRUB SERPL-MCNC: 1.7 MG/DL
BILIRUB SERPL-MCNC: 1.9 MG/DL
BILIRUB SERPL-MCNC: 2.1 MG/DL
BILIRUB SERPL-MCNC: 2.6 MG/DL
BILIRUB SERPL-MCNC: 2.7 MG/DL
BILIRUB SERPL-MCNC: 3.3 MG/DL
BILIRUB UR QL STRIP: NEGATIVE
BILIRUB UR QL STRIP: NEGATIVE
BUN SERPL-MCNC: 10 MG/DL (ref 6–20)
BUN SERPL-MCNC: 10 MG/DL (ref 6–20)
BUN SERPL-MCNC: 10.5 MG/DL (ref 6–20)
BUN SERPL-MCNC: 10.9 MG/DL (ref 6–20)
BUN SERPL-MCNC: 15.6 MG/DL (ref 6–20)
BUN SERPL-MCNC: 16.6 MG/DL (ref 6–20)
BUN SERPL-MCNC: 5.4 MG/DL (ref 6–20)
BUN SERPL-MCNC: 5.9 MG/DL (ref 6–20)
BUN SERPL-MCNC: 9.1 MG/DL (ref 6–20)
C CAYETANENSIS DNA STL QL NAA+NON-PROBE: NEGATIVE
CALCIUM SERPL-MCNC: 7.6 MG/DL (ref 8.6–10)
CALCIUM SERPL-MCNC: 7.6 MG/DL (ref 8.6–10)
CALCIUM SERPL-MCNC: 8 MG/DL (ref 8.6–10)
CALCIUM SERPL-MCNC: 8.1 MG/DL (ref 8.6–10)
CALCIUM SERPL-MCNC: 8.2 MG/DL (ref 8.6–10)
CALCIUM SERPL-MCNC: 8.4 MG/DL (ref 8.6–10)
CALCIUM SERPL-MCNC: 8.5 MG/DL (ref 8.6–10)
CALCIUM SERPL-MCNC: 8.7 MG/DL (ref 8.6–10)
CALCIUM SERPL-MCNC: 8.7 MG/DL (ref 8.6–10)
CAMPYLOBACTER DNA SPEC NAA+PROBE: NEGATIVE
CHLORIDE SERPL-SCNC: 100 MMOL/L (ref 98–107)
CHLORIDE SERPL-SCNC: 105 MMOL/L (ref 98–107)
CHLORIDE SERPL-SCNC: 111 MMOL/L (ref 98–107)
CHLORIDE SERPL-SCNC: 92 MMOL/L (ref 98–107)
CHLORIDE SERPL-SCNC: 94 MMOL/L (ref 98–107)
CHLORIDE SERPL-SCNC: 95 MMOL/L (ref 98–107)
CHLORIDE SERPL-SCNC: 96 MMOL/L (ref 98–107)
CHLORIDE SERPL-SCNC: 98 MMOL/L (ref 98–107)
CHLORIDE SERPL-SCNC: 99 MMOL/L (ref 98–107)
COLOR UR AUTO: ABNORMAL
COLOR UR AUTO: YELLOW
CREAT SERPL-MCNC: 0.5 MG/DL (ref 0.51–0.95)
CREAT SERPL-MCNC: 0.59 MG/DL (ref 0.51–0.95)
CREAT SERPL-MCNC: 0.6 MG/DL (ref 0.51–0.95)
CREAT SERPL-MCNC: 0.69 MG/DL (ref 0.51–0.95)
CREAT SERPL-MCNC: 0.73 MG/DL (ref 0.51–0.95)
CREAT SERPL-MCNC: 0.77 MG/DL (ref 0.51–0.95)
CREAT SERPL-MCNC: 0.77 MG/DL (ref 0.51–0.95)
CREAT SERPL-MCNC: 0.78 MG/DL (ref 0.51–0.95)
CREAT SERPL-MCNC: 0.79 MG/DL (ref 0.51–0.95)
CRYPTOSP DNA STL QL NAA+NON-PROBE: NEGATIVE
DEPRECATED HCO3 PLAS-SCNC: 19 MMOL/L (ref 22–29)
DEPRECATED HCO3 PLAS-SCNC: 20 MMOL/L (ref 22–29)
DEPRECATED HCO3 PLAS-SCNC: 21 MMOL/L (ref 22–29)
DEPRECATED HCO3 PLAS-SCNC: 23 MMOL/L (ref 22–29)
DEPRECATED HCO3 PLAS-SCNC: 24 MMOL/L (ref 22–29)
DEPRECATED HCO3 PLAS-SCNC: 26 MMOL/L (ref 22–29)
DEPRECATED HCO3 PLAS-SCNC: 26 MMOL/L (ref 22–29)
DEPRECATED HCO3 PLAS-SCNC: 27 MMOL/L (ref 22–29)
DIASTOLIC BLOOD PRESSURE - MUSE: NORMAL MMHG
DIASTOLIC BLOOD PRESSURE - MUSE: NORMAL MMHG
E COLI O157 DNA STL QL NAA+NON-PROBE: NORMAL
E HISTOLYT DNA STL QL NAA+NON-PROBE: NEGATIVE
EAEC ASTA GENE ISLT QL NAA+PROBE: NEGATIVE
EC STX1+STX2 GENES STL QL NAA+NON-PROBE: NEGATIVE
EGFRCR SERPLBLD CKD-EPI 2021: 88 ML/MIN/1.73M2
EGFRCR SERPLBLD CKD-EPI 2021: 89 ML/MIN/1.73M2
EGFRCR SERPLBLD CKD-EPI 2021: >90 ML/MIN/1.73M2
ELASTASE PANC STL-MCNT: 597 UG/G
EOSINOPHIL # BLD AUTO: 0 10E3/UL (ref 0–0.7)
EOSINOPHIL # BLD AUTO: 0 10E3/UL (ref 0–0.7)
EOSINOPHIL # BLD AUTO: 0.1 10E3/UL (ref 0–0.7)
EOSINOPHIL NFR BLD AUTO: 1 %
EPEC EAE GENE STL QL NAA+NON-PROBE: NEGATIVE
ERYTHROCYTE [DISTWIDTH] IN BLOOD BY AUTOMATED COUNT: 14.6 % (ref 10–15)
ERYTHROCYTE [DISTWIDTH] IN BLOOD BY AUTOMATED COUNT: 15.2 % (ref 10–15)
ERYTHROCYTE [DISTWIDTH] IN BLOOD BY AUTOMATED COUNT: 16.2 % (ref 10–15)
ERYTHROCYTE [DISTWIDTH] IN BLOOD BY AUTOMATED COUNT: 19.9 % (ref 10–15)
ERYTHROCYTE [DISTWIDTH] IN BLOOD BY AUTOMATED COUNT: 20.5 % (ref 10–15)
ETEC LTA+ST1A+ST1B TOX ST NAA+NON-PROBE: NEGATIVE
G LAMBLIA DNA STL QL NAA+NON-PROBE: NEGATIVE
GFR SERPL CREATININE-BSD FRML MDRD: >90 ML/MIN/1.73M2
GLUCOSE SERPL-MCNC: 103 MG/DL (ref 70–99)
GLUCOSE SERPL-MCNC: 104 MG/DL (ref 70–99)
GLUCOSE SERPL-MCNC: 115 MG/DL (ref 70–99)
GLUCOSE SERPL-MCNC: 120 MG/DL (ref 70–99)
GLUCOSE SERPL-MCNC: 136 MG/DL (ref 70–99)
GLUCOSE SERPL-MCNC: 86 MG/DL (ref 70–99)
GLUCOSE SERPL-MCNC: 96 MG/DL (ref 70–99)
GLUCOSE UR STRIP-MCNC: NEGATIVE MG/DL
GLUCOSE UR STRIP-MCNC: NEGATIVE MG/DL
HCO3 BLDV-SCNC: 24 MMOL/L (ref 21–28)
HCO3 SERPL-SCNC: 26 MMOL/L (ref 22–29)
HCT VFR BLD AUTO: 30.9 % (ref 35–47)
HCT VFR BLD AUTO: 31.1 % (ref 35–47)
HCT VFR BLD AUTO: 31.3 % (ref 35–47)
HCT VFR BLD AUTO: 32.7 % (ref 35–47)
HCT VFR BLD AUTO: 33.9 % (ref 35–47)
HGB BLD-MCNC: 10.1 G/DL (ref 11.7–15.7)
HGB BLD-MCNC: 10.4 G/DL (ref 11.7–15.7)
HGB BLD-MCNC: 10.5 G/DL (ref 11.7–15.7)
HGB BLD-MCNC: 11.2 G/DL (ref 11.7–15.7)
HGB BLD-MCNC: 11.7 G/DL (ref 11.7–15.7)
HGB UR QL STRIP: NEGATIVE
HGB UR QL STRIP: NEGATIVE
HOLD SPECIMEN: NORMAL
HYALINE CASTS: 1 /LPF
IMM GRANULOCYTES # BLD: 0 10E3/UL
IMM GRANULOCYTES NFR BLD: 0 %
IMM GRANULOCYTES NFR BLD: 1 %
IMM GRANULOCYTES NFR BLD: 1 %
INR PPP: 1.08 (ref 0.85–1.15)
INR PPP: 1.37 (ref 0.85–1.15)
INR PPP: 1.41 (ref 0.85–1.15)
INTERPRETATION ECG - MUSE: NORMAL
INTERPRETATION ECG - MUSE: NORMAL
KETONES UR STRIP-MCNC: NEGATIVE MG/DL
KETONES UR STRIP-MCNC: NEGATIVE MG/DL
LEUKOCYTE ESTERASE UR QL STRIP: ABNORMAL
LEUKOCYTE ESTERASE UR QL STRIP: NEGATIVE
LIPASE SERPL-CCNC: 66 U/L (ref 13–60)
LVEF ECHO: NORMAL
LYMPHOCYTES # BLD AUTO: 0.5 10E3/UL (ref 0.8–5.3)
LYMPHOCYTES # BLD AUTO: 0.7 10E3/UL (ref 0.8–5.3)
LYMPHOCYTES # BLD AUTO: 0.8 10E3/UL (ref 0.8–5.3)
LYMPHOCYTES NFR BLD AUTO: 11 %
LYMPHOCYTES NFR BLD AUTO: 13 %
LYMPHOCYTES NFR BLD AUTO: 14 %
MAGNESIUM SERPL-MCNC: 1.4 MG/DL (ref 1.7–2.3)
MAGNESIUM SERPL-MCNC: 1.6 MG/DL (ref 1.7–2.3)
MCH RBC QN AUTO: 33.1 PG (ref 26.5–33)
MCH RBC QN AUTO: 33.6 PG (ref 26.5–33)
MCH RBC QN AUTO: 33.8 PG (ref 26.5–33)
MCH RBC QN AUTO: 33.9 PG (ref 26.5–33)
MCH RBC QN AUTO: 34.7 PG (ref 26.5–33)
MCHC RBC AUTO-ENTMCNC: 32.7 G/DL (ref 31.5–36.5)
MCHC RBC AUTO-ENTMCNC: 33.2 G/DL (ref 31.5–36.5)
MCHC RBC AUTO-ENTMCNC: 33.8 G/DL (ref 31.5–36.5)
MCHC RBC AUTO-ENTMCNC: 34.3 G/DL (ref 31.5–36.5)
MCHC RBC AUTO-ENTMCNC: 34.5 G/DL (ref 31.5–36.5)
MCV RBC AUTO: 100 FL (ref 78–100)
MCV RBC AUTO: 100 FL (ref 78–100)
MCV RBC AUTO: 101 FL (ref 78–100)
MCV RBC AUTO: 103 FL (ref 78–100)
MCV RBC AUTO: 98 FL (ref 78–100)
MONOCYTES # BLD AUTO: 0.5 10E3/UL (ref 0–1.3)
MONOCYTES # BLD AUTO: 0.6 10E3/UL (ref 0–1.3)
MONOCYTES # BLD AUTO: 0.6 10E3/UL (ref 0–1.3)
MONOCYTES NFR BLD AUTO: 10 %
MONOCYTES NFR BLD AUTO: 11 %
MONOCYTES NFR BLD AUTO: 12 %
MUCOUS THREADS #/AREA URNS LPF: PRESENT /LPF
NEUTROPHILS # BLD AUTO: 3.4 10E3/UL (ref 1.6–8.3)
NEUTROPHILS # BLD AUTO: 3.6 10E3/UL (ref 1.6–8.3)
NEUTROPHILS # BLD AUTO: 4.7 10E3/UL (ref 1.6–8.3)
NEUTROPHILS NFR BLD AUTO: 73 %
NEUTROPHILS NFR BLD AUTO: 74 %
NEUTROPHILS NFR BLD AUTO: 76 %
NITRATE UR QL: NEGATIVE
NITRATE UR QL: NEGATIVE
NOROVIRUS GI+II RNA STL QL NAA+NON-PROBE: NEGATIVE
NRBC # BLD AUTO: 0 10E3/UL
NRBC BLD AUTO-RTO: 0 /100
NRBC BLD AUTO-RTO: 0 /100
NRBC BLD AUTO-RTO: 1 /100
O2/TOTAL GAS SETTING VFR VENT: 0 %
P AXIS - MUSE: 58 DEGREES
P AXIS - MUSE: 72 DEGREES
P SHIGELLOIDES DNA STL QL NAA+NON-PROBE: NEGATIVE
PCO2 BLDV: 33 MM HG (ref 40–50)
PH BLDV: 7.47 [PH] (ref 7.32–7.43)
PH UR STRIP: 7 [PH] (ref 5–7)
PH UR STRIP: 7.5 [PH] (ref 5–7)
PLATELET # BLD AUTO: 64 10E3/UL (ref 150–450)
PLATELET # BLD AUTO: 66 10E3/UL (ref 150–450)
PLATELET # BLD AUTO: 70 10E3/UL (ref 150–450)
PLATELET # BLD AUTO: 77 10E3/UL (ref 150–450)
PLATELET # BLD AUTO: 93 10E3/UL (ref 150–450)
PO2 BLDV: 79 MM HG (ref 25–47)
POTASSIUM SERPL-SCNC: 2.9 MMOL/L (ref 3.4–5.3)
POTASSIUM SERPL-SCNC: 3.1 MMOL/L (ref 3.4–5.3)
POTASSIUM SERPL-SCNC: 3.2 MMOL/L (ref 3.4–5.3)
POTASSIUM SERPL-SCNC: 3.4 MMOL/L (ref 3.4–5.3)
POTASSIUM SERPL-SCNC: 3.5 MMOL/L (ref 3.4–5.3)
POTASSIUM SERPL-SCNC: 3.6 MMOL/L (ref 3.4–5.3)
POTASSIUM SERPL-SCNC: 3.7 MMOL/L (ref 3.4–5.3)
POTASSIUM SERPL-SCNC: 4 MMOL/L (ref 3.4–5.3)
POTASSIUM SERPL-SCNC: 4.2 MMOL/L (ref 3.4–5.3)
PR INTERVAL - MUSE: 156 MS
PR INTERVAL - MUSE: 166 MS
PROT SERPL-MCNC: 5.2 G/DL (ref 6.4–8.3)
PROT SERPL-MCNC: 5.5 G/DL (ref 6.4–8.3)
PROT SERPL-MCNC: 5.6 G/DL (ref 6.4–8.3)
PROT SERPL-MCNC: 5.6 G/DL (ref 6.4–8.3)
PROT SERPL-MCNC: 5.7 G/DL (ref 6.4–8.3)
PROT SERPL-MCNC: 5.8 G/DL (ref 6.4–8.3)
PROT SERPL-MCNC: 6.1 G/DL (ref 6.4–8.3)
QRS DURATION - MUSE: 70 MS
QRS DURATION - MUSE: 74 MS
QT - MUSE: 396 MS
QT - MUSE: 402 MS
QTC - MUSE: 487 MS
QTC - MUSE: 518 MS
R AXIS - MUSE: 40 DEGREES
R AXIS - MUSE: 42 DEGREES
RBC # BLD AUTO: 3.01 10E6/UL (ref 3.8–5.2)
RBC # BLD AUTO: 3.1 10E6/UL (ref 3.8–5.2)
RBC # BLD AUTO: 3.14 10E6/UL (ref 3.8–5.2)
RBC # BLD AUTO: 3.23 10E6/UL (ref 3.8–5.2)
RBC # BLD AUTO: 3.46 10E6/UL (ref 3.8–5.2)
RBC URINE: 1 /HPF
RBC URINE: 1 /HPF
RVA RNA STL QL NAA+NON-PROBE: NEGATIVE
SALMONELLA SP RPOD STL QL NAA+PROBE: NEGATIVE
SAPO I+II+IV+V RNA STL QL NAA+NON-PROBE: NEGATIVE
SHIGELLA SP+EIEC IPAH ST NAA+NON-PROBE: NEGATIVE
SODIUM SERPL-SCNC: 129 MMOL/L (ref 136–145)
SODIUM SERPL-SCNC: 130 MMOL/L (ref 136–145)
SODIUM SERPL-SCNC: 132 MMOL/L (ref 135–145)
SODIUM SERPL-SCNC: 133 MMOL/L (ref 135–145)
SODIUM SERPL-SCNC: 133 MMOL/L (ref 136–145)
SODIUM SERPL-SCNC: 133 MMOL/L (ref 136–145)
SODIUM SERPL-SCNC: 137 MMOL/L (ref 135–145)
SODIUM SERPL-SCNC: 138 MMOL/L (ref 135–145)
SODIUM SERPL-SCNC: 142 MMOL/L (ref 135–145)
SP GR UR STRIP: 1 (ref 1–1.03)
SP GR UR STRIP: 1.01 (ref 1–1.03)
SQUAMOUS EPITHELIAL: 5 /HPF
SYSTOLIC BLOOD PRESSURE - MUSE: NORMAL MMHG
SYSTOLIC BLOOD PRESSURE - MUSE: NORMAL MMHG
T AXIS - MUSE: 53 DEGREES
T AXIS - MUSE: 64 DEGREES
UROBILINOGEN UR STRIP-MCNC: 2 MG/DL
UROBILINOGEN UR STRIP-MCNC: NORMAL MG/DL
V CHOLERAE DNA SPEC QL NAA+PROBE: NEGATIVE
VENTRICULAR RATE- MUSE: 100 BPM
VENTRICULAR RATE- MUSE: 91 BPM
VIBRIO DNA SPEC NAA+PROBE: NEGATIVE
WBC # BLD AUTO: 4.5 10E3/UL (ref 4–11)
WBC # BLD AUTO: 5 10E3/UL (ref 4–11)
WBC # BLD AUTO: 5.9 10E3/UL (ref 4–11)
WBC # BLD AUTO: 6 10E3/UL (ref 4–11)
WBC # BLD AUTO: 6.2 10E3/UL (ref 4–11)
WBC URINE: 20 /HPF
WBC URINE: <1 /HPF
Y ENTEROCOL DNA STL QL NAA+PROBE: NEGATIVE

## 2023-01-01 PROCEDURE — 999N000154 HC STATISTIC RADIOLOGY XRAY, US, CT, MAR, NM

## 2023-01-01 PROCEDURE — 999N000203 HC STATISTICAL VASC ACCESS NURSE TIME, 16-31 MINUTES

## 2023-01-01 PROCEDURE — 73060 X-RAY EXAM OF HUMERUS: CPT | Mod: LT

## 2023-01-01 PROCEDURE — 71045 X-RAY EXAM CHEST 1 VIEW: CPT

## 2023-01-01 PROCEDURE — P9047 ALBUMIN (HUMAN), 25%, 50ML: HCPCS | Performed by: INTERNAL MEDICINE

## 2023-01-01 PROCEDURE — 74177 CT ABD & PELVIS W/CONTRAST: CPT | Mod: MG

## 2023-01-01 PROCEDURE — 272N000706 US PARACENTESIS WITH ALBUMIN

## 2023-01-01 PROCEDURE — 93005 ELECTROCARDIOGRAM TRACING: CPT

## 2023-01-01 PROCEDURE — 999N000127 HC STATISTIC PERIPHERAL IV START W US GUIDANCE

## 2023-01-01 PROCEDURE — 250N000011 HC RX IP 250 OP 636: Performed by: STUDENT IN AN ORGANIZED HEALTH CARE EDUCATION/TRAINING PROGRAM

## 2023-01-01 PROCEDURE — 272N000710 US PARACENTESIS WITH ALBUMIN

## 2023-01-01 PROCEDURE — 99222 1ST HOSP IP/OBS MODERATE 55: CPT | Mod: AI | Performed by: NURSE PRACTITIONER

## 2023-01-01 PROCEDURE — 250N000011 HC RX IP 250 OP 636: Performed by: INTERNAL MEDICINE

## 2023-01-01 PROCEDURE — 99232 SBSQ HOSP IP/OBS MODERATE 35: CPT | Performed by: STUDENT IN AN ORGANIZED HEALTH CARE EDUCATION/TRAINING PROGRAM

## 2023-01-01 PROCEDURE — C1729 CATH, DRAINAGE: HCPCS

## 2023-01-01 PROCEDURE — A9585 GADOBUTROL INJECTION: HCPCS | Performed by: STUDENT IN AN ORGANIZED HEALTH CARE EDUCATION/TRAINING PROGRAM

## 2023-01-01 PROCEDURE — 36415 COLL VENOUS BLD VENIPUNCTURE: CPT | Performed by: EMERGENCY MEDICINE

## 2023-01-01 PROCEDURE — 99238 HOSP IP/OBS DSCHRG MGMT 30/<: CPT | Performed by: INTERNAL MEDICINE

## 2023-01-01 PROCEDURE — 83735 ASSAY OF MAGNESIUM: CPT | Mod: ORL | Performed by: PHYSICIAN ASSISTANT

## 2023-01-01 PROCEDURE — 250N000009 HC RX 250: Performed by: RADIOLOGY

## 2023-01-01 PROCEDURE — 258N000003 HC RX IP 258 OP 636: Performed by: EMERGENCY MEDICINE

## 2023-01-01 PROCEDURE — 120N000001 HC R&B MED SURG/OB

## 2023-01-01 PROCEDURE — 81001 URINALYSIS AUTO W/SCOPE: CPT | Performed by: EMERGENCY MEDICINE

## 2023-01-01 PROCEDURE — 36415 COLL VENOUS BLD VENIPUNCTURE: CPT | Performed by: PHYSICIAN ASSISTANT

## 2023-01-01 PROCEDURE — 250N000013 HC RX MED GY IP 250 OP 250 PS 637: Performed by: STUDENT IN AN ORGANIZED HEALTH CARE EDUCATION/TRAINING PROGRAM

## 2023-01-01 PROCEDURE — 250N000011 HC RX IP 250 OP 636

## 2023-01-01 PROCEDURE — G1010 CDSM STANSON: HCPCS

## 2023-01-01 PROCEDURE — 999N000248 HC STATISTIC IV INSERT WITH US BY RN

## 2023-01-01 PROCEDURE — P9047 ALBUMIN (HUMAN), 25%, 50ML: HCPCS

## 2023-01-01 PROCEDURE — 250N000013 HC RX MED GY IP 250 OP 250 PS 637: Performed by: INTERNAL MEDICINE

## 2023-01-01 PROCEDURE — 85610 PROTHROMBIN TIME: CPT | Performed by: EMERGENCY MEDICINE

## 2023-01-01 PROCEDURE — 99222 1ST HOSP IP/OBS MODERATE 55: CPT | Mod: AI | Performed by: STUDENT IN AN ORGANIZED HEALTH CARE EDUCATION/TRAINING PROGRAM

## 2023-01-01 PROCEDURE — 258N000003 HC RX IP 258 OP 636: Performed by: STUDENT IN AN ORGANIZED HEALTH CARE EDUCATION/TRAINING PROGRAM

## 2023-01-01 PROCEDURE — 250N000013 HC RX MED GY IP 250 OP 250 PS 637: Performed by: EMERGENCY MEDICINE

## 2023-01-01 PROCEDURE — 250N000011 HC RX IP 250 OP 636: Mod: JZ

## 2023-01-01 PROCEDURE — 250N000009 HC RX 250: Performed by: STUDENT IN AN ORGANIZED HEALTH CARE EDUCATION/TRAINING PROGRAM

## 2023-01-01 PROCEDURE — 36415 COLL VENOUS BLD VENIPUNCTURE: CPT | Performed by: INTERNAL MEDICINE

## 2023-01-01 PROCEDURE — 0W9G30Z DRAINAGE OF PERITONEAL CAVITY WITH DRAINAGE DEVICE, PERCUTANEOUS APPROACH: ICD-10-PCS | Performed by: RADIOLOGY

## 2023-01-01 PROCEDURE — 82105 ALPHA-FETOPROTEIN SERUM: CPT

## 2023-01-01 PROCEDURE — 99222 1ST HOSP IP/OBS MODERATE 55: CPT | Performed by: INTERNAL MEDICINE

## 2023-01-01 PROCEDURE — C1769 GUIDE WIRE: HCPCS

## 2023-01-01 PROCEDURE — 85730 THROMBOPLASTIN TIME PARTIAL: CPT | Performed by: EMERGENCY MEDICINE

## 2023-01-01 PROCEDURE — 73090 X-RAY EXAM OF FOREARM: CPT | Mod: LT

## 2023-01-01 PROCEDURE — 250N000009 HC RX 250: Performed by: INTERNAL MEDICINE

## 2023-01-01 PROCEDURE — 93005 ELECTROCARDIOGRAM TRACING: CPT | Mod: 59

## 2023-01-01 PROCEDURE — 85025 COMPLETE CBC W/AUTO DIFF WBC: CPT | Performed by: EMERGENCY MEDICINE

## 2023-01-01 PROCEDURE — 82803 BLOOD GASES ANY COMBINATION: CPT | Performed by: INTERNAL MEDICINE

## 2023-01-01 PROCEDURE — 80048 BASIC METABOLIC PNL TOTAL CA: CPT | Mod: ORL | Performed by: PHYSICIAN ASSISTANT

## 2023-01-01 PROCEDURE — 272N000706 US PARACENTESIS WITHOUT ALBUMIN

## 2023-01-01 PROCEDURE — 82140 ASSAY OF AMMONIA: CPT | Performed by: EMERGENCY MEDICINE

## 2023-01-01 PROCEDURE — 83690 ASSAY OF LIPASE: CPT | Performed by: EMERGENCY MEDICINE

## 2023-01-01 PROCEDURE — 250N000011 HC RX IP 250 OP 636: Mod: JZ | Performed by: PHYSICIAN ASSISTANT

## 2023-01-01 PROCEDURE — 70553 MRI BRAIN STEM W/O & W/DYE: CPT | Mod: MG

## 2023-01-01 PROCEDURE — 80053 COMPREHEN METABOLIC PANEL: CPT | Mod: ORL | Performed by: PHYSICIAN ASSISTANT

## 2023-01-01 PROCEDURE — 255N000002 HC RX 255 OP 636: Performed by: STUDENT IN AN ORGANIZED HEALTH CARE EDUCATION/TRAINING PROGRAM

## 2023-01-01 PROCEDURE — 87086 URINE CULTURE/COLONY COUNT: CPT | Performed by: EMERGENCY MEDICINE

## 2023-01-01 PROCEDURE — 80053 COMPREHEN METABOLIC PANEL: CPT | Performed by: EMERGENCY MEDICINE

## 2023-01-01 PROCEDURE — 93306 TTE W/DOPPLER COMPLETE: CPT

## 2023-01-01 PROCEDURE — 80053 COMPREHEN METABOLIC PANEL: CPT | Performed by: STUDENT IN AN ORGANIZED HEALTH CARE EDUCATION/TRAINING PROGRAM

## 2023-01-01 PROCEDURE — 36415 COLL VENOUS BLD VENIPUNCTURE: CPT | Performed by: STUDENT IN AN ORGANIZED HEALTH CARE EDUCATION/TRAINING PROGRAM

## 2023-01-01 PROCEDURE — 87507 IADNA-DNA/RNA PROBE TQ 12-25: CPT | Performed by: STUDENT IN AN ORGANIZED HEALTH CARE EDUCATION/TRAINING PROGRAM

## 2023-01-01 PROCEDURE — 85610 PROTHROMBIN TIME: CPT | Performed by: PHYSICIAN ASSISTANT

## 2023-01-01 PROCEDURE — 85027 COMPLETE CBC AUTOMATED: CPT

## 2023-01-01 PROCEDURE — 99285 EMERGENCY DEPT VISIT HI MDM: CPT | Mod: 25

## 2023-01-01 PROCEDURE — 99232 SBSQ HOSP IP/OBS MODERATE 35: CPT | Performed by: INTERNAL MEDICINE

## 2023-01-01 PROCEDURE — 250N000011 HC RX IP 250 OP 636: Mod: JZ | Performed by: EMERGENCY MEDICINE

## 2023-01-01 PROCEDURE — 272N000001 HC OR GENERAL SUPPLY STERILE

## 2023-01-01 PROCEDURE — 49418 INSERT TUN IP CATH PERC: CPT

## 2023-01-01 PROCEDURE — 272N000710 US PARACENTESIS WITHOUT ALBUMIN

## 2023-01-01 PROCEDURE — 85025 COMPLETE CBC W/AUTO DIFF WBC: CPT | Performed by: PHYSICIAN ASSISTANT

## 2023-01-01 PROCEDURE — 99152 MOD SED SAME PHYS/QHP 5/>YRS: CPT

## 2023-01-01 PROCEDURE — 83735 ASSAY OF MAGNESIUM: CPT | Performed by: EMERGENCY MEDICINE

## 2023-01-01 PROCEDURE — 96365 THER/PROPH/DIAG IV INF INIT: CPT

## 2023-01-01 PROCEDURE — 82653 EL-1 FECAL QUANTITATIVE: CPT | Performed by: STUDENT IN AN ORGANIZED HEALTH CARE EDUCATION/TRAINING PROGRAM

## 2023-01-01 PROCEDURE — 85027 COMPLETE CBC AUTOMATED: CPT | Performed by: STUDENT IN AN ORGANIZED HEALTH CARE EDUCATION/TRAINING PROGRAM

## 2023-01-01 PROCEDURE — 250N000011 HC RX IP 250 OP 636: Mod: JZ | Performed by: STUDENT IN AN ORGANIZED HEALTH CARE EDUCATION/TRAINING PROGRAM

## 2023-01-01 PROCEDURE — 93306 TTE W/DOPPLER COMPLETE: CPT | Mod: 26 | Performed by: INTERNAL MEDICINE

## 2023-01-01 PROCEDURE — 80053 COMPREHEN METABOLIC PANEL: CPT

## 2023-01-01 PROCEDURE — 250N000011 HC RX IP 250 OP 636: Mod: JZ | Performed by: RADIOLOGY

## 2023-01-01 PROCEDURE — 99284 EMERGENCY DEPT VISIT MOD MDM: CPT | Mod: 25

## 2023-01-01 PROCEDURE — 82140 ASSAY OF AMMONIA: CPT | Performed by: INTERNAL MEDICINE

## 2023-01-01 PROCEDURE — 49083 ABD PARACENTESIS W/IMAGING: CPT

## 2023-01-01 PROCEDURE — 250N000009 HC RX 250

## 2023-01-01 PROCEDURE — 82374 ASSAY BLOOD CARBON DIOXIDE: CPT | Performed by: PHYSICIAN ASSISTANT

## 2023-01-01 PROCEDURE — 36415 COLL VENOUS BLD VENIPUNCTURE: CPT

## 2023-01-01 PROCEDURE — 85610 PROTHROMBIN TIME: CPT

## 2023-01-01 RX ORDER — NALOXONE HYDROCHLORIDE 0.4 MG/ML
0.4 INJECTION, SOLUTION INTRAMUSCULAR; INTRAVENOUS; SUBCUTANEOUS
Status: DISCONTINUED | OUTPATIENT
Start: 2023-01-01 | End: 2023-01-01 | Stop reason: HOSPADM

## 2023-01-01 RX ORDER — LIDOCAINE HYDROCHLORIDE 10 MG/ML
10 INJECTION, SOLUTION EPIDURAL; INFILTRATION; INTRACAUDAL; PERINEURAL ONCE
Status: COMPLETED | OUTPATIENT
Start: 2023-01-01 | End: 2023-01-01

## 2023-01-01 RX ORDER — LIDOCAINE HYDROCHLORIDE AND EPINEPHRINE 10; 10 MG/ML; UG/ML
INJECTION, SOLUTION INFILTRATION; PERINEURAL
Status: COMPLETED
Start: 2023-01-01 | End: 2023-01-01

## 2023-01-01 RX ORDER — ALBUMIN (HUMAN) 12.5 G/50ML
25 SOLUTION INTRAVENOUS ONCE
Status: COMPLETED | OUTPATIENT
Start: 2023-01-01 | End: 2023-01-01

## 2023-01-01 RX ORDER — LORAZEPAM 0.5 MG/1
0.5 TABLET ORAL DAILY PRN
COMMUNITY

## 2023-01-01 RX ORDER — IOPAMIDOL 755 MG/ML
100 INJECTION, SOLUTION INTRAVASCULAR ONCE
Status: COMPLETED | OUTPATIENT
Start: 2023-01-01 | End: 2023-01-01

## 2023-01-01 RX ORDER — ALBUMIN (HUMAN) 12.5 G/50ML
12.5 SOLUTION INTRAVENOUS ONCE
Status: COMPLETED | OUTPATIENT
Start: 2023-01-01 | End: 2023-01-01

## 2023-01-01 RX ORDER — ALBUMIN (HUMAN) 12.5 G/50ML
12.5 SOLUTION INTRAVENOUS ONCE
Status: DISCONTINUED | OUTPATIENT
Start: 2023-01-01 | End: 2023-01-01 | Stop reason: HOSPADM

## 2023-01-01 RX ORDER — AMOXICILLIN 250 MG
2 CAPSULE ORAL 2 TIMES DAILY PRN
Status: DISCONTINUED | OUTPATIENT
Start: 2023-01-01 | End: 2023-01-01 | Stop reason: HOSPADM

## 2023-01-01 RX ORDER — VALACYCLOVIR HYDROCHLORIDE 1 G/1
1000 TABLET, FILM COATED ORAL 3 TIMES DAILY
Status: ON HOLD | COMMUNITY
End: 2023-01-01

## 2023-01-01 RX ORDER — SODIUM CHLORIDE 9 MG/ML
INJECTION, SOLUTION INTRAVENOUS CONTINUOUS
Status: DISCONTINUED | OUTPATIENT
Start: 2023-01-01 | End: 2023-01-01

## 2023-01-01 RX ORDER — ALBUMIN (HUMAN) 12.5 G/50ML
50 SOLUTION INTRAVENOUS ONCE
Status: COMPLETED | OUTPATIENT
Start: 2023-01-01 | End: 2023-01-01

## 2023-01-01 RX ORDER — CEFAZOLIN SODIUM 2 G/100ML
INJECTION, SOLUTION INTRAVENOUS
Status: COMPLETED
Start: 2023-01-01 | End: 2023-01-01

## 2023-01-01 RX ORDER — ALBUMIN (HUMAN) 12.5 G/50ML
33 SOLUTION INTRAVENOUS ONCE
Status: COMPLETED | OUTPATIENT
Start: 2023-01-01 | End: 2023-01-01

## 2023-01-01 RX ORDER — ALBUMIN (HUMAN) 12.5 G/50ML
12.5 SOLUTION INTRAVENOUS
Status: DISCONTINUED | OUTPATIENT
Start: 2023-01-01 | End: 2023-01-01 | Stop reason: HOSPADM

## 2023-01-01 RX ORDER — GABAPENTIN 100 MG/1
100 CAPSULE ORAL 3 TIMES DAILY
Status: DISCONTINUED | OUTPATIENT
Start: 2023-01-01 | End: 2023-01-01 | Stop reason: HOSPADM

## 2023-01-01 RX ORDER — VALACYCLOVIR HYDROCHLORIDE 1 G/1
1000 TABLET, FILM COATED ORAL 3 TIMES DAILY
Status: DISCONTINUED | OUTPATIENT
Start: 2023-01-01 | End: 2023-01-01 | Stop reason: ALTCHOICE

## 2023-01-01 RX ORDER — LIDOCAINE 40 MG/G
CREAM TOPICAL
Status: DISCONTINUED | OUTPATIENT
Start: 2023-01-01 | End: 2023-01-01 | Stop reason: HOSPADM

## 2023-01-01 RX ORDER — LACTULOSE 10 G/15ML
20 SOLUTION ORAL ONCE
Status: COMPLETED | OUTPATIENT
Start: 2023-01-01 | End: 2023-01-01

## 2023-01-01 RX ORDER — ONDANSETRON 2 MG/ML
4 INJECTION INTRAMUSCULAR; INTRAVENOUS EVERY 6 HOURS PRN
Status: DISCONTINUED | OUTPATIENT
Start: 2023-01-01 | End: 2023-01-01 | Stop reason: HOSPADM

## 2023-01-01 RX ORDER — BISACODYL 10 MG
10 SUPPOSITORY, RECTAL RECTAL DAILY PRN
Status: DISCONTINUED | OUTPATIENT
Start: 2023-01-01 | End: 2023-01-01 | Stop reason: HOSPADM

## 2023-01-01 RX ORDER — LACTULOSE 10 G/15ML
20 SOLUTION ORAL 3 TIMES DAILY
Status: ON HOLD | COMMUNITY
End: 2023-01-01

## 2023-01-01 RX ORDER — ALBUMIN (HUMAN) 12.5 G/50ML
12.5 SOLUTION INTRAVENOUS 4 TIMES DAILY PRN
Status: DISCONTINUED | OUTPATIENT
Start: 2023-01-01 | End: 2023-01-01 | Stop reason: HOSPADM

## 2023-01-01 RX ORDER — MAGNESIUM SULFATE HEPTAHYDRATE 40 MG/ML
2 INJECTION, SOLUTION INTRAVENOUS ONCE
Status: COMPLETED | OUTPATIENT
Start: 2023-01-01 | End: 2023-01-01

## 2023-01-01 RX ORDER — POLYETHYLENE GLYCOL 3350 17 G/17G
17 POWDER, FOR SOLUTION ORAL 2 TIMES DAILY PRN
Status: DISCONTINUED | OUTPATIENT
Start: 2023-01-01 | End: 2023-01-01 | Stop reason: HOSPADM

## 2023-01-01 RX ORDER — FLUMAZENIL 0.1 MG/ML
0.2 INJECTION, SOLUTION INTRAVENOUS
Status: DISCONTINUED | OUTPATIENT
Start: 2023-01-01 | End: 2023-01-01 | Stop reason: HOSPADM

## 2023-01-01 RX ORDER — FENTANYL CITRATE 50 UG/ML
25-50 INJECTION, SOLUTION INTRAMUSCULAR; INTRAVENOUS EVERY 5 MIN PRN
Status: DISCONTINUED | OUTPATIENT
Start: 2023-01-01 | End: 2023-01-01 | Stop reason: HOSPADM

## 2023-01-01 RX ORDER — ONDANSETRON 4 MG/1
4 TABLET, FILM COATED ORAL EVERY 8 HOURS PRN
COMMUNITY

## 2023-01-01 RX ORDER — NALOXONE HYDROCHLORIDE 0.4 MG/ML
0.2 INJECTION, SOLUTION INTRAMUSCULAR; INTRAVENOUS; SUBCUTANEOUS
Status: DISCONTINUED | OUTPATIENT
Start: 2023-01-01 | End: 2023-01-01 | Stop reason: HOSPADM

## 2023-01-01 RX ORDER — POTASSIUM CHLORIDE 1.5 G/1.58G
40 POWDER, FOR SOLUTION ORAL ONCE
Status: COMPLETED | OUTPATIENT
Start: 2023-01-01 | End: 2023-01-01

## 2023-01-01 RX ORDER — LACTULOSE 10 G/15ML
10 SOLUTION ORAL 3 TIMES DAILY
Qty: 946 ML | Refills: 0 | Status: SHIPPED | OUTPATIENT
Start: 2023-01-01

## 2023-01-01 RX ORDER — ALBUMIN (HUMAN) 12.5 G/50ML
12.5-5 SOLUTION INTRAVENOUS ONCE
Status: COMPLETED | OUTPATIENT
Start: 2023-01-01 | End: 2023-01-01

## 2023-01-01 RX ORDER — PRAMOXINE HYDROCHLORIDE 10 MG/G
AEROSOL, FOAM TOPICAL DAILY PRN
Qty: 15 G | Refills: 0 | Status: SHIPPED | OUTPATIENT
Start: 2023-01-01

## 2023-01-01 RX ORDER — LIDOCAINE HYDROCHLORIDE 10 MG/ML
10 INJECTION, SOLUTION EPIDURAL; INFILTRATION; INTRACAUDAL; PERINEURAL ONCE
Status: DISCONTINUED | OUTPATIENT
Start: 2023-01-01 | End: 2023-01-01 | Stop reason: HOSPADM

## 2023-01-01 RX ORDER — CALCIUM CARBONATE 500 MG/1
1000 TABLET, CHEWABLE ORAL 4 TIMES DAILY PRN
Status: DISCONTINUED | OUTPATIENT
Start: 2023-01-01 | End: 2023-01-01 | Stop reason: HOSPADM

## 2023-01-01 RX ORDER — FENTANYL CITRATE 50 UG/ML
INJECTION, SOLUTION INTRAMUSCULAR; INTRAVENOUS
Status: COMPLETED
Start: 2023-01-01 | End: 2023-01-01

## 2023-01-01 RX ORDER — ALBUMIN (HUMAN) 12.5 G/50ML
25-50 SOLUTION INTRAVENOUS ONCE
Status: COMPLETED | OUTPATIENT
Start: 2023-01-01 | End: 2023-01-01

## 2023-01-01 RX ORDER — POTASSIUM CHLORIDE 1.5 G/1.58G
20 POWDER, FOR SOLUTION ORAL ONCE
Status: COMPLETED | OUTPATIENT
Start: 2023-01-01 | End: 2023-01-01

## 2023-01-01 RX ORDER — LACTULOSE 10 G/15ML
10 SOLUTION ORAL 3 TIMES DAILY
Status: DISCONTINUED | OUTPATIENT
Start: 2023-01-01 | End: 2023-01-01 | Stop reason: HOSPADM

## 2023-01-01 RX ORDER — OXYCODONE HYDROCHLORIDE 5 MG/1
5 TABLET ORAL
Status: COMPLETED | OUTPATIENT
Start: 2023-01-01 | End: 2023-01-01

## 2023-01-01 RX ORDER — PROCHLORPERAZINE 25 MG
25 SUPPOSITORY, RECTAL RECTAL EVERY 12 HOURS PRN
Status: DISCONTINUED | OUTPATIENT
Start: 2023-01-01 | End: 2023-01-01 | Stop reason: HOSPADM

## 2023-01-01 RX ORDER — ALBUMIN (HUMAN) 12.5 G/50ML
25 SOLUTION INTRAVENOUS ONCE
Status: DISCONTINUED | OUTPATIENT
Start: 2023-01-01 | End: 2023-01-01 | Stop reason: HOSPADM

## 2023-01-01 RX ORDER — LACTULOSE 10 G/15ML
10 SOLUTION ORAL ONCE
Status: COMPLETED | OUTPATIENT
Start: 2023-01-01 | End: 2023-01-01

## 2023-01-01 RX ORDER — PRAMOXINE HYDROCHLORIDE 10 MG/ML
LOTION TOPICAL 3 TIMES DAILY
Status: DISCONTINUED | OUTPATIENT
Start: 2023-01-01 | End: 2023-01-01 | Stop reason: ALTCHOICE

## 2023-01-01 RX ORDER — PROCHLORPERAZINE MALEATE 5 MG
10 TABLET ORAL EVERY 6 HOURS PRN
Status: DISCONTINUED | OUTPATIENT
Start: 2023-01-01 | End: 2023-01-01 | Stop reason: HOSPADM

## 2023-01-01 RX ORDER — HYDROXYZINE HYDROCHLORIDE 25 MG/1
25 TABLET, FILM COATED ORAL 2 TIMES DAILY PRN
COMMUNITY

## 2023-01-01 RX ORDER — OXYCODONE HYDROCHLORIDE 5 MG/1
5 TABLET ORAL EVERY 6 HOURS PRN
Status: DISCONTINUED | OUTPATIENT
Start: 2023-01-01 | End: 2023-01-01 | Stop reason: HOSPADM

## 2023-01-01 RX ORDER — LIDOCAINE HYDROCHLORIDE 10 MG/ML
INJECTION, SOLUTION EPIDURAL; INFILTRATION; INTRACAUDAL; PERINEURAL
Status: COMPLETED
Start: 2023-01-01 | End: 2023-01-01

## 2023-01-01 RX ORDER — ONDANSETRON 4 MG/1
4 TABLET, ORALLY DISINTEGRATING ORAL EVERY 6 HOURS PRN
Status: DISCONTINUED | OUTPATIENT
Start: 2023-01-01 | End: 2023-01-01 | Stop reason: HOSPADM

## 2023-01-01 RX ORDER — AMOXICILLIN 250 MG
1 CAPSULE ORAL 2 TIMES DAILY PRN
Status: DISCONTINUED | OUTPATIENT
Start: 2023-01-01 | End: 2023-01-01 | Stop reason: HOSPADM

## 2023-01-01 RX ORDER — POTASSIUM CHLORIDE 1500 MG/1
20 TABLET, EXTENDED RELEASE ORAL DAILY PRN
Status: ON HOLD | COMMUNITY
End: 2023-01-01

## 2023-01-01 RX ORDER — GADOBUTROL 604.72 MG/ML
4.5 INJECTION INTRAVENOUS ONCE
Status: COMPLETED | OUTPATIENT
Start: 2023-01-01 | End: 2023-01-01

## 2023-01-01 RX ORDER — CEFAZOLIN SODIUM 2 G/100ML
2 INJECTION, SOLUTION INTRAVENOUS
Status: COMPLETED | OUTPATIENT
Start: 2023-01-01 | End: 2023-01-01

## 2023-01-01 RX ADMIN — POTASSIUM CHLORIDE 40 MEQ: 1.5 POWDER, FOR SOLUTION ORAL at 10:56

## 2023-01-01 RX ADMIN — LIDOCAINE HYDROCHLORIDE 10 ML: 10 SOLUTION INTRAVENOUS at 10:29

## 2023-01-01 RX ADMIN — ALBUMIN HUMAN 25 G: 0.25 SOLUTION INTRAVENOUS at 15:20

## 2023-01-01 RX ADMIN — SODIUM CHLORIDE: 9 INJECTION, SOLUTION INTRAVENOUS at 00:50

## 2023-01-01 RX ADMIN — CAMPHOR AND MENTHOL: 5; 5 LOTION TOPICAL at 09:02

## 2023-01-01 RX ADMIN — ACYCLOVIR SODIUM 450 MG: 50 INJECTION, SOLUTION INTRAVENOUS at 10:55

## 2023-01-01 RX ADMIN — LIDOCAINE HYDROCHLORIDE 10 ML: 10 INJECTION, SOLUTION EPIDURAL; INFILTRATION; INTRACAUDAL; PERINEURAL at 10:53

## 2023-01-01 RX ADMIN — ACYCLOVIR SODIUM 450 MG: 50 INJECTION, SOLUTION INTRAVENOUS at 09:00

## 2023-01-01 RX ADMIN — GABAPENTIN 100 MG: 100 CAPSULE ORAL at 08:41

## 2023-01-01 RX ADMIN — MIDAZOLAM HYDROCHLORIDE 1 MG: 1 INJECTION, SOLUTION INTRAMUSCULAR; INTRAVENOUS at 10:11

## 2023-01-01 RX ADMIN — OXYCODONE HYDROCHLORIDE 5 MG: 5 TABLET ORAL at 02:35

## 2023-01-01 RX ADMIN — LACTULOSE 10 G: 20 SOLUTION ORAL at 12:49

## 2023-01-01 RX ADMIN — RIFAXIMIN 200 MG: 200 TABLET ORAL at 21:53

## 2023-01-01 RX ADMIN — ALBUMIN HUMAN 12.5 G: 0.25 SOLUTION INTRAVENOUS at 12:07

## 2023-01-01 RX ADMIN — OXYCODONE HYDROCHLORIDE 5 MG: 5 TABLET ORAL at 19:31

## 2023-01-01 RX ADMIN — ALBUMIN HUMAN 25 G: 0.25 SOLUTION INTRAVENOUS at 14:46

## 2023-01-01 RX ADMIN — LACTULOSE 10 G: 20 SOLUTION ORAL at 21:13

## 2023-01-01 RX ADMIN — ALBUMIN HUMAN 12.5 G: 0.25 SOLUTION INTRAVENOUS at 11:44

## 2023-01-01 RX ADMIN — LACTULOSE 20 G: 20 SOLUTION ORAL at 01:38

## 2023-01-01 RX ADMIN — LIDOCAINE HYDROCHLORIDE 10 ML: 10 INJECTION, SOLUTION EPIDURAL; INFILTRATION; INTRACAUDAL; PERINEURAL at 17:02

## 2023-01-01 RX ADMIN — ALBUMIN HUMAN 33 G: 0.25 SOLUTION INTRAVENOUS at 15:14

## 2023-01-01 RX ADMIN — LIDOCAINE HYDROCHLORIDE 10 ML: 10 INJECTION, SOLUTION EPIDURAL; INFILTRATION; INTRACAUDAL; PERINEURAL at 13:07

## 2023-01-01 RX ADMIN — CEFAZOLIN SODIUM 2 G: 2 INJECTION, SOLUTION INTRAVENOUS at 10:07

## 2023-01-01 RX ADMIN — OXYCODONE HYDROCHLORIDE 5 MG: 5 TABLET ORAL at 17:08

## 2023-01-01 RX ADMIN — ALBUMIN HUMAN 25 G: 0.25 SOLUTION INTRAVENOUS at 15:10

## 2023-01-01 RX ADMIN — ACYCLOVIR SODIUM 450 MG: 50 INJECTION, SOLUTION INTRAVENOUS at 18:05

## 2023-01-01 RX ADMIN — LIDOCAINE HYDROCHLORIDE 10 ML: 10 INJECTION, SOLUTION EPIDURAL; INFILTRATION; INTRACAUDAL; PERINEURAL at 11:32

## 2023-01-01 RX ADMIN — LIDOCAINE HYDROCHLORIDE 8 ML: 10 INJECTION, SOLUTION EPIDURAL; INFILTRATION; INTRACAUDAL; PERINEURAL at 10:25

## 2023-01-01 RX ADMIN — RIFAXIMIN 200 MG: 200 TABLET ORAL at 20:36

## 2023-01-01 RX ADMIN — GABAPENTIN 100 MG: 100 CAPSULE ORAL at 21:13

## 2023-01-01 RX ADMIN — ALBUMIN HUMAN 50 G: 0.25 SOLUTION INTRAVENOUS at 13:40

## 2023-01-01 RX ADMIN — LACTULOSE 10 G: 20 SOLUTION ORAL at 10:08

## 2023-01-01 RX ADMIN — GABAPENTIN 100 MG: 100 CAPSULE ORAL at 09:00

## 2023-01-01 RX ADMIN — ONDANSETRON 4 MG: 2 INJECTION INTRAMUSCULAR; INTRAVENOUS at 17:02

## 2023-01-01 RX ADMIN — LIDOCAINE HYDROCHLORIDE 10 ML: 10 INJECTION, SOLUTION EPIDURAL; INFILTRATION; INTRACAUDAL; PERINEURAL at 14:20

## 2023-01-01 RX ADMIN — ACYCLOVIR SODIUM 450 MG: 50 INJECTION, SOLUTION INTRAVENOUS at 01:45

## 2023-01-01 RX ADMIN — RIFAXIMIN 200 MG: 200 TABLET ORAL at 08:41

## 2023-01-01 RX ADMIN — LACTULOSE 10 G: 20 SOLUTION ORAL at 09:00

## 2023-01-01 RX ADMIN — ALBUMIN HUMAN 25 G: 0.25 SOLUTION INTRAVENOUS at 15:01

## 2023-01-01 RX ADMIN — ALBUMIN HUMAN 12.5 G: 0.25 SOLUTION INTRAVENOUS at 11:09

## 2023-01-01 RX ADMIN — GABAPENTIN 100 MG: 100 CAPSULE ORAL at 10:08

## 2023-01-01 RX ADMIN — ALBUMIN HUMAN 25 G: 0.25 SOLUTION INTRAVENOUS at 13:54

## 2023-01-01 RX ADMIN — LACTULOSE 10 G: 20 SOLUTION ORAL at 08:40

## 2023-01-01 RX ADMIN — FENTANYL CITRATE 25 MCG: 50 INJECTION, SOLUTION INTRAMUSCULAR; INTRAVENOUS at 10:09

## 2023-01-01 RX ADMIN — CAMPHOR AND MENTHOL: 5; 5 LOTION TOPICAL at 15:22

## 2023-01-01 RX ADMIN — POTASSIUM CHLORIDE 20 MEQ: 1.5 POWDER, FOR SOLUTION ORAL at 12:37

## 2023-01-01 RX ADMIN — CAMPHOR AND MENTHOL: 5; 5 LOTION TOPICAL at 12:27

## 2023-01-01 RX ADMIN — LACTULOSE 20 G: 20 SOLUTION ORAL at 03:58

## 2023-01-01 RX ADMIN — LACTULOSE 10 G: 20 SOLUTION ORAL at 22:15

## 2023-01-01 RX ADMIN — LACTULOSE 10 G: 20 SOLUTION ORAL at 16:52

## 2023-01-01 RX ADMIN — RIFAXIMIN 200 MG: 200 TABLET ORAL at 09:02

## 2023-01-01 RX ADMIN — GABAPENTIN 100 MG: 100 CAPSULE ORAL at 17:47

## 2023-01-01 RX ADMIN — LIDOCAINE HYDROCHLORIDE 10 ML: 10 INJECTION, SOLUTION EPIDURAL; INFILTRATION; INTRACAUDAL; PERINEURAL at 10:01

## 2023-01-01 RX ADMIN — GABAPENTIN 100 MG: 100 CAPSULE ORAL at 16:17

## 2023-01-01 RX ADMIN — LACTULOSE 10 G: 20 SOLUTION ORAL at 19:43

## 2023-01-01 RX ADMIN — IOPAMIDOL 100 ML: 755 INJECTION, SOLUTION INTRAVENOUS at 20:16

## 2023-01-01 RX ADMIN — ALBUMIN HUMAN 12.5 G: 0.25 SOLUTION INTRAVENOUS at 10:49

## 2023-01-01 RX ADMIN — MIDAZOLAM HYDROCHLORIDE 1 MG: 1 INJECTION, SOLUTION INTRAMUSCULAR; INTRAVENOUS at 10:09

## 2023-01-01 RX ADMIN — ALBUMIN HUMAN 25 G: 0.25 SOLUTION INTRAVENOUS at 14:55

## 2023-01-01 RX ADMIN — RIFAXIMIN 200 MG: 200 TABLET ORAL at 09:00

## 2023-01-01 RX ADMIN — LACTULOSE 10 G: 20 SOLUTION ORAL at 17:47

## 2023-01-01 RX ADMIN — ACYCLOVIR SODIUM 450 MG: 50 INJECTION, SOLUTION INTRAVENOUS at 16:53

## 2023-01-01 RX ADMIN — LIDOCAINE HYDROCHLORIDE 8 ML: 10 INJECTION, SOLUTION EPIDURAL; INFILTRATION; INTRACAUDAL; PERINEURAL at 12:56

## 2023-01-01 RX ADMIN — GABAPENTIN 100 MG: 100 CAPSULE ORAL at 22:15

## 2023-01-01 RX ADMIN — SODIUM CHLORIDE: 9 INJECTION, SOLUTION INTRAVENOUS at 10:52

## 2023-01-01 RX ADMIN — ACYCLOVIR SODIUM 450 MG: 50 INJECTION, SOLUTION INTRAVENOUS at 10:08

## 2023-01-01 RX ADMIN — ACYCLOVIR SODIUM 450 MG: 50 INJECTION, SOLUTION INTRAVENOUS at 00:55

## 2023-01-01 RX ADMIN — GABAPENTIN 100 MG: 100 CAPSULE ORAL at 09:02

## 2023-01-01 RX ADMIN — SODIUM CHLORIDE: 9 INJECTION, SOLUTION INTRAVENOUS at 13:41

## 2023-01-01 RX ADMIN — CAMPHOR AND MENTHOL: 5; 5 LOTION TOPICAL at 22:19

## 2023-01-01 RX ADMIN — OXYCODONE HYDROCHLORIDE 5 MG: 5 TABLET ORAL at 00:35

## 2023-01-01 RX ADMIN — CAMPHOR AND MENTHOL: 5; 5 LOTION TOPICAL at 21:20

## 2023-01-01 RX ADMIN — ALBUMIN HUMAN 25 G: 0.25 SOLUTION INTRAVENOUS at 11:09

## 2023-01-01 RX ADMIN — ACYCLOVIR SODIUM 450 MG: 50 INJECTION, SOLUTION INTRAVENOUS at 17:01

## 2023-01-01 RX ADMIN — ACYCLOVIR SODIUM 450 MG: 50 INJECTION, SOLUTION INTRAVENOUS at 08:43

## 2023-01-01 RX ADMIN — CAMPHOR AND MENTHOL: 5; 5 LOTION TOPICAL at 21:50

## 2023-01-01 RX ADMIN — LACTULOSE 10 G: 20 SOLUTION ORAL at 16:17

## 2023-01-01 RX ADMIN — OXYCODONE HYDROCHLORIDE 5 MG: 5 TABLET ORAL at 09:30

## 2023-01-01 RX ADMIN — CAMPHOR AND MENTHOL: 5; 5 LOTION TOPICAL at 16:18

## 2023-01-01 RX ADMIN — GADOBUTROL 4.5 ML: 604.72 INJECTION INTRAVENOUS at 22:48

## 2023-01-01 RX ADMIN — RIFAXIMIN 200 MG: 200 TABLET ORAL at 10:08

## 2023-01-01 RX ADMIN — MAGNESIUM SULFATE HEPTAHYDRATE 2 G: 2 INJECTION, SOLUTION INTRAVENOUS at 10:52

## 2023-01-01 RX ADMIN — ACYCLOVIR SODIUM 450 MG: 50 INJECTION, SOLUTION INTRAVENOUS at 00:35

## 2023-01-01 RX ADMIN — ALBUMIN HUMAN 25 G: 0.25 SOLUTION INTRAVENOUS at 14:29

## 2023-01-01 RX ADMIN — ACYCLOVIR SODIUM 450 MG: 50 INJECTION, SOLUTION INTRAVENOUS at 01:56

## 2023-01-01 RX ADMIN — RIFAXIMIN 200 MG: 200 TABLET ORAL at 21:13

## 2023-01-01 RX ADMIN — ALBUMIN HUMAN 12.5 G: 0.25 SOLUTION INTRAVENOUS at 11:02

## 2023-01-01 RX ADMIN — GABAPENTIN 100 MG: 100 CAPSULE ORAL at 16:52

## 2023-01-01 RX ADMIN — ALBUMIN HUMAN 25 G: 0.25 SOLUTION INTRAVENOUS at 14:53

## 2023-01-01 RX ADMIN — LIDOCAINE HYDROCHLORIDE 10 ML: 10 INJECTION, SOLUTION EPIDURAL; INFILTRATION; INTRACAUDAL; PERINEURAL at 12:57

## 2023-01-01 RX ADMIN — GABAPENTIN 100 MG: 100 CAPSULE ORAL at 19:31

## 2023-01-01 RX ADMIN — ACYCLOVIR SODIUM 450 MG: 50 INJECTION, SOLUTION INTRAVENOUS at 17:43

## 2023-01-01 RX ADMIN — LACTULOSE 10 G: 20 SOLUTION ORAL at 21:53

## 2023-01-01 RX ADMIN — LIDOCAINE HYDROCHLORIDE,EPINEPHRINE BITARTRATE 9 ML: 10; .01 INJECTION, SOLUTION INFILTRATION; PERINEURAL at 10:26

## 2023-01-01 RX ADMIN — LIDOCAINE HYDROCHLORIDE 10 ML: 10 INJECTION, SOLUTION EPIDURAL; INFILTRATION; INTRACAUDAL; PERINEURAL at 10:29

## 2023-01-01 RX ADMIN — GABAPENTIN 100 MG: 100 CAPSULE ORAL at 21:53

## 2023-01-01 RX ADMIN — ALBUMIN HUMAN 12.5 G: 0.25 SOLUTION INTRAVENOUS at 13:14

## 2023-01-01 RX ADMIN — CAMPHOR AND MENTHOL: 5; 5 LOTION TOPICAL at 08:42

## 2023-01-01 RX ADMIN — CAMPHOR AND MENTHOL: 5; 5 LOTION TOPICAL at 10:08

## 2023-01-01 RX ADMIN — LACTULOSE 10 G: 20 SOLUTION ORAL at 09:02

## 2023-01-01 ASSESSMENT — ACTIVITIES OF DAILY LIVING (ADL)
ADLS_ACUITY_SCORE: 52
ADLS_ACUITY_SCORE: 35
ADLS_ACUITY_SCORE: 52
ADLS_ACUITY_SCORE: 35
ADLS_ACUITY_SCORE: 52
ADLS_ACUITY_SCORE: 37
ADLS_ACUITY_SCORE: 43
ADLS_ACUITY_SCORE: 52
ADLS_ACUITY_SCORE: 52
ADLS_ACUITY_SCORE: 50
ADLS_ACUITY_SCORE: 35
ADLS_ACUITY_SCORE: 52
ADLS_ACUITY_SCORE: 35
ADLS_ACUITY_SCORE: 52
ADLS_ACUITY_SCORE: 47
ADLS_ACUITY_SCORE: 50
ADLS_ACUITY_SCORE: 35
ADLS_ACUITY_SCORE: 50
ADLS_ACUITY_SCORE: 52
ADLS_ACUITY_SCORE: 50
ADLS_ACUITY_SCORE: 52
ADLS_ACUITY_SCORE: 35
ADLS_ACUITY_SCORE: 50
ADLS_ACUITY_SCORE: 50
ADLS_ACUITY_SCORE: 35
ADLS_ACUITY_SCORE: 35
ADLS_ACUITY_SCORE: 52
ADLS_ACUITY_SCORE: 47
DEPENDENT_IADLS:: CLEANING;COOKING;LAUNDRY;SHOPPING;MEAL PREPARATION;MEDICATION MANAGEMENT;MONEY MANAGEMENT;TRANSPORTATION
ADLS_ACUITY_SCORE: 47
ADLS_ACUITY_SCORE: 52
ADLS_ACUITY_SCORE: 35
ADLS_ACUITY_SCORE: 52
ADLS_ACUITY_SCORE: 52
ADLS_ACUITY_SCORE: 35
ADLS_ACUITY_SCORE: 50
ADLS_ACUITY_SCORE: 35
ADLS_ACUITY_SCORE: 43
ADLS_ACUITY_SCORE: 52
ADLS_ACUITY_SCORE: 50
ADLS_ACUITY_SCORE: 52
ADLS_ACUITY_SCORE: 50
ADLS_ACUITY_SCORE: 47
ADLS_ACUITY_SCORE: 47
ADLS_ACUITY_SCORE: 52
ADLS_ACUITY_SCORE: 52
ADLS_ACUITY_SCORE: 47
ADLS_ACUITY_SCORE: 52
ADLS_ACUITY_SCORE: 50
ADLS_ACUITY_SCORE: 35
ADLS_ACUITY_SCORE: 52

## 2023-03-27 NOTE — IR NOTE
Pt tolerated albumin without complications. Pt left stable with her daughter   no fever and no chills.

## 2023-08-25 NOTE — PROGRESS NOTES
2.7 L removed for paracentesis. Refused albumin as didn't want to be poked again for small amount of fluid.    Vanessa Vega, RN

## 2023-09-07 NOTE — DISCHARGE INSTRUCTIONS

## 2023-09-07 NOTE — PROGRESS NOTES
Admission Note:  Reason for admission: paracentesis  Time of arrival: 1030    Accompanied by: self  Name/phone of discontinue : NA    Pre-procedure assessment complete: Yes  If abnormal assessment/labs, provider notified: N/A    Medications held per instructions/orders: Yes  Procedure explained. All questions & concerns addressed: Yes  Consent: obtained    Discharge instructions reviewed: Yes  Patient verbalizes understanding: Yes    Paracentesis:   Patient tolerated well. VSS. 6500 ml cloudy yellow fluid removed from abdomen w/o difficulty. Bandaid applied to site - CDI.   Albumin given per protocol / standing orders.  Pt develops some patchy redness around IV site after albumin started, no infiltration noted, warm pack her arm, pt feels better after warm pack, slow down rate.    Discharge Note:  Discharge criteria met and vital signs stable: Yes  ~~ 1245 Patient discharged per ambulatory to private vehicle. All personal belongings taken with patient.

## 2023-09-14 NOTE — PROGRESS NOTES
Care Suites Procedure Nursing Note    Patient Information  Name: Lori Jean-Baptiste  Age: 57 year old    Procedure: Paracentesis: patient arrived to department. Ultrasound tech performed preliminary scan to find pockets of fluid. MD arrived to explain procedure, obtained consent. Time out was then done. Procedure begun, no issues, drainage in process. Patient was monitored with BP and O2 sats. Patient tolerated well. VSS. 6700 cc anushka fluid removed from abdomen w/o difficulty. Bandaid applied to site. Albumin given per special order.     Procedure start time: 1040  Procedure complete time: 1122  Concerns/abnormal assessment: No  If abnormal assessment, provider notified: N/A  Plan/Other: discontinue PIV and discharge to home.    Meme Yusuf RN

## 2023-09-14 NOTE — DISCHARGE INSTRUCTIONS

## 2023-09-19 NOTE — PROGRESS NOTES
Provider notification:  Called and spoke with Dr. Gaspar's patient coordinator, Jade.  Updated Jade that Lori did not get her albumin per protocol d/t unable to obtain IV access (attempted x 3). Offered to call IV team or attempt one other time, Lori declined.  Jade stated she would update Dr. Gaspar.

## 2023-09-19 NOTE — PROGRESS NOTES
Care Suites Procedure Nursing Note    Patient Information  Name: Lori Jean-Baptiste  Age: 57 year old    Procedure  Procedure: Paracentesis  Procedure start time: 1345  Called to start IV for 5.4 liters of clear yellow fluid that was removed.  Procedure complete time: 1406  Concerns/abnormal assessment: Unable to obtain IV access.  Attempted x 3.  Offered to call IV team to access.  Pt refused so albumin not given per protocol.  If abnormal assessment, provider notified: Yes  Plan/Other: Discharge to [facility] via .  All belongings sent with Lori.    Susan Teague RN

## 2023-09-28 NOTE — ED PROVIDER NOTES
History     Chief Complaint:  Abdomen Distention & Leg Edema      HPI   Lori Jean-Baptiset is a 57 year old female with history of anxiety, alcoholic cirrhosis with ascites, stroke, hyperlipidemia, and HTN who presents to the ED with worsening abdominal distention and leg edema. Patient's family member states that she typically gets a paracentesis 2 times per week but she did not make an appointment this week due to memory issues. Her last paracentesis was over 1 week ago. Now she is having worsening fluid build up and is concerned about ascites. She reports generalized abdominal discomfort and distension. She says she has been having a hard time getting around recently due to the increased fluid. Lori also endorses nausea and vomiting, which is a chronic issue that she typically experiences with her ascites. No fevers or other associated symptoms.  Last paracentesis-9/14/23. No SOB.    Recent Echo:Complete Echo Adult. Poor acoustic windows.   ______________________________________________________________________________   Interpretation Summary      Technically challenging secondary to suboptimal parasternal windows.   Normal left ventricular size. Left ventricular systolic function appears   normal. The visually estimated ejection fraction is 60 to 65%. No observed   regional wall motion abnormality.   Normal right ventricular size and systolic function.   No observed hemodynamically significant valve abnormality.   Mild tricuspid regurgitation. Estimate of RV systolic pressure is 16 mmHg plus   right atrial pressure   Trace pericardial effusion overlying the right ventricular apex   ______________________________________________________________________________   Left Ventricle   The left ventricle is normal in size. Left ventricular systolic function is   normal. Diastolic Doppler findings (E/E' ratio and/or other parameters)   suggest left ventricular filling pressures are normal. The visual ejection    fraction is 60-65%. No regional wall motion abnormalities noted.      Right Ventricle   Normal right ventricle size and systolic function. TAPSE is normal, which is   consistent with normal right ventricular systolic function.      Atria   Normal left atrial size. Right atrial size is normal.      Mitral Valve   Mitral valve leaflets appear normal. There is trace mitral regurgitation.      Tricuspid Valve   The tricuspid valve is not well visualized. The tricuspid valve is not well   visualized, but is grossly normal. There is mild (1+) tricuspid regurgitation.   The right ventricular systolic pressure is approximated at 16.2 mmHg plus the   right atrial pressure.      Aortic Valve   The aortic valve is not well visualized. The aortic valve is trileaflet with   aortic valve sclerosis. No hemodynamically significant valvular aortic   stenosis.      Pulmonic Valve   The pulmonic valve is not well visualized. There is trace pulmonic valvular   regurgitation.      Vessels   The aorta root cannot be assessed. The proximal aorta is suboptimally   visualized on this examination. IVC diameter <2.1 cm collapsing >50% with   sniff suggests a normal RA pressure of 3 mmHg.      Pericardium   Trivial pericardial effusion.       Independent Historian:   Family - They report additional history as noted above.     Review of External Notes:        Medications:    Lasix  Toprol   Aldactone   Vistaril Lopressor   Ativan  Desyrel   Zofran   K-Gretel  Mag-Ox    Past Medical History:    Thrombocytopenia   HTN  Hyperlipidemia   Splenomegaly   Alcoholic cirrhosis of liver with ascites   Intussusception of small bowel  Coagulopathy   GI hemorrhage   Hepatitis C, chronic   Erosive esophagitis   Anxiety   UTI  Insomnia   Hydronephrosis   C. Difficile infection  MDD  Postconcussion syndrome   Peripheral venous insuffiencey  Liver failure   Memory loss   Chronic post-traumatic headache   Portal HTN   Hemorrhoids   Drug dependence, in remission    Stroke     Past Surgical History:    EGD x2  Laparoscopic cholecystectomy   Forearm fracture repair with hardware, left   Lower arm surgery, right    Physical Exam   Patient Vitals for the past 24 hrs:   BP Temp Temp src Pulse Resp SpO2   23 1338 -- -- -- -- -- 100 %   23 1321 92/64 -- -- 74 20 100 %   23 0937 108/84 (!) 96.7  F (35.9  C) Temporal 75 18 100 %        Physical Exam  Vitals and nursing note reviewed.   Constitutional:       Appearance: She is not diaphoretic.   HENT:      Mouth/Throat:      Mouth: Mucous membranes are moist.      Pharynx: Oropharynx is clear.   Eyes:      General: No scleral icterus.     Conjunctiva/sclera: Conjunctivae normal.      Pupils: Pupils are equal, round, and reactive to light.   Cardiovascular:      Rate and Rhythm: Normal rate and regular rhythm.      Heart sounds: Normal heart sounds, S1 normal and S2 normal. No murmur heard.     No friction rub. No gallop.   Pulmonary:      Effort: Pulmonary effort is normal. No respiratory distress.      Breath sounds: Normal breath sounds. No stridor. No wheezing, rhonchi or rales.   Chest:      Chest wall: No tenderness.   Abdominal:      General: There is distension.      Tenderness: There is abdominal tenderness (mild generalized). There is no guarding.   Musculoskeletal:      Right lower le+ Edema present.      Left lower le+ Edema present.   Skin:     Coloration: Skin is not jaundiced or pale.   Neurological:      Mental Status: She is alert and oriented to person, place, and time. Mental status is at baseline.   Psychiatric:         Attention and Perception: Attention and perception normal.         Mood and Affect: Mood and affect normal.         Behavior: Behavior normal.         Thought Content: Thought content normal.         Emergency Department Course     Imaging:  US Paracentesis without Albumin   Final Result   IMPRESSION: Technically successful paracentesis without immediate   complications.       MANUELA VALENCIA DO            SYSTEM ID:  [id number]         Report per radiology    Laboratory:  Labs Ordered and Resulted from Time of ED Arrival to Time of ED Departure   BASIC METABOLIC PANEL - Abnormal       Result Value    Sodium 133 (*)     Potassium 4.0      Chloride 98      Carbon Dioxide (CO2) 26      Anion Gap 9      Urea Nitrogen 10.9      Creatinine 0.79      GFR Estimate 88      Calcium 8.0 (*)     Glucose 103 (*)    CBC WITH PLATELETS AND DIFFERENTIAL - Abnormal    WBC Count 5.0      RBC Count 3.23 (*)     Hemoglobin 11.2 (*)     Hematocrit 32.7 (*)      (*)     MCH 34.7 (*)     MCHC 34.3      RDW 14.6      Platelet Count 93 (*)     % Neutrophils 73      % Lymphocytes 14      % Monocytes 12      % Eosinophils 1      % Basophils 0      % Immature Granulocytes 0      NRBCs per 100 WBC 0      Absolute Neutrophils 3.6      Absolute Lymphocytes 0.7 (*)     Absolute Monocytes 0.6      Absolute Eosinophils 0.0      Absolute Basophils 0.0      Absolute Immature Granulocytes 0.0      Absolute NRBCs 0.0     INR - Normal    INR 1.08          Emergency Department Course & Assessments:       Interventions:  Medications   lidocaine 1 % 10 mL (10 mLs Subcutaneous $Given by Other 9/28/23 1257)        Assessments:  1052 I obtained history and examined the patient as noted above.  1335 I rechecked the patient and explained findings. Patient is no longer distended and feels a lot better. Her abdomen is soft and she is comfortable with discharge at this time.     Independent Interpretation (X-rays, CTs, rhythm strip):  None    Consultations/Discussion of Management or Tests:  None   2  Social Determinants of Health affecting care:   None    Disposition:  The patient was discharged to home.     Impression & Plan    CMS Diagnoses: None    Medical Decision Making:  This is a 57-year-old female with chronic underlying ascites from history of cirrhosis.  Patient generally obtains biweekly paracentesis however she  missed her last paracentesis and has not had one since the 19th.  She presents with increased abdominal distention bloating and discomfort.  About 900 mils were removed from her paracentesis today.  Patient's repeat exam had a benign abdomen without pain, distention or tenderness.  Patient reports he feels much better.  Patient is not dizzy or lightheaded.  At this time the patient would like to discharge home as she is feeling much better I think this is reasonable.  Patient does not want to stay in the ED any longer.  Low concern for spontaneous bacterial infection patient is afebrile does not have a white count.  Lab values are consistent with baseline.  She will keep her next appointment for her paracentesis and follow-up with her primary care and managing GI doctor.  Return precautions were discussed including worsening symptoms or developing fevers or worsening condition.      Diagnosis:    ICD-10-CM    1. Other ascites  R18.8            Scribe Disclosure:  PAYAL, Lea Morel, am serving as a scribe at 10:59 AM on 9/28/2023 to document services personally performed by Zain Andrea PA-C based on my observations and the provider's statements to me.   9/28/2023   Zain Andrea PA-C Kruger, Jacob C, PA-C  09/28/23 1912

## 2023-09-28 NOTE — PROGRESS NOTES
Paracentesis completed per Dr. Ortiz for 6600 ml clear anushka fluid, patient tolerated well. Transferred to room via cart in stable condition.

## 2023-10-06 NOTE — IR NOTE
Patient tolerated paracentesis with albumin with no concerns expressed or observed.    6.9 L removed with 25 grams albumin given per order.

## 2023-10-18 NOTE — PROGRESS NOTES
6.3liters of yellow fluid out.  Site CDI.  Albumin replacement per written protocol from [facility].  Patient verbalizes understanding of discharge instructions.  No further questions at this time.

## 2023-10-24 NOTE — PROGRESS NOTES
Admission Note:  Reason for admission: Paracentesis  Time of arrival: 1045    Accompanied by: self  Name/phone of discontinue : n/a    Pre-procedure assessment complete: Yes  If abnormal assessment/labs, provider notified: N/A    Medications held per instructions/orders: N/A  Procedure explained. All questions & concerns addressed: Yes  Consent: obtained    Discharge instructions reviewed: Yes  Patient verbalizes understanding: Yes    Paracentesis:   Patient tolerated well. VSS. 7550 cc [id number] fluid removed from abdomen w/o difficulty. Bandaid applied to site - CDI.   Albumin given per protocol / standing orders.    Discharge Note:  Discharge criteria met and vital signs stable: Yes    1100: Patient discharged per ambulatory to private vehicle. All personal belongings taken with patient.

## 2023-10-30 NOTE — PROGRESS NOTES
Patient Information  Name: Lori Jean-Baptiste  Age: 58 year old  Reason for admission: Paracentesis with albumin    Paracentesis: Pt tolerated well. VSS. 8150 cc yellow fluid removed from abdomen w/o difficulty. Bandaid applied to site - CDI. Albumin administered per written order - see MAR.    Discharge Plans:   Discharge location: home  Discharge ride contacted: Yes  Approximate discharge time: 1130    Discharge Criteria:  Discharge criteria met and vital signs stable: Yes    Patient Belongs:  Patient belongings returned to patient: Yes    Cary Ag RN

## 2023-10-31 NOTE — PROGRESS NOTES
[facility]    Background: Transitional Care Management program identified per system criteria and reviewed by [facility] team for possible outreach.    Assessment: Upon chart review, [facility] Team member will not proceed with patient outreach related to this episode of Transitional Care Management program due to reason below:    Patient has a follow up appointment with an appropriate provider today for hospital discharge. On my way to pre op so can't talk need both hands to drive.     Plan: Transitional Care Management episode addressed appropriately per reason noted above.      Sharon Cid MA  [facility]    *Connected Care Resource Team does NOT follow patient ongoing. Referrals are identified based on internal discharge reports and the outreach is to ensure patient has an understanding of their discharge instructions.

## 2023-11-14 NOTE — PROCEDURES
[facility]    Procedure: Paracentesis.    Date/Time: 11/14/2023 4:51 PM    Performed by: Lupis Ortiz DO  Authorized by: Lupis Ortiz DO      UNIVERSAL PROTOCOL   Site Marked: Yes  Prior Images Obtained and Reviewed:  Yes  Required items: Required blood products, implants, devices and special equipment available    Patient identity confirmed:  Verbally with patient, arm band, provided demographic data and hospital-assigned identification number  Patient was reevaluated immediately before administering moderate or deep sedation or anesthesia  Confirmation Checklist:  Patient's identity using two indicators, relevant allergies, procedure was appropriate and matched the consent or emergent situation and correct equipment/implants were available  Time out: Immediately prior to the procedure a time out was called    Universal Protocol: the Joint Commission Universal Protocol was followed    Preparation: Patient was prepped and draped in usual sterile fashion       ANESTHESIA    Anesthesia: Local infiltration  Local Anesthetic:  Lidocaine 1% without epinephrine      SEDATION    Patient Sedated: No    See dictated procedure note for full details.  Findings: paracentesis    Specimens: none    Complications: None    Condition: Stable      PROCEDURE    Patient Tolerance:  Patient tolerated the procedure well with no immediate complications  Length of time physician/provider present for 1:1 monitoring during sedation: 0

## 2023-11-14 NOTE — PROGRESS NOTES
Patient tolerated Paracentesis well.  4500 mL of anushka/reddish fluid drained. Done by Dr. Ortiz. Dressing applied with no drainage.  No albumin given, order parameters not met. No specimens sent to lab, no labs ordered at this time Patient states understanding  instructions. Pt left unit in stable condition. Report called to ED RN.

## 2023-11-14 NOTE — ED PROVIDER NOTES
History     Chief Complaint:  No chief complaint on file.       HPI   Lori Jean-Baptiste is a 58 year old female with history of hypertension, stroke, alcoholic liver disease, cirrhosis, esophageal varices, and more who presents with chief complaint abdominal swelling and lower extremity swelling.  She reports she feels like she needs a paracentesis. The last time she had one done was during her TIPS procedure that was done recently at [place]. She reports taking diuretics currently.     Of note, the patient tried to get in for a paracentesis and the soonest she could get in was on 12/21/23.    Independent Historian:    None    Review of External Notes:  Reviewed notes from [facility] surrounding TIPS procedure performed on 11/8/2023.    Medications:    Buspar  Vistaril  Chronulac  Ativan  Lopressor  Zyprexa  Zofran  Protonix  Desyrel  Catapres    Past Medical History:    Alcohol abuse  Cellulitis  Drug dependence  Hemorrhoids  Hyperlipidemia  Colon polyp  Depression  Alcoholic cirrhosis  Erosive esophagitis  Hypertension  Thrombocytopenia  UGI bleeding  Anxiety  Stroke    Past Surgical History:    EGD x2  Laparoscopic cholecystectomy  Left forearm fracture with hardware   TIPS    Physical Exam   Patient Vitals for the past 24 hrs:   BP Temp Temp src Pulse Resp SpO2   11/14/23 1701 94/64 -- -- 91 -- 100 %   11/14/23 1700 92/62 -- -- 90 -- 100 %   11/14/23 1543 -- -- -- -- -- 100 %   11/14/23 1541 111/67 -- -- 96 -- --   11/14/23 1208 117/75 98  F (36.7  C) Temporal 94 18 100 %      Physical Exam    Head:  The scalp, face, and head appear normal  Eyes:  Conjunctivae are normal  ENT:    The nose is normal    Pinnae are normal  Neck:  Trachea midline  CV:  Normal rate.  Bilateral lower extremity pitting edema.  Resp:  No respiratory distress   GI:  Firm, distended, nontender  Musc:  Normal muscular tone  Skin:  No rash or lesions noted  Neuro:  Speech is normal and fluent. Face is symmetric. Moving all  extremities well.             Emergency Department Course     Imaging:  US Paracentesis without Albumin   Final Result   IMPRESSION:   1.  Status post ultrasound-guided paracentesis.      Reference CPT Code: 15799        Report per radiology    Laboratory:  Labs Ordered and Resulted from Time of ED Arrival to Time of ED Departure   COMPREHENSIVE METABOLIC PANEL - Abnormal       Result Value    Sodium 137      Potassium 3.4      Carbon Dioxide (CO2) 27      Anion Gap 10      Urea Nitrogen 10.0      Creatinine 0.78      GFR Estimate 89      Calcium 8.1 (*)     Chloride 100      Glucose 103 (*)     Alkaline Phosphatase 129      AST 61 (*)     ALT 38      Protein Total 5.6 (*)     Albumin 2.9 (*)     Bilirubin Total 1.9 (*)    ROUTINE UA WITH MICROSCOPIC REFLEX TO CULTURE - Abnormal    Color Urine Straw      Appearance Urine Clear      Glucose Urine Negative      Bilirubin Urine Negative      Ketones Urine Negative      Specific Gravity Urine 1.005      Blood Urine Negative      pH Urine 7.0      Protein Albumin Urine Negative      Urobilinogen Urine Normal      Nitrite Urine Negative      Leukocyte Esterase Urine Negative      Bacteria Urine Few (*)     Mucus Urine Present (*)     RBC Urine 1      WBC Urine <1      Hyaline Casts Urine 1     CBC WITH PLATELETS AND DIFFERENTIAL - Abnormal    WBC Count 4.5      RBC Count 3.14 (*)     Hemoglobin 10.4 (*)     Hematocrit 31.3 (*)           MCH 33.1 (*)     MCHC 33.2      RDW 15.2 (*)     Platelet Count 64 (*)     % Neutrophils 76      % Lymphocytes 11      % Monocytes 11      % Eosinophils 1      % Basophils 0      % Immature Granulocytes 1      NRBCs per 100 WBC 0      Absolute Neutrophils 3.4      Absolute Lymphocytes 0.5 (*)     Absolute Monocytes 0.5      Absolute Eosinophils 0.1      Absolute Basophils 0.0      Absolute Immature Granulocytes 0.0      Absolute NRBCs 0.0     AMMONIA - Normal    Ammonia 45        Emergency Department Course & Assessments:        Interventions:  Medications   albumin human 25 % injection 25-50 g ( Intravenous Not Given 11/14/23 1702)   lidocaine 1 % 1-30 mL (10 mLs Subcutaneous $Given 11/14/23 1702)      Assessments:  1533 I obtained history and examined the patient as noted above.   1740 I rechecked and updated the patient. I deemed the patient safe to discharge home.    Independent Interpretation (X-rays, CTs, rhythm strip):  None     Consultations/Discussion of Management or Tests:  1556 I spoke with Dr. Ortiz from IR about the patient's presentation, findings, and plan of care. The patient will be able to have a paracentesis done today.        Social Determinants of Health affecting care:  Healthcare Access/Compliance and Transportation     Disposition:  The patient was discharged to home.     Impression & Plan      Medical Decision Making:  Patient presents with chief complaint abdominal distention and lower extremity swelling.  She reports need for paracentesis.  She tried to have this scheduled through [facility] and was not told she could have one scheduled until December 21.  She presents to our emergency department for hope to get this done.  She is not hypoxic.  She is not having significant abdominal pain.  Labs are not concerning in regard to emergent electrolyte derangement or liver failure.  I discussed with on-call interventional radiology, Dr. Ortiz, and she reports she is able to get this done for patient today.  4.5 L were removed, therefore no albumin was needed.  I instructed patient to discuss further paracenteses with her liver team.  She was instructed to return for fever, worsening pain, vomiting, evidence of infection, leaking peritoneal fluid, or for any other concerns. She is in stable condition at the time of discharge, red flags that should merit ED return were discussed as well as recommended follow-up instructions. All questions were answered and she is in agreement with the plan.      Diagnosis:     ICD-10-CM    1. Alcoholic cirrhosis of liver with ascites (H)  K70.31            Scribe Disclosure:  I, Art Neal, am serving as a scribe at 3:28 PM on 11/14/2023 to document services personally performed by John Evans MD based on my observations and the provider's statements to me.             John Evans MD  11/16/23 1014

## 2023-11-14 NOTE — ED TRIAGE NOTES
Pt presents to ED with LE swelling and abdominal swelling. Had a TIPS procedure on the 8th and forgot to schedule follow up. Unable to get in until the 21st for paracentesis. Per pt family, pt takes lactulose and has been taking as prescribed, but feels the patient is a little more off and confused than usual. C/O pain to feet and legs as well as ribs due to the swelling.

## 2023-11-14 NOTE — DISCHARGE INSTRUCTIONS
Call your liver team tomorrow to come up with plan when you need your next paracentesis.     Return to emergency department for new/worsening symptoms, fever, draining fluid from your abdomen, bleeding, or for any other concerns.    Report received, assumed care, agree with previous assessment, no distress noted. Will cont to monitor

## 2023-12-08 PROBLEM — K76.82 HEPATIC ENCEPHALOPATHY (H): Status: ACTIVE | Noted: 2023-01-01

## 2023-12-08 NOTE — ED NOTES
[facility]  ED Nurse Handoff Report    ED Chief complaint: Multiple Complaints  . ED Diagnosis:   Final diagnoses:   None       Allergies: No Known Allergies    Code Status: Full Code    Activity level - Baseline/Home:  assist of 1.  Activity Level - Current:   assist of 1.   Lift room needed: No.   Bariatric: No   Needed: No   Isolation: Yes. contact  Infection: getting treated for shingles  Other .     Respiratory status: Room air    Vital Signs (within 30 minutes):   Vitals:    12/08/23 0813 12/08/23 0930 12/08/23 1210 12/08/23 1230   BP: 121/72 121/68 131/78 111/67   Pulse: 111 103 100 100   Resp: 20 20  16   Temp: 98  F (36.7  C)      SpO2: 100% 100% 100% 98%       Cardiac Rhythm:  ,      Pain level:    Patient confused: Yes.   Patient Falls Risk: nonskid shoes/slippers when out of bed, arm band in place, patient and family education, and activity supervised.   Elimination Status: Has voided     Patient Report - Initial Complaint: Patient presents with complaints of diarrhea which has bene ongoing since before  Thanksgiving. Patient was seen for this at [facility], which she left AMA. Patient also fell 3 times last night. Patient has been taking Ativan for pain and family believe this is why age is falling. Of note, patient is also currently on treatment for shingles as well. . ABC intact without need for intervention at this time   Focused Assessment: Pt is somnolent, arouses with verbal and tactile stimulation. Takes ativan at home. Confused, alert to self only. High fall risk. Taking meds for shingles, rash to mid/upper back, abdomen, Pt scratches at sites frequently. Contact precautions due to shingles. Pt c/o pain to Left upper arm and abdomen and back. Pt is very weak with getting up out of bed, frequent episodes of diarrhea.    Abnormal Results:   Labs Ordered and Resulted from Time of ED Arrival to Time of ED Departure   COMPREHENSIVE METABOLIC PANEL - Abnormal        Result Value    Sodium 138      Potassium 3.2 (*)     Carbon Dioxide (CO2) 23      Anion Gap 10      Urea Nitrogen 5.4 (*)     Creatinine 0.50 (*)     GFR Estimate >90      Calcium 7.6 (*)     Chloride 105      Glucose 120 (*)     Alkaline Phosphatase 143      AST 63 (*)     ALT 32      Protein Total 5.7 (*)     Albumin 2.6 (*)     Bilirubin Total 2.1 (*)    LIPASE - Abnormal    Lipase 66 (*)    CBC WITH PLATELETS AND DIFFERENTIAL - Abnormal    WBC Count 6.2      RBC Count 3.10 (*)     Hemoglobin 10.5 (*)     Hematocrit 31.1 (*)           MCH 33.9 (*)     MCHC 33.8      RDW 19.9 (*)     Platelet Count 70 (*)     % Neutrophils 74      % Lymphocytes 13      % Monocytes 10      % Eosinophils 1      % Basophils 1      % Immature Granulocytes 1      NRBCs per 100 WBC 1 (*)     Absolute Neutrophils 4.7      Absolute Lymphocytes 0.8      Absolute Monocytes 0.6      Absolute Eosinophils 0.0      Absolute Basophils 0.0      Absolute Immature Granulocytes 0.0      Absolute NRBCs 0.0     INR - Abnormal    INR 1.37 (*)    AMMONIA - Abnormal    Ammonia 83 (*)    MAGNESIUM - Abnormal    Magnesium 1.6 (*)    PARTIAL THROMBOPLASTIN TIME - Normal    aPTT 33     ROUTINE UA WITH MICROSCOPIC REFLEX TO CULTURE        Radius/Ulna XR,  PA &LAT, left   Final Result   IMPRESSION: Old healed radius fracture in anatomic position and   alignment with affixing hardware showing no evidence of complication.   No acute or subacute fracture. Otherwise unremarkable.      EH CHISHOLM MD            SYSTEM ID:  [id number]      Humerus XR,  G/E 2 views, left   Final Result   IMPRESSION:      Normal joint spaces and alignment. No fracture.      DEE GOVEA MD            SYSTEM ID:  [id number]      CT Head w/o Contrast   Final Result   IMPRESSION:   1.  No acute posttraumatic abnormality.   2.  Extensive burden of white matter hypodensity which may be due to   regions of demyelination. If further assessment is clinically   indicated a  contrast-enhanced head MRI is recommended.      Radiation dose for this scan was reduced using automated exposure   control, adjustment of the mA and/or kV according to patient size, or   iterative reconstruction technique      PARKER DOBBNIS MD            SYSTEM ID:  [id number]      XR Chest Port 1 View   Final Result   IMPRESSION: No pneumothorax or definite displaced rib fracture   evident. No acute infiltrates.      MOIRA PERKINS MD            SYSTEM ID:  [id number]          Treatments provided: See MAR  Family Comments: Daughter Adi #[phone]  OBS brochure/video discussed/provided to patient:  N/A  ED Medications:   Medications   sodium chloride 0.9 % infusion ( Intravenous $New Bag 12/8/23 1052)   potassium chloride (KLOR-CON) Packet 40 mEq (40 mEq Oral $Given 12/8/23 1056)   magnesium sulfate 2 g in 50 mL sterile water intermittent infusion (0 g Intravenous Stopped 12/8/23 1149)   lactulose (CHRONULAC) solution 10 g (10 g Oral $Given 12/8/23 1249)       Drips infusing:  Yes  For the majority of the shift this patient was Green.   Interventions performed were none.    Sepsis treatment initiated: No    Cares/treatment/interventions/medications to be completed following ED care: Pt will need a sitter. High fall risk. Pt attempts to get out of bed without notifying staff. Pt is confused and somnolent    ED Nurse Name: Terri Lim RN  12:52 PM

## 2023-12-08 NOTE — PHARMACY-ADMISSION MEDICATION HISTORY
"Pharmacist Admission Medication History    Admission medication history is complete. The information provided in this note is only as accurate as the sources available at the time of the update.    Information Source(s): Family member (daughter) via in-person in ER room #35.    Pertinent Information: LACTULOSE - NOT taking due to diarrhea. Not taking: clotrimazole (completed), protonix, clonidine, lasix, metoprolol (low BP), buspar, mirtazapine, olanzapine.     Changes made to PTA medication list:  Added: rifaximin, zofran, hydroxyzine, lorazepam, valtrex, lactulose, potassium  Deleted: clonidine  Changed: pramoxine gel to prn    Allergies reviewed with patient and updates made in EHR: yes (nka)    Prior to Admission medications    Medication Sig Last Dose Taking?   hydrOXYzine HCl (ATARAX) 25 MG tablet Take 25 mg by mouth 2 times daily as needed  Yes   LORazepam (ATIVAN) 0.5 MG tablet Take 0.5 mg by mouth daily as needed (paracentesis)  Yes   ondansetron (ZOFRAN) 4 MG tablet Take 4 mg by mouth every 8 hours as needed for nausea  Yes   potassium chloride ER (K-TAB) 20 MEQ CR tablet Take 20 mEq by mouth daily as needed only takes \"sometimes\" Yes   pramoxine (PROCTOFOAM) 1 % foam Place rectally 3 times daily  Patient taking differently: Place rectally daily as needed  Yes   rifaximin (XIFAXAN) 550 MG TABS tablet Take 200 mg by mouth 2 times daily  Yes   spironolactone (ALDACTONE) 50 MG tablet Take 1 tablet (50 mg) by mouth daily  Patient taking differently: Take 50 mg by mouth daily as needed  Yes   valACYclovir (VALTREX) 1000 mg tablet Take 1,000 mg by mouth 3 times daily 12/8 12 tabs left to take*  Yes   furosemide (LASIX) 20 MG tablet Take 1 tablet (20 mg) by mouth daily  Patient not taking: Reported on 12/8/2023 Not Taking    lactulose (CHRONULAC) 10 GM/15ML solution Take 20 g by mouth 3 times daily  Patient not taking: Reported on 12/8/2023 Not Taking at due to diarrhea    metoprolol succinate ER (TOPROL XL) 25 " MG 24 hr tablet Take 0.5 tablets (12.5 mg) by mouth daily  Patient not taking: Reported on 12/8/2023 Not Taking

## 2023-12-08 NOTE — ED TRIAGE NOTES
Patient presents with complaints of diarrhea which has bene ongoing since before  Thanksgiving. Patient was seen for this at [facility], which she left AMA. Patient also fell 3 times last night. Patient has been taking Ativan for pain and family believe this is why age is falling. Of note, patient is also currently on treatment for shingles as well. . ABC intact without need for intervention at this time.

## 2023-12-08 NOTE — H&P
[facility]    History and Physical - Hospitalist Service       Date of Admission:  12/8/2023    Assessment & Plan      Lori Jean-Baptiste is a 58 year old female with past medical history significant for alcohol-induced cirrhosis c/b esophageal varices s/p TIPS (11/2023), hepatic encephalopathy, chronic hepatitis C infection, intussusception, hypertension, hyperlipidemia, chronic anemia who presented to [facility] on 12/8/2023 with worsening encephalopathy and frequent diarrhea.    Acute Toxic/Metabolic Encephalopathy  Hepatic Encephalopathy  Hyperammonemia  Patient has history of hepatic encephalopathy, recently admitted to [place] for exacerbation. She was restarted on her lactulose with improvement, but left AMA. Since returning home, she has not been taking her lactulose consistently as she has had significant amounts of diarrhea otherwise. She was reportedly told to stop lactulose by her GI physician. Unfortunately, patient became more confused over a few days. Additionally, took 2 doses of lorazepam prior to paracentesis rather than 1. Suspect that somnolence/encephalopathy a combination of hepatic and toxic with prolonged clearing of benzodiazapienes in setting of chronic liver disease. Ammonia 83 on presentation. CT head does show extensive burden of white matter hypodensity which may be due to regions of demyelination; unclear if this is acute as it was not noted in prior CT at 11/2023 at outside hospital. Obtaining MRI. Also has disseminated zoster so cannot rule out other potential infectious causes. Will provide with lactulose and symptomatic treatment. If does not clear over the course of the next day, could consider neurology consult/LP.  -Restart lactulose  -Rifaxamin  -MRI BRain    Frequent Diarrhea  Likely related to liver disease, but with mildly elevated lipase could potentially indicated a component of chronic pancreatitis or malabsorption. Checking stool  studies and fecal elastase. Will request GI consultation to assist with workup and management, as even though patient has been having frequent stools it has not been enough to manage her hepatic encephalopathy.    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 This does appear mostly stable, but bilirubin 2.1 on arrival. This was 1.9 11/14/2023 (essentially same). Apparently not taking lasix, spironolactone at home. Patient reports that she is not ready for hospice to daughter, but does refuse to take medications. Will request GI assistance, but also palliative care to start discussing goals of care.    Disseminated Herpes Zoster  Vesicular rash including dermatomal distribution on both anterior and posterior thorax. Was on valganciclovir outpatient, will change to acyclovir IV inpatient. Isolation needed. Consider ID consult.    Mild Lipase Elevation: Lipase 66. No acute abdominal pain, but may have some component of chronic pancreatitis?  Hypokalemia: K 3.2; suspect due to poor oral intake and GI losses. Replete PRN.  Hypomagnesemia: Mg 1.6; suspect due to poor oral intake. Replete PRN.  Hypoalbuminemia / Total Protein Deficiency: Albumin 2.6, total protein 5.7. Suspect nutritional. Checking prealbumin.  Hypertension: Normotensive in emergency department. Monitoring and restart medications if needed.  Hyperlipidemia: Not on statin.  Chronic Anemia: At baseline 10-11.  Thrombocytopenia: Platelet 70, increase from 64 11/14/2023. Suspect due to cirrhosis. Monitoring. No evidence of bleeding.  Coagulopathy: INR 1.37 likely 2/2 poor synthetic liver function. Monitoring. No evidence of bleeding.        Diet:  NPO until patient more awake; ok for sips and ice chips if nursing deems patient awake enough to manage.  DVT Prophylaxis: Pneumatic Compression Devices  Nelson Catheter: Not present  Lines: None     Cardiac Monitoring:  None  Code Status:  Full    Clinically Significant Risk Factors Present on Admission        # Hypokalemia: Lowest K = 3.2 mmol/L in last 2 days, will replace as needed     # Hypomagnesemia: Lowest Mg = 1.6 mg/dL in last 2 days, will replace as needed   # Hypoalbuminemia: Lowest albumin = 2.6 g/dL at 12/8/2023  9:04 AM, will monitor as appropriate  # Coagulation Defect: INR = 1.37 (Ref range: 0.85 - 1.15) and/or PTT = 33 Seconds (Ref range: 22 - 38 Seconds), will monitor for bleeding  # Thrombocytopenia: Lowest platelets = 70 in last 2 days, will monitor for bleeding   # Hypertension: Noted on problem list                 Disposition Plan      Expected Discharge Date: 12/10/2023                  Nelson Hall MD  Hospitalist Service  [facility]  Securely message with [employer] (more info)  Text page via [facility] Paging/Directory     ______________________________________________________________________    Chief Complaint   Somnolence  Diarrhea    History is obtained from the patient's daughter    History of Present Illness   Lori Jean-Baptiste is a 58 year old female with past medical history significant for alcohol-induced cirrhosis c/b esophageal varices s/p TIPS (11/2023), hepatic encephalopathy, chronic hepatitis C infection, intussusception, hypertension, hyperlipidemia, chronic anemia who presented to [facility] on 12/8/2023 with increasing encephalopathy and diarrhea.    Daughter reports that patient was recently admitted to [facility] for similar, but left AMA on Thanksgiving once she was mentally improved. Since that time, patient continued to have diarrhea at home in excess of what was thought related to lactulose. She was apparently told by her GI provider to stop lactulose. Unfortunately, patient subsequently became more confused. Due to the confusion, daughter reports that she accidentally took two doses of her lorazepam that she has been prescribed to take prior to  "paracentesis. Since that time, she has reportedly had two falls at home and been very confused thus daughter brought her to the emergency department. Patient also reportedly does not take all of her prescribed medications and it is quite difficult for daughter to get her to take anything at home. However, she reportedly has stated she is \"not ready for hospice.\" Daughter requesting palliative care consultation.    IN the emergency department, patient was afebrile. She was tachycardic to low 100s. Blood pressure 110-120s/60s-70s. She had normal oxygen saturations on room air. Laboratory studies were obtained and listed as below. CT head obtained showing possible white matter demyelination. XR left forearm and humerus without acute fracture.          Past Medical History    No past medical history on file.    Past Surgical History   Past Surgical History:   Procedure Laterality Date    ESOPHAGOSCOPY, GASTROSCOPY, DUODENOSCOPY (EGD), COMBINED N/A 4/11/2017    Procedure: ESOPHAGOGASTRODUODENOSCOPY (EGD) with biopsy;  Surgeon: Jesus Woods MD;  Location: [facility];  Service:     ESOPHAGOSCOPY, GASTROSCOPY, DUODENOSCOPY (EGD), COMBINED N/A 2/8/2018    Procedure: ESOPHAGOGASTRODUODENOSCOPY (EGD);  Surgeon: Sherif Valentine MD;  Location: [facility];  Service:     IR PARACENTESIS  11/22/2023    IR PARACENTESIS  5/8/2018    LAPAROSCOPIC CHOLECYSTECTOMY N/A 3/16/2017    Procedure: LAPAROSCOPIC CHOLECYSTECTOMY, ;  Surgeon: Jesus Ramirez DO;  Location: [facility] OR;  Service:     OTHER SURGICAL HISTORY      left forearm fracturewith hardware       Prior to Admission Medications   Prior to Admission Medications   Prescriptions Last Dose Informant Patient Reported? Taking?   LORazepam (ATIVAN) 0.5 MG tablet   Yes Yes   Sig: Take 0.5 mg by mouth daily as needed (paracentesis)   furosemide (LASIX) 20 MG tablet Not Taking  No No   Sig: Take 1 tablet (20 mg) by mouth daily   Patient not taking: Reported on " "12/8/2023   hydrOXYzine HCl (ATARAX) 25 MG tablet   Yes Yes   Sig: Take 25 mg by mouth 2 times daily as needed   lactulose (CHRONULAC) 10 GM/15ML solution Not Taking at due to diarrhea  Yes No   Sig: Take 20 g by mouth 3 times daily   Patient not taking: Reported on 12/8/2023   metoprolol succinate ER (TOPROL XL) 25 MG 24 hr tablet Not Taking  No No   Sig: Take 0.5 tablets (12.5 mg) by mouth daily   Patient not taking: Reported on 12/8/2023   ondansetron (ZOFRAN) 4 MG tablet   Yes Yes   Sig: Take 4 mg by mouth every 8 hours as needed for nausea   potassium chloride ER (K-TAB) 20 MEQ CR tablet only takes \"sometimes\"  Yes Yes   Sig: Take 20 mEq by mouth daily as needed   pramoxine (PROCTOFOAM) 1 % foam   No Yes   Sig: Place rectally 3 times daily   Patient taking differently: Place rectally daily as needed   rifaximin (XIFAXAN) 550 MG TABS tablet   Yes Yes   Sig: Take 200 mg by mouth 2 times daily   spironolactone (ALDACTONE) 50 MG tablet   No Yes   Sig: Take 1 tablet (50 mg) by mouth daily   Patient taking differently: Take 50 mg by mouth daily as needed   valACYclovir (VALTREX) 1000 mg tablet   Yes Yes   Sig: Take 1,000 mg by mouth 3 times daily 12/8 12 tabs left to take*      Facility-Administered Medications: None        Physical Exam   Temp: 98  F (36.7  C)   BP: 114/70 Pulse: 103   Resp: 13 SpO2: 99 % O2 Device: None (Room air)   Height: 154.9 cm (5' 1\")    Estimated body mass index is 23.35 kg/m  as calculated from the following:    Height as of this encounter: 1.549 m (5' 1\").    Weight as of 7/4/22: 56.1 kg (123 lb 9.6 oz).    General: Very pleasant female resting comfortably in hospital bed. Sleeping. Wakes eyes to voice briefly. Able to state her name, but then falls asleep. Daughter at bedside.  HEENT: Normocephalic, atraumatic.  PERRL, EOMI.  Conjunctiva clear, sclerae anicteric.  Mucous membranes moist. Mildly jaundiced.  Cardiac: Tachycardic with normal rhythm without murmur, gallop, or rub.  2+ " peripheral edema.  Respiratory: Normal work of breathing.  Clear to auscultation bilaterally without wheezing, rales, or rhonchi.  GI: Normal, active bowel sounds.  Abdomen soft, nontender, nondistended.  : Deferred.  Musculoskeletal: Moving all extremities appropriately.  Skin: Vesicular rash to left mid-back, crusted rash with areas of prior erosions on left stomach.  Neurologic: Alert and oriented x4.  Cranial nerves II through XII grossly intact.  Psychologic: Appropriate mood and affect.      Medical Decision Making       50 MINUTES SPENT BY ME on the date of service doing chart review, history, exam, documentation & further activities per the note.      Data     I have personally reviewed the following data over the past 24 hrs:    6.2  \   10.5 (L)   / 70 (L)     138 105 5.4 (L) /  120 (H)   3.2 (L) 23 0.50 (L) \     ALT: 32 AST: 63 (H) AP: 143 TBILI: 2.1 (H)   ALB: 2.6 (L) TOT PROTEIN: 5.7 (L) LIPASE: 66 (H)     INR:  1.37 (H) PTT:  33   D-dimer:  N/A Fibrinogen:  N/A       Imaging results reviewed over the past 24 hrs:   Recent Results (from the past 24 hour(s))   XR Chest Port 1 View    Narrative    CHEST ONE VIEW  12/8/2023 10:54 AM     HISTORY: Fall    COMPARISON: None.      Impression    IMPRESSION: No pneumothorax or definite displaced rib fracture  evident. No acute infiltrates.    MOIRA PERKINS MD         SYSTEM ID:  [id number]   CT Head w/o Contrast    Narrative    CT OF THE HEAD WITHOUT CONTRAST   12/8/2023 11:23 AM     COMPARISON: None    HISTORY:  trauma     TECHNIQUE:  Axial CT images of the head from the skull base to the  vertex were acquired without IV contrast.    FINDINGS:   INTRACRANIAL CONTENTS: No intracranial hemorrhage, extraaxial  collection, or mass effect.  No CT evidence of acute infarct.  Extensive burden of supratentorial white matter hypodensity. This may  be due to regions of demyelination. Normal basal ganglia, thalami,  brainstem and cerebellum. Normal ventricles and  sulci.    VISUALIZED ORBITS/SINUSES/MASTOIDS: No significant orbital  abnormality.  No significant paranasal sinus mucosal disease. No  significant middle ear or mastoid effusion.    OSSEOUS STRUCTURES/SOFT TISSUES: No significant abnormality.      Impression    IMPRESSION:  1.  No acute posttraumatic abnormality.  2.  Extensive burden of white matter hypodensity which may be due to  regions of demyelination. If further assessment is clinically  indicated a contrast-enhanced head MRI is recommended.    Radiation dose for this scan was reduced using automated exposure  control, adjustment of the mA and/or kV according to patient size, or  iterative reconstruction technique    PARKER DOBBINS MD         SYSTEM ID:  [id number]   Humerus XR,  G/E 2 views, left    Narrative    XR HUMERUS LEFT G/E 2 VIEWS 12/8/2023 11:32 AM     HISTORY: Trauma. Pain.    COMPARISON: None.       Impression    IMPRESSION:    Normal joint spaces and alignment. No fracture.    DEE GOVEA MD         SYSTEM ID:  [id number]   Radius/Ulna XR,  PA &LAT, left    Narrative    FOREARM LEFT 2 VIEWS   12/8/2023 11:33 AM     HISTORY: Trauma, pain.    COMPARISON: None.      Impression    IMPRESSION: Old healed radius fracture in anatomic position and  alignment with affixing hardware showing no evidence of complication.  No acute or subacute fracture. Otherwise unremarkable.    EH CHISHOLM MD         SYSTEM ID:  [id number]

## 2023-12-08 NOTE — ED PROVIDER NOTES
History     Chief Complaint:  Multiple Complaints       The history is provided by the patient and a relative.      Lori Jean-Baptiste is a 58 year old female with a history of hepatic encephalopathy and liver cirrhosis, who presents to the ED with ongoing diarrhea since before Thanksgiving and for falling 3 times. Denies drinking alcohol. Reports chest pain yesterday.        Independent Historian:   Daughter shaji patient was seen at [place] on 11/21/2023 for her diarrhea and was hospitalized until 11/27/2023. Reports patient was seen by GI and was told diarrhea was due to her hepatic encephalopathy and reported a negative C.diff test. GI warned them that if her diarrhea continues, she will have kidney failure. Reports patient had a paracentesis 2 days ago and was prescribed 5 doses of lorazepam for upcoming paracenteses. As this was the first pain medications she was prescribed since the start of her symptoms, the patient took 2 doses of lorazepam yesterday. Reports she fell on the floor twice yesterday and again this morning at 0600.   Daughter reports patient has not been eating and drinking much all week. Reports she has taken her Xifaxin and acyclovir most of the week. Reports patient was treated for shingles this week.     Review of External Notes:   [facility] discharge summary reviewed from November 24, 2023 when the patient was seen for hepatic encephalopathy.  She ultimately left AGAINST MEDICAL ADVICE.      Medications:    Lasix  Toprol  Aldactone  Lopressor  Protonix    Past Medical History:    Livor cirrhosis  Hepatic encephalopathy     Past Surgical History:    EGD x 2  Paracentesis x 2  Cholecystectomy  Left forearm fracture with hardware    Physical Exam   Patient Vitals for the past 24 hrs:   BP Temp Pulse Resp SpO2   12/08/23 1338 -- -- -- -- 99 %   12/08/23 1337 -- -- -- -- 99 %   12/08/23 1335 -- -- -- -- 99 %   12/08/23 1334 114/68 -- 100 -- 99 %   12/08/23 1300 116/69 -- 97 -- --   12/08/23  1230 111/67 -- 100 16 98 %   12/08/23 1210 131/78 -- 100 -- 100 %   12/08/23 0930 121/68 -- 103 20 100 %   12/08/23 0813 121/72 98  F (36.7  C) 111 20 100 %        Physical Exam  Constitutional:       General: She is in acute distress.      Appearance: Normal appearance. She is not diaphoretic.      Comments: Cachectic   HENT:      Head: Atraumatic.      Right Ear: External ear normal.      Left Ear: External ear normal.      Mouth/Throat:      Mouth: Mucous membranes are dry.   Eyes:      General: No scleral icterus.     Conjunctiva/sclera: Conjunctivae normal.      Pupils: Pupils are equal, round, and reactive to light.   Cardiovascular:      Rate and Rhythm: Normal rate.      Heart sounds: Normal heart sounds.   Pulmonary:      Effort: No respiratory distress.      Breath sounds: Normal breath sounds.   Abdominal:      General: Abdomen is flat.   Musculoskeletal:      Cervical back: Neck supple.      Right lower leg: No edema.      Left lower leg: No edema.      Comments: Vesicular rash on an erythematous base in a dermatomal distribution across the left chest wall.   Skin:     General: Skin is warm.      Capillary Refill: Capillary refill takes less than 2 seconds.      Findings: No rash.      Comments: Scattered ecchymosis.   Neurological:      Mental Status: She is alert.      Comments: Oriented only to person.  Strength grossly intact.  No facial asymmetry.  Mumbling speech at times.  No asterixis.   Psychiatric:      Comments: Cooperative           Emergency Department Course   ECG  ECG taken at 1000, ECG read at 1010  Normal sinus rhythm. Prolonged QT. Abnormal ECG.    Prolonged QT as compared to prior, dated 11/14/23.  Rate 100 bpm. WA interval 166 ms. QRS duration 70 ms. QT/QTc 402/518 ms. P-R-T axes 72 42 64.     Imaging:  Radius/Ulna XR,  PA &LAT, left   Final Result   IMPRESSION: Old healed radius fracture in anatomic position and   alignment with affixing hardware showing no evidence of complication.    No acute or subacute fracture. Otherwise unremarkable.      EH CHISHOLM MD            SYSTEM ID:  [id number]      Humerus XR,  G/E 2 views, left   Final Result   IMPRESSION:      Normal joint spaces and alignment. No fracture.      DEE GOVEA MD            SYSTEM ID:  [id number]      CT Head w/o Contrast   Final Result   IMPRESSION:   1.  No acute posttraumatic abnormality.   2.  Extensive burden of white matter hypodensity which may be due to   regions of demyelination. If further assessment is clinically   indicated a contrast-enhanced head MRI is recommended.      Radiation dose for this scan was reduced using automated exposure   control, adjustment of the mA and/or kV according to patient size, or   iterative reconstruction technique      PARKER DOBBINS MD            SYSTEM ID:  [id number]      XR Chest Port 1 View   Final Result   IMPRESSION: No pneumothorax or definite displaced rib fracture   evident. No acute infiltrates.      MOIRA PERKINS MD            SYSTEM ID:  [id number]         Laboratory:  Labs Ordered and Resulted from Time of ED Arrival to Time of ED Departure   COMPREHENSIVE METABOLIC PANEL - Abnormal       Result Value    Sodium 138      Potassium 3.2 (*)     Carbon Dioxide (CO2) 23      Anion Gap 10      Urea Nitrogen 5.4 (*)     Creatinine 0.50 (*)     GFR Estimate >90      Calcium 7.6 (*)     Chloride 105      Glucose 120 (*)     Alkaline Phosphatase 143      AST 63 (*)     ALT 32      Protein Total 5.7 (*)     Albumin 2.6 (*)     Bilirubin Total 2.1 (*)    LIPASE - Abnormal    Lipase 66 (*)    CBC WITH PLATELETS AND DIFFERENTIAL - Abnormal    WBC Count 6.2      RBC Count 3.10 (*)     Hemoglobin 10.5 (*)     Hematocrit 31.1 (*)           MCH 33.9 (*)     MCHC 33.8      RDW 19.9 (*)     Platelet Count 70 (*)     % Neutrophils 74      % Lymphocytes 13      % Monocytes 10      % Eosinophils 1      % Basophils 1      % Immature Granulocytes 1      NRBCs per 100 WBC 1 (*)      Absolute Neutrophils 4.7      Absolute Lymphocytes 0.8      Absolute Monocytes 0.6      Absolute Eosinophils 0.0      Absolute Basophils 0.0      Absolute Immature Granulocytes 0.0      Absolute NRBCs 0.0     INR - Abnormal    INR 1.37 (*)    AMMONIA - Abnormal    Ammonia 83 (*)    MAGNESIUM - Abnormal    Magnesium 1.6 (*)    PARTIAL THROMBOPLASTIN TIME - Normal    aPTT 33     ROUTINE UA WITH MICROSCOPIC REFLEX TO CULTURE      Emergency Department Course & Assessments:         Interventions:  Medications   sodium chloride 0.9 % infusion ( Intravenous $New Bag 12/8/23 1052)   potassium chloride (KLOR-CON) Packet 40 mEq (40 mEq Oral $Given 12/8/23 1056)   magnesium sulfate 2 g in 50 mL sterile water intermittent infusion (0 g Intravenous Stopped 12/8/23 1149)   lactulose (CHRONULAC) solution 10 g (10 g Oral $Given 12/8/23 1249)        Assessments:  0928 I obtained history and examined the patient as noted above.  1200 I rechecked and updated the patient.    Independent Interpretation (X-rays, CTs, rhythm strip):  Chest x-ray independently interpreted.  No pneumonia.    Consultations/Discussion of Management or Tests:  1343 I spoke with Dr. Hall, of the hospitalist team, regarding the patient. They will accept the patient for admission.          Social Determinants of Health affecting care:   Lives with her family who is not currently able to care for her.    Disposition:  The patient was admitted to the hospital under the care of Dr. Hall.     Impression & Plan      Medical Decision Making:  This patient is a 58-year-old who presents to the emergency department for evaluation of confusion.  She appears to have worsening hepatic encephalopathy with elevated ammonia levels.  This is consistent with the remainder of her agitation.  She is hypomagnesemic, hypokalemic, and slightly prolonged QT interval.  Her electrolytes were replaced and she is being hydrated.  She remains overall stable.    It is not clear if the  patient has been getting her rifaximin and lactulose.  She has been having loose stools and has been reluctant to take them.  She was given lactulose here.  She will be admitted to the hospital service.    The patient is currently being treated with acyclovir for shingles.  There does not appear to be any evidence of disseminated shingles or cellulitis secondarily.      Diagnosis:    ICD-10-CM    1. Hepatic encephalopathy (H)  K76.82       2. Hypomagnesemia  E83.42       3. Hypokalemia  E87.6       4. QT prolongation  R94.31       5. Herpes zoster without complication  B02.9                Scribe Disclosure:  I, Paola Bishop, am serving as a scribe at 10:53 AM on 12/8/2023 to document services personally performed by Javan Mead MD* based on my observations and the provider's statements to me.   12/8/2023   Javan Mead MD McRoberts, Sean Edward, MD  12/08/23 3044

## 2023-12-08 NOTE — ED NOTES
"[facility]    ED Boarding Nurse Handoff Addendum Report:    Date/time: 12/8/2023, 5:30 PM    Activity Level: stand by assist of 1    Fall Risk: Yes:  nonskid shoes/slippers when out of bed, arm band in place, patient and family education, assistive device/personal items within reach, and activity supervised    Active Infusions: NSS at 100 ml/hr Acyclovir IVF     Current Meds Due: see MAR    Current care needs: per plan of care, stool sample, urine sample    Oxygen requirements (liters/min and/or FiO2): none    Respiratory status: Room air    Vital signs (within last 30 minutes):    Vitals:    12/08/23 1430 12/08/23 1500 12/08/23 1537 12/08/23 1714   BP: 125/75 104/68 114/70    Pulse: 108 104 103    Resp:   13    Temp:       SpO2:   99%    Weight:    44.3 kg (97 lb 10.6 oz)   Height:   1.549 m (5' 1\")        Focused assessment within last 30 minutes:    Patient is drowsy, but when awake able to make needs known. Daughter at bedside. Assisted with getting weight.    ED Boarding Nurse name: Myah Garcia RN   "

## 2023-12-09 NOTE — PROVIDER NOTIFICATION
DATE/TIME OF CALL RECEIVED FROM LAB:  12/09/23 at 7:38 AM   LAB TEST:  Ammonia  LAB VALUE:  122  PROVIDER NOTIFIED?: Yes  PROVIDER NAME: MD Isabel  DATE/TIME LAB VALUE REPORTED TO PROVIDER: 12/9/23 0738  MECHANISM OF PROVIDER NOTIFICATION: Page  PROVIDER RESPONSE: Continue PoC

## 2023-12-09 NOTE — PLAN OF CARE
"Shift : [id number]  /73 (BP Location: Left arm)   Pulse 107   Temp 97.9  F (36.6  C) (Oral)   Resp 20   Ht 1.549 m (5' 1\")   Wt 44.3 kg (97 lb 10.6 oz)   SpO2 99%   BMI 18.45 kg/m        Shift Summary; Occurences, Discussions & Interventions of Note : Pt admittied to [facility] this evening 1800. Pt oriented to self. Family at bedside. Family report confusion is much improved. Pt is Assist of viviane Mcleod.   Pt has scabbed over shingles that cause her significant pain, managed with 1 time oral oxycodone. Effective.   Pt admitted with frequent diahrea. Pt reports difficult to pass stool / hard stool. I ask for supository, which is ordered PRN. I let the pt rest with their pain managed.  Pt went down to MRI. Not back to unit at time of RN handoff.   Pt received lactulose.     Assessment Findings of Note : Pt pale and jaundiced. Confused. Shallow breathing.     Plan : Continue lactulose treatment.       Goal Outcome Evaluation:      Plan of Care Reviewed With: patient    Overall Patient Progress: improvingOverall Patient Progress: improving           "

## 2023-12-09 NOTE — PLAN OF CARE
"/79 (BP Location: Left arm)   Pulse 119   Temp 98.9  F (37.2  C) (Oral)   Resp 18   Ht 1.549 m (5' 1\")   Wt 44.3 kg (97 lb 10.6 oz)   SpO2 97%   BMI 18.45 kg/m      VSS, PT denies SoB/CP. Increased confusion this AM, decreased in the afternoon. Several stools, still retaining hard stool. C/o abd discomfort/nausea, given zofran with relief. Palliative consult, plan to transition to comfort measures.   "

## 2023-12-09 NOTE — PLAN OF CARE
VSS. Pt Lethargic but became more alert throughout the night. Ammonia level 154- Lactulose given x2. IVF's infusing. NPO. LS clear. Trace edema. Incontinent of BM. Bladder scanned for >400 ml. Straight cath'd for 325 ml. Assist x2 to bedside commode. IV Acyclovir. GI/ID and palliative consulted.

## 2023-12-09 NOTE — PROGRESS NOTES
Cross cover:    Adding gabapentin 100 mg 3 times daily and a one-time dose of 5 mg oxycodone for uncontrolled zoster/shingles pain.     Ovi Wiggins MD

## 2023-12-09 NOTE — PROVIDER NOTIFICATION
Pt bladder scanned for >400 ml. Pt very lethargic. MD paged for orders.    Straight cath orders placed. Labs and Lactulose ordered.

## 2023-12-09 NOTE — CONSULTS
[facility]    Infectious Disease Consultation     Date of Admission:  12/8/2023  Date of Consult (When I saw the patient): 12/09/23    Assessment & Plan   Lori Jean-Baptiste is a 58 year old who was admitted on 12/8/2023.     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 Agree with acyclovir   2. Consider LP and send for meningitis/ encephalitis panel     Bert Levy MD    Reason for Consult   Reason for consult: I was asked to evaluate this patient for concern for disseminated zoster.    Primary Care Physician   Mehnaz Gaspar MD    Chief Complaint   Confusion     History is obtained from the patient and medical records    History of Present Illness   Lori Jean-Baptiste is a 58 year old female who presents with confusion also found to have a rash     Past Medical History   I have reviewed this patient's medical history and updated it with pertinent information if needed.   No past medical history on file.    Past Surgical History   I have reviewed this patient's surgical history and updated it with pertinent information if needed.  Past Surgical History:   Procedure Laterality Date    ESOPHAGOSCOPY, GASTROSCOPY, DUODENOSCOPY (EGD), COMBINED N/A 4/11/2017    Procedure: ESOPHAGOGASTRODUODENOSCOPY (EGD) with biopsy;  Surgeon: Jesus Woods MD;  Location: [facility];  Service:     ESOPHAGOSCOPY, GASTROSCOPY, DUODENOSCOPY (EGD), COMBINED N/A 2/8/2018    Procedure: ESOPHAGOGASTRODUODENOSCOPY (EGD);  Surgeon: Sherif Valentine MD;  Location: [facility];  Service:     IR PARACENTESIS   "11/22/2023    IR PARACENTESIS  5/8/2018    LAPAROSCOPIC CHOLECYSTECTOMY N/A 3/16/2017    Procedure: LAPAROSCOPIC CHOLECYSTECTOMY, ;  Surgeon: Jesus Ramirez DO;  Location: [facility];  Service:     OTHER SURGICAL HISTORY      left forearm fracturewith hardware       Prior to Admission Medications   Prior to Admission Medications   Prescriptions Last Dose Informant Patient Reported? Taking?   LORazepam (ATIVAN) 0.5 MG tablet   Yes Yes   Sig: Take 0.5 mg by mouth daily as needed (paracentesis)   furosemide (LASIX) 20 MG tablet Not Taking  No No   Sig: Take 1 tablet (20 mg) by mouth daily   Patient not taking: Reported on 12/8/2023   hydrOXYzine HCl (ATARAX) 25 MG tablet   Yes Yes   Sig: Take 25 mg by mouth 2 times daily as needed   lactulose (CHRONULAC) 10 GM/15ML solution Not Taking at due to diarrhea  Yes No   Sig: Take 20 g by mouth 3 times daily   Patient not taking: Reported on 12/8/2023   metoprolol succinate ER (TOPROL XL) 25 MG 24 hr tablet Not Taking  No No   Sig: Take 0.5 tablets (12.5 mg) by mouth daily   Patient not taking: Reported on 12/8/2023   ondansetron (ZOFRAN) 4 MG tablet   Yes Yes   Sig: Take 4 mg by mouth every 8 hours as needed for nausea   potassium chloride ER (K-TAB) 20 MEQ CR tablet only takes \"sometimes\"  Yes Yes   Sig: Take 20 mEq by mouth daily as needed   pramoxine (PROCTOFOAM) 1 % foam   No Yes   Sig: Place rectally 3 times daily   Patient taking differently: Place rectally daily as needed   rifaximin (XIFAXAN) 550 MG TABS tablet   Yes Yes   Sig: Take 200 mg by mouth 2 times daily   spironolactone (ALDACTONE) 50 MG tablet   No Yes   Sig: Take 1 tablet (50 mg) by mouth daily   Patient taking differently: Take 50 mg by mouth daily as needed   valACYclovir (VALTREX) 1000 mg tablet   Yes Yes   Sig: Take 1,000 mg by mouth 3 times daily 12/8 12 tabs left to take*      Facility-Administered Medications: None     Allergies   No Known Allergies    Immunization History   Immunization " "History   Administered Date(s) Administered    COVID-19 Bivalent 12+ (Pfizer) 10/19/2022    COVID-19 MONOVALENT 12+ (Pfizer) 04/27/2021, 05/18/2021    COVID-19 Monovalent 12+ (Pfizer 2022) 02/07/2022       Social History   I have reviewed this patient's social history and updated it with pertinent information if needed. Lori Jean-Baptiste  reports that she has been smoking cigarettes and cigarettes. She has never used smokeless tobacco. She reports that she does not drink alcohol and does not use drugs.    Family History   I have reviewed this patient's family history and updated it with pertinent information if needed.   Family History   Problem Relation Age of Onset    Lung Cancer Father        Review of Systems   The 10 point Review of Systems is negative    Physical Exam   Temp: 97.4  F (36.3  C) Temp src: Oral BP: 131/75 Pulse: 113   Resp: 16 SpO2: 98 % O2 Device: None (Room air)    Vital Signs with Ranges  Temp:  [97.4  F (36.3  C)-97.9  F (36.6  C)] 97.4  F (36.3  C)  Pulse:  [] 113  Resp:  [12-20] 16  BP: (104-131)/(67-78) 131/75  SpO2:  [98 %-100 %] 98 %  97 lbs 10.62 oz  Body mass index is 18.45 kg/m .    GENERAL APPEARANCE:  awake  EYES: Eyes grossly normal to inspection  NECK: no adenopathy  RESP: lungs clear   CV: regular rates and rhythm  LYMPHATICS: normal ant/post cervical and supraclavicular nodes  ABDOMEN: soft, nontender  MS: extremities normal  SKIN: scabbed over dermatomal rash on the back         Data   All laboratory and imaging data in the past 24 hours reviewed  No results for input(s): \"CULT\" in the last 168 hours.  No lab results found.    Invalid input(s): \"UC\"       All cultures:  No results for input(s): \"CULTURE\" in the last 168 hours.   Blood culture:  Results for orders placed or performed during the hospital encounter of 07/02/22   Blood Culture Peripheral Blood    Specimen: Peripheral Blood   Result Value Ref Range    Culture No Growth    Blood Culture Hand, Right    " Specimen: Hand, Right; Blood   Result Value Ref Range    Culture No Growth       Urine culture:  Results for orders placed or performed in visit on 08/11/21   Urine Culture    Specimen: Urine, NOS   Result Value Ref Range    Culture >100,000 CFU/mL Klebsiella aerogenes (A)        Susceptibility    Klebsiella aerogenes - LAVON     Ampicillin/ Sulbactam*  Resistant       * Intrinsically Resistant     Ampicillin* >16 Resistant ug/mL      * Intrinsically Resistant     Cefazolin* >16 Resistant ug/mL      * Intrinsically Resistant     Cefepime <=1 Susceptible ug/mL     Ceftriaxone <=1 Susceptible ug/mL     Ciprofloxacin <=0.25 Susceptible ug/mL     Gentamicin <=2 Susceptible ug/mL     Levofloxacin <=0.5 Susceptible ug/mL     Meropenem <=0.5 Susceptible ug/mL     Tetracycline <=2 Susceptible ug/mL     Tobramycin <=2 Susceptible ug/mL     Nitrofurantoin >64 Resistant ug/mL     Trimethoprim/Sulfamethoxazole <=0.5 Susceptible ug/mL

## 2023-12-09 NOTE — CONSULTS
[facility] GASTROENTEROLOGY CONSULTATION     Lori Jean-Baptiste  [address] [address] [facility] [address]  58 year old female    Admission Date/Time: 12/8/2023  Primary Care Provider: Mehnaz Gaspar    We were asked to see the patient in consultation by Dr. Hall for evaluation of hepatic encephalopathy, end stage liver disease.        HPI:  Lori Jean-Baptiste is a 58 year old female who is well known to [facility] from [facility].  She was admitted to [facility] 12/8/23 with diarrhea, pain from Shingles and a fall at home.      Patient's past medical history is significant for decompensated liver cirrhosis which has included encephalopathy and diuretic resistant ascites. Patient was initially diagnosed with liver cirrhosis in 2018 when she had an elective cholecystectomy had persistent drainage of ascites from her surgical site. At that time she was also found to have hepatitis-C she was also drinking alcohol regularly at that time. She stopped drinking for a short time and then hepatitis-C was treated with Epclusa.     Unfortunately patient had multiple relapses to alcohol in the interim. Most recently in May 2023 when she was admitted to the hospital with a blood alcohol level of 0.273. Since that time she is mostly been living with her daughter. She is been eating better and has been a bit irregular about taking her medications. In August of 2023 the patient had noticeably worsened to sarcopenia, worsening ascites and worsening lower extremity edema.     Patient has been taking rifaximin and lactulose due to encephalopathy although she is not always been regular about taking these medications.     Patient underwent a TIPS procedure on November 10th for persistent abdominal diuretic refractory ascites. She did have a hospital admission last week for encephalopathy. She had not been taking her Xifaxan.     In the past 2 weeks patient has been diagnosed with thrush and shingles.  She has been having a lot of pain  from her shingles and has been unable to sleep. She has been having increasing loose stools as well.    She is currently living with her daughter, caring for patient at home has become increasingly exhausting for the family as patient is unable to sleep well, requires frequent monitoring, needs help with cares, eating and medication.  On the day prior to admission patient had taken 2 ativan (normally used for anxiety for paracentesis) to help her sleep. Her lactulose was stopped several days prior to admission due to profuse diarrhea.    ROS: A comprehensive ten point review of systems was negative aside from those in mentioned in the HPI.      MEDICATIONS: No current facility-administered medications on file prior to encounter.  hydrOXYzine HCl (ATARAX) 25 MG tablet, Take 25 mg by mouth 2 times daily as needed  LORazepam (ATIVAN) 0.5 MG tablet, Take 0.5 mg by mouth daily as needed (paracentesis)  ondansetron (ZOFRAN) 4 MG tablet, Take 4 mg by mouth every 8 hours as needed for nausea  potassium chloride ER (K-TAB) 20 MEQ CR tablet, Take 20 mEq by mouth daily as needed  pramoxine (PROCTOFOAM) 1 % foam, Place rectally 3 times daily (Patient taking differently: Place rectally daily as needed)  rifaximin (XIFAXAN) 550 MG TABS tablet, Take 200 mg by mouth 2 times daily  spironolactone (ALDACTONE) 50 MG tablet, Take 1 tablet (50 mg) by mouth daily (Patient taking differently: Take 50 mg by mouth daily as needed)  valACYclovir (VALTREX) 1000 mg tablet, Take 1,000 mg by mouth 3 times daily 12/8 12 tabs left to take*  furosemide (LASIX) 20 MG tablet, Take 1 tablet (20 mg) by mouth daily (Patient not taking: Reported on 12/8/2023)  lactulose (CHRONULAC) 10 GM/15ML solution, Take 20 g by mouth 3 times daily (Patient not taking: Reported on 12/8/2023)  metoprolol succinate ER (TOPROL XL) 25 MG 24 hr tablet, Take 0.5 tablets (12.5 mg) by mouth daily (Patient not taking: Reported on 12/8/2023)        ALLERGIES: No Known  "Allergies    No past medical history on file.    Past Surgical History:   Procedure Laterality Date    ESOPHAGOSCOPY, GASTROSCOPY, DUODENOSCOPY (EGD), COMBINED N/A 4/11/2017    Procedure: ESOPHAGOGASTRODUODENOSCOPY (EGD) with biopsy;  Surgeon: Jesus Woods MD;  Location: [facility];  Service:     ESOPHAGOSCOPY, GASTROSCOPY, DUODENOSCOPY (EGD), COMBINED N/A 2/8/2018    Procedure: ESOPHAGOGASTRODUODENOSCOPY (EGD);  Surgeon: Sherif Valentine MD;  Location: [facility];  Service:     IR PARACENTESIS  11/22/2023    IR PARACENTESIS  5/8/2018    LAPAROSCOPIC CHOLECYSTECTOMY N/A 3/16/2017    Procedure: LAPAROSCOPIC CHOLECYSTECTOMY, ;  Surgeon: Jesus Ramirez DO;  Location: [facility] OR;  Service:     OTHER SURGICAL HISTORY      left forearm fracturewith hardware         SOCIAL HISTORY:  Social History     Tobacco Use    Smoking status: Some Days     Types: Cigarettes    Smokeless tobacco: Never    Tobacco comments:     approx. 1 a week   Substance Use Topics    Alcohol use: No     Comment: Alcoholic Drinks/day: sober since 11/2016    Drug use: No       FAMILY HISTORY:  No known family history of liver disease    PHYSICAL EXAM:   /75 (BP Location: Left arm)   Pulse 113   Temp 97.4  F (36.3  C) (Oral)   Resp 16   Ht 1.549 m (5' 1\")   Wt 44.3 kg (97 lb 10.6 oz)   SpO2 98%   BMI 18.45 kg/m      Constitutional: tired  Cardiovascular: RRR, normal S1 and S2, no r/c/g/m  Respiratory: CTAB  Psychiatric: awake but not fully oriented.  Head: Normocephalic. Atraumatic.    Neck:  normal size, trachea midline  Eyes:  no icterus  ENT:  hearing adequate  Abdomen:   Auscultation: normal  Appearance: minimally distended, protruding umbilical hernia  Palpation: non tender  NEURO: awake, following command, not fully oriented  SKIN: rash on abdomen and back          ADDITIONAL COMMENTS:   I reviewed the patient's new clinical lab test results.   Recent Labs   Lab Test 12/09/23  0650 12/08/23  0904 " 11/14/23  1238 09/28/23  1050 08/07/23  1437   WBC 6.0 6.2 4.5 5.0 5.9   HGB 10.1* 10.5* 10.4* 11.2* 11.7   * 100 100 101* 98   PLT 66* 70* 64* 93* 77*   INR  --  1.37*  --  1.08 1.41*     Recent Labs   Lab Test 12/09/23  0650 12/08/23  0904 11/14/23  1238    138 137   POTASSIUM 3.1* 3.2* 3.4   CHLORIDE 111* 105 100   CO2 21* 23 27   BUN 5.9* 5.4* 10.0   CR 0.59 0.50* 0.78   ANIONGAP 10 10 10   CARLOS 7.6* 7.6* 8.1*   * 120* 103*     Recent Labs   Lab Test 12/09/23  0650 12/09/23  0155 12/08/23  0904 11/14/23  1238 11/14/23  1216 07/03/22  0221 07/02/22  1151 06/29/22  1118 12/29/21  1614 02/08/18  0910 01/25/18  0827   ALBUMIN 2.2*  --  2.6* 2.9*  --    < >  --    < > 3.4*   < > 3.5   BILITOTAL 2.7*  --  2.1* 1.9*  --    < >  --    < > 1.2*   < > 2.1*   ALT 30  --  32 38  --    < >  --    < > 30   < > 18   AST 60*  --  63* 61*  --    < >  --    < > 84*   < > 46*   ALKPHOS 137  --  143 129  --    < >  --    < > 109   < > 118   PROTEIN  --  Negative  --   --  Negative  --  Negative  --   --   --   --    LIPASE  --   --  66*  --   --   --   --   --  20  --  26    < > = values in this interval not displayed.           CONSULTATION ASSESSMENT AND PLAN:    Principal Problem:      Hepatic encephalopathy     Liver cirrhosis    Assessment: Patient has end stage liver disease.  EtOH cirrhosis with relapses, no EtOH now for several months as she has been living with her daughter.  On Xifaxan and lactulose, lactulose stopped earlier this week due to profuse diarrhea. Restarted this admission. Last EGD 7/23 with no obvious varices, portal hypertensive gastropathy.    Patient's liver condition has been worsening over the past month. Patient had planned to meet with palliative care as an outpatient but this has not happened yet.  Sacropenia worsening.  Acute worsening of mental status with diagnosis of Shingles in the past week as well.      Plan:   - shingles care per internal medicine  - Xifaxan and lactulose,  monitor stools    Diarrhea  Assessment: This has been worsening and interfeing with patient's sleep at home. History of CDiff in the past.  Stool cultures negative so far.  Plan:  - cdiff and pancreatic elastase pending    Ascites  Assessment:  TIPS placed 11/10/23 for refractory ascites and need for almost weekly catrachita. TIPS could also be contributing to worsening encephalopathy.  Plan:  - monitor ascites    Patient is refusing some medications at home and when living by herself was not taking medications regularly. Her liver condition has been noticeably deteriorating over the past 6 months with worsening sarcopenia, ascites had been worsening as well.  We have had discussions about palliative care and even hospice - patient has been reluctant but recommend Palliative Care consult this admission for goals of care.        Discussed with patient's daughter by phone.    Mehnaz Gaspar MD  [facility]  Office:  [phone]    Approximately 45 minutes of total time was spent providing patient care, including patient evaluation, reviewing documentation/test results, and .

## 2023-12-09 NOTE — PROGRESS NOTES
[facility]    History and Physical - Hospitalist Service       Date of Admission:  12/8/2023    Assessment & Plan      Lori Jean-Baptiste is a 58 year old female with past medical history significant for alcohol-induced cirrhosis c/b esophageal varices s/p TIPS (11/2023), hepatic encephalopathy, chronic hepatitis C infection, intussusception, hypertension, hyperlipidemia, chronic anemia who presented to [facility] on 12/8/2023 with worsening encephalopathy and frequent diarrhea.    Acute Toxic/Metabolic Encephalopathy  Hepatic Encephalopathy  Hyperammonemia  Patient has history of hepatic encephalopathy, recently admitted to [place] for exacerbation. She was restarted on her lactulose with improvement, but left AMA. Since returning home, she has not been taking her lactulose consistently as she has had significant amounts of diarrhea otherwise. She was reportedly told to stop lactulose by her GI physician. Unfortunately, patient became more confused over a few days. Additionally, took 2 doses of lorazepam prior to paracentesis rather than 1. Suspect that somnolence/encephalopathy a combination of hepatic and toxic with prolonged clearing of benzodiazapienes in setting of chronic liver disease. Ammonia 83-->154-->122 on presentation. CT head does show extensive burden of white matter hypodensity which may be due to regions of demyelination; unclear if this is acute as it was not noted in prior CT at 11/2023 at outside hospital. Obtaining MRI, again reiterating area that might show demyelination but could also be from chronic small vessel disease. Also has disseminated zoster so cannot rule out other potential infectious causes. Will provide with lactulose and symptomatic treatment. Patient more awake today. Will continue lactulose. Deferring LP as patient and daughter have decided to pursue hospice.  -Continue lactulose  -Rifaxamin    Frequent Diarrhea  Likely related to liver  disease, but with mildly elevated lipase could potentially indicated a component of chronic pancreatitis or malabsorption. Stool studies without infectious source. Continue lactulose.    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 This does appear mostly stable, but bilirubin 2.1 on arrival. This was 1.9 11/14/2023 (essentially same). Apparently not taking lasix, spironolactone at home. Patient reports that she is not ready for hospice to daughter, but does refuse to take medications. GI consulted and recommending palliative consult and consideration of hospice. Discussed goals of care with patient and her daughter. The decision was made to enroll in hospice. Stopping lasix/spironolactone indefinitely. Making patient DNR/DNI per discussion goals.    Disseminated Herpes Zoster  Vesicular rash including dermatomal distribution on both anterior and posterior thorax. Was on valganciclovir outpatient, will change to acyclovir IV inpatient. Isolation needed. ID consulted who recommend continuing acyclovir. LP deferred as patient more awake today, and decided to pursue hospice.    Mild Lipase Elevation: Lipase 66. No acute abdominal pain, but may have some component of chronic pancreatitis?  Hypokalemia: K 3.2; suspect due to poor oral intake and GI losses. Replete PRN.  Hypomagnesemia: Mg 1.6; suspect due to poor oral intake. Replete PRN.  Hypoalbuminemia / Total Protein Deficiency: Albumin 2.6, total protein 5.7. Suspect nutritional. Checking prealbumin.  Hypertension: Normotensive in emergency department. Monitoring and restart medications if needed.  Hyperlipidemia: Not on statin.  Chronic Anemia: At baseline 10-11.  Thrombocytopenia: Platelet 70, increase from 64 11/14/2023. Suspect due to cirrhosis. Monitoring. No evidence of bleeding.  Coagulopathy: INR 1.37 likely 2/2 poor synthetic liver function. Monitoring. No  "evidence of bleeding.        Diet: Combination Diet Regular Diet; 2 gm NA DietNPO until patient more awake; ok for sips and ice chips if nursing deems patient awake enough to manage.  DVT Prophylaxis: Pneumatic Compression Devices  Nelson Catheter: Not present  Lines: None     Cardiac Monitoring: None  Code Status: DNR/DNI    Clinically Significant Risk Factors        # Hypokalemia: Lowest K = 3.1 mmol/L in last 2 days, will replace as needed     # Hypomagnesemia: Lowest Mg = 1.6 mg/dL in last 2 days, will replace as needed   # Hypoalbuminemia: Lowest albumin = 2.2 g/dL at 12/9/2023  6:50 AM, will monitor as appropriate    # Coagulation Defect: INR = 1.37 (Ref range: 0.85 - 1.15) and/or PTT = 33 Seconds (Ref range: 22 - 38 Seconds), will monitor for bleeding  # Thrombocytopenia: Lowest platelets = 66 in last 2 days, will monitor for bleeding   # Hypertension: Noted on problem list                   Disposition Plan      Expected Discharge Date: 12/12/2023                  Nelson Hall MD  Hospitalist Service  [facility]  Securely message with [employer] (more info)  Text page via [facility] Paging/Directory     ______________________________________________________________________    Interval History  Nursing notes reviewed; no acute events overnight. Patient more awake today. Discussion with patient and daughter and decision was made to pursue hospice. Patient reporting back pain/abdominal pain and hoping for some pain medications.    Physical Exam   Temp: 97.4  F (36.3  C) Temp src: Oral BP: 131/75 Pulse: 113   Resp: 16 SpO2: 98 % O2 Device: None (Room air)   Height: 154.9 cm (5' 1\") Weight: 44.3 kg (97 lb 10.6 oz)  Estimated body mass index is 18.45 kg/m  as calculated from the following:    Height as of this encounter: 1.549 m (5' 1\").    Weight as of this encounter: 44.3 kg (97 lb 10.6 oz).    General: Very pleasant female resting comfortably in hospital bed. Sleeping. Wakes and participates in " conversation. Daughter at bedside.  HEENT: Normocephalic, atraumatic.  PERRL, EOMI.  Conjunctiva clear, sclerae anicteric.  Mucous membranes moist. Mildly jaundiced.  Cardiac: Tachycardic with normal rhythm without murmur, gallop, or rub.  2+ peripheral edema.  Respiratory: Normal work of breathing.  Clear to auscultation bilaterally without wheezing, rales, or rhonchi.  GI: Normal, active bowel sounds.  Abdomen soft, nontender, nondistended.  : Deferred.  Musculoskeletal: Moving all extremities appropriately.  Neurologic: Alert and oriented x4.  Cranial nerves II through XII grossly intact.  Psychologic: Appropriate mood and affect.      Medical Decision Making       40 MINUTES SPENT BY ME on the date of service doing chart review, history, exam, documentation & further activities per the note.

## 2023-12-09 NOTE — PROGRESS NOTES
"Cross Cover    Called for lethargy and urinary retention  I went to evaluate kerry. She will awake to voice but is clearly lethargic.  She is oriented to self/hospital, responds \"1923\" for the year.  Jalen donald is president    Received oxy 5 mg earlier last night    Ordered 20 gm stat lactulose  Ordered stat VBG and NH3    NH3 154   VBG 7.47/33    Ordered additional lactulose in 2 hours  Michael NH3 in am           "

## 2023-12-10 NOTE — PLAN OF CARE
VSS on ra. Pt lethargic. Awakens to voice. Is oriented to self but knows she is in the hospital but thinks she is at [facility]. Pt c/o generalized abd discomfort. LS clear. BLE numbness. 2gm NA. Assist of 1-2. RPIV. Plan to transition to comfort.

## 2023-12-10 NOTE — CONSULTS
Care Management Initial Consult    General Information  Assessment completed with: Children,    Type of CM/SW Visit: Initial Assessment    Primary Care Provider verified and updated as needed:     Readmission within the last 30 days:        Reason for Consult: discharge planning, end of life/hospice  Advance Care Planning:            Communication Assessment  Patient's communication style: spoken language (English or Bilingual)    Hearing Difficulty or Deaf: no   Wear Glasses or Blind: yes    Cognitive  Cognitive/Neuro/Behavioral: WDL  Level of Consciousness: alert  Arousal Level: opens eyes spontaneously  Orientation: person, place, situation  Mood/Behavior: calm  Best Language: 0 - No aphasia  Speech: clear    Living Environment:   People in home: child(nadiya), adult, grandchild(nadiya)     Current living Arrangements: house      Able to return to prior arrangements: yes       Family/Social Support:  Care provided by: child(nadiya)  Provides care for: no one, unable/limited ability to care for self     Children          Description of Support System: Supportive, Involved         Current Resources:   Patient receiving home care services: No     Community Resources: [employer], [facility] Worker  Equipment currently used at home: walker, standard  Supplies currently used at home:      Employment/Financial:  Employment Status:     unknown      Financial Concerns:   no       Does the patient's insurance plan have a 3 day qualifying hospital stay waiver?  No    Lifestyle & Psychosocial Needs:  Social Determinants of Health     Food Insecurity: Not on file   Depression: Not on file   Housing Stability: Not on file   Tobacco Use: High Risk (12/7/2023)    Patient History     Smoking Tobacco Use: Some Days     Smokeless Tobacco Use: Never     Passive Exposure: Not on file   Financial Resource Strain: Not on file   Alcohol Use: Not on file   Transportation Needs: Not on file   Physical Activity: Not on file   Interpersonal Safety:  Not on file   Stress: Not on file   Social Connections: Not on file       Functional Status:  Prior to admission patient needed assistance:   Dependent ADLs:: Bathing, Dressing, Grooming, Positioning, Transfers, Toileting  Dependent IADLs:: Cleaning, Cooking, Laundry, Shopping, Meal Preparation, Medication Management, Money Management, Transportation       Mental Health Status:  Mental Health Status: No Current Concerns       Chemical Dependency Status:  Chemical Dependency Status: No Current Concerns             Values/Beliefs:  Spiritual, Cultural Beliefs, Adventism Practices, Values that affect care:    Description of Beliefs that Will Affect Care: Orthodoxy            Additional Information:  SW spoke with pt's dtr, Dejah. Pt lives with dtr, son-n-law and three grandchildren. Gretelleonie reports she has been assisting with all of pt's cares.  Dejah reports pt is suppose to use either a cane or walker, however, has not been using any assistive devices.      Dejah reports she works from home full time and is also pt's PCA 27/week.  Pt has a waiver [employer] and a SW from [facility].      Dejah reports two weeks ago Ligia FERNANDEZ made a hospice referral to [facility] and Dejah met with Lupis Teague.      At this time pt/family would like pt to discharge back home with dtr and referral to [facility].     SONAL called to make hospice referral and is waiting a call back.  Dtr reports pt can sit up and is more alert in the morning, therefore reports she can provide transportation at discharge.     Chuyita MAGALLANES, [facility]  Inpatient Care Coordination   [facility]   [phone]      ADDENDUM:  SONAL heard back from Kishor at [facility]. They will have a nurse here on Monday between 9-11 to assess and the plan will be for pt to discharge to [facility]trs.  SONAL confirmed address with dtr (address on facesheet).  Dtr will be able to provide transport.  MD updated  with plan.

## 2023-12-10 NOTE — PROGRESS NOTES
"GASTROENTEROLOGY PROGRESS NOTE    SUBJECTIVE:  Feeling more comfortable today.  Per chart family is pursuing comfort measures.    OBJECTIVE:    /68 (BP Location: Left arm)   Pulse 114   Temp 97.8  F (36.6  C) (Oral)   Resp 20   Ht 1.549 m (5' 1\")   Wt 44.3 kg (97 lb 10.6 oz)   SpO2 98%   BMI 18.45 kg/m    Temp (24hrs), Av.8  F (37.1  C), Min:97.8  F (36.6  C), Max:99.7  F (37.6  C)    Patient Vitals for the past 72 hrs:   Weight   23 1714 44.3 kg (97 lb 10.6 oz)       Intake/Output Summary (Last 24 hours) at 12/10/2023 1431  Last data filed at 12/10/2023 0600  Gross per 24 hour   Intake 100 ml   Output --   Net 100 ml         PHYSICAL EXAM    Constitutional: NAD, comfortable  Abdomen: minimally distended        Additional Comments:  ROS, FH, SH: See initial GI consult for details.    I have reviewed the patient's new clinical lab results:    Recent Labs   Lab Test 23  0650 23  0904 23  1238 23  1050 23  1437   WBC 6.0 6.2 4.5 5.0 5.9   HGB 10.1* 10.5* 10.4* 11.2* 11.7   * 100 100 101* 98   PLT 66* 70* 64* 93* 77*   INR  --  1.37*  --  1.08 1.41*     Recent Labs   Lab Test 23  0650 23  0904 23  1238    138 137   POTASSIUM 3.1* 3.2* 3.4   CHLORIDE 111* 105 100   CO2 21* 23 27   BUN 5.9* 5.4* 10.0   CR 0.59 0.50* 0.78   ANIONGAP 10 10 10   CARLOS 7.6* 7.6* 8.1*     Recent Labs   Lab Test 23  0650 23  0155 23  0904 23  1238 23  1216 22  0221 22  1151 22  1118 21  1614 18  0910 18  0827   ALBUMIN 2.2*  --  2.6* 2.9*  --    < >  --    < > 3.4*   < > 3.5   BILITOTAL 2.7*  --  2.1* 1.9*  --    < >  --    < > 1.2*   < > 2.1*   ALT 30  --  32 38  --    < >  --    < > 30   < > 18   AST 60*  --  63* 61*  --    < >  --    < > 84*   < > 46*   ALKPHOS 137  --  143 129  --    < >  --    < > 109   < > 118   PROTEIN  --  Negative  --   --  Negative  --  Negative  --   --   --   --  "   LIPASE  --   --  66*  --   --   --   --   --  20  --  26    < > = values in this interval not displayed.     Hepatic encephalopathy     Liver cirrhosis    Assessment: Patient has end stage liver disease.  EtOH cirrhosis with relapses, no EtOH now for several months as she has been living with her daughter.  On Xifaxan and lactulose, lactulose stopped earlier this week due to profuse diarrhea. Restarted this admission. Last EGD 7/23 with no obvious varices, portal hypertensive gastropathy.     Patient's liver condition has been worsening over the past month. Patient had planned to meet with palliative care as an outpatient but this has not happened yet.  Sacropenia worsening.  Acute worsening of mental status with diagnosis of Shingles in the past week as well.       Plan:   - per chart, family pursuring comfort measures  - consider continuing lactulose/xifaxan pending input from Palliative Care        Ascites  Assessment:  TIPS placed 11/10/23 for refractory ascites and need for almost weekly catrachita. TIPS could also be contributing to worsening encephalopathy.  Plan:  - monitor ascites          Mehnaz Gaspar  [facility]  Office:  [phone]    Approximately 15 minutes of total time was spent providing patient care, including patient evaluation, reviewing documentation/test results, and .

## 2023-12-10 NOTE — PROGRESS NOTES
[facility]    History and Physical - Hospitalist Service       Date of Admission:  12/8/2023    Assessment & Plan      Lori Jean-Baptiste is a 58 year old female with past medical history significant for alcohol-induced cirrhosis c/b esophageal varices s/p TIPS (11/2023), hepatic encephalopathy, chronic hepatitis C infection, intussusception, hypertension, hyperlipidemia, chronic anemia who presented to [facility] on 12/8/2023 with worsening encephalopathy and frequent diarrhea.    Acute Toxic/Metabolic Encephalopathy  Hepatic Encephalopathy  Hyperammonemia  Patient has history of hepatic encephalopathy, recently admitted to [place] for exacerbation. She was restarted on her lactulose with improvement, but left AMA. Since returning home, she has not been taking her lactulose consistently as she has had significant amounts of diarrhea otherwise. She was reportedly told to stop lactulose by her GI physician. Unfortunately, patient became more confused over a few days. Additionally, took 2 doses of lorazepam prior to paracentesis rather than 1. Suspect that somnolence/encephalopathy a combination of hepatic and toxic with prolonged clearing of benzodiazapienes in setting of chronic liver disease. Ammonia 83-->154-->122 on presentation. CT head does show extensive burden of white matter hypodensity which may be due to regions of demyelination; unclear if this is acute as it was not noted in prior CT at 11/2023 at outside hospital. Obtaining MRI, again reiterating area that might show demyelination but could also be from chronic small vessel disease. Also has disseminated zoster so cannot rule out other potential infectious causes. Will provide with lactulose and symptomatic treatment. Patient more awake today. Will continue lactulose. Deferring LP as patient and daughter have decided to pursue hospice. SW assisting with organizing hospice agency; plan will be for hospice to do intake  appointment tomorrow with likely discharge home after that.  -Continue lactulose  -Rifaxamin    Frequent Diarrhea  Likely related to liver disease, but with mildly elevated lipase could potentially indicated a component of chronic pancreatitis or malabsorption. Stool studies without infectious source. Continue lactulose.    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 This does appear mostly stable, but bilirubin 2.1 on arrival. This was 1.9 11/14/2023 (essentially same). Apparently not taking lasix, spironolactone at home. Patient reports that she is not ready for hospice to daughter, but does refuse to take medications. GI consulted and recommending palliative consult and consideration of hospice. Discussed goals of care with patient and her daughter. The decision was made to enroll in hospice. Stopping lasix/spironolactone indefinitely. Making patient DNR/DNI per discussion goals.    Disseminated Herpes Zoster  Vesicular rash including dermatomal distribution on both anterior and posterior thorax. Was on valganciclovir outpatient, will change to acyclovir IV inpatient. Isolation needed. ID consulted who recommend continuing acyclovir. LP deferred as patient more awake today, and decided to pursue hospice. Transition back to valacyclovir on discharge.    Mild Lipase Elevation: Lipase 66. No acute abdominal pain, but may have some component of chronic pancreatitis?  Hypokalemia: K 3.2; suspect due to poor oral intake and GI losses. Replete PRN.  Hypomagnesemia: Mg 1.6; suspect due to poor oral intake. Replete PRN.  Hypoalbuminemia / Total Protein Deficiency: Albumin 2.6, total protein 5.7. Suspect nutritional. Checking prealbumin.  Hypertension: Normotensive in emergency department. Monitoring and restart medications if needed.  Hyperlipidemia: Not on statin.  Chronic Anemia: At baseline 10-11.  Thrombocytopenia:  "Platelet 70, increase from 64 11/14/2023. Suspect due to cirrhosis. Monitoring. No evidence of bleeding.  Coagulopathy: INR 1.37 likely 2/2 poor synthetic liver function. Monitoring. No evidence of bleeding.        Diet: Combination Diet Regular Diet; 2 gm NA Diet  DietNPO until patient more awake; ok for sips and ice chips if nursing deems patient awake enough to manage.  DVT Prophylaxis: Pneumatic Compression Devices  Nelson Catheter: Not present  Lines: None     Cardiac Monitoring: None  Code Status: DNR/DNI    Clinically Significant Risk Factors        # Hypokalemia: Lowest K = 3.1 mmol/L in last 2 days, will replace as needed       # Hypoalbuminemia: Lowest albumin = 2.2 g/dL at 12/9/2023  6:50 AM, will monitor as appropriate    # Coagulation Defect: INR = 1.37 (Ref range: 0.85 - 1.15) and/or PTT = 33 Seconds (Ref range: 22 - 38 Seconds), will monitor for bleeding  # Thrombocytopenia: Lowest platelets = 66 in last 2 days, will monitor for bleeding   # Hypertension: Noted on problem list                   Disposition Plan      Expected Discharge Date: 12/12/2023      Destination: home with family            Nelson Hall MD  Hospitalist Service  [facility]  Securely message with [employer] (more info)  Text page via [employer] Paging/Directory     ______________________________________________________________________    Interval History  Nursing notes reviewed; no acute events overnight. Patient more and interactive today. Stating she feels better. Daughter an other family members at bedside; state patient is near her baseline mentation.    Physical Exam   Temp: 98  F (36.7  C) Temp src: Oral BP: 115/63 Pulse: 114   Resp: 20 SpO2: 98 % O2 Device: None (Room air)   Height: 154.9 cm (5' 1\") Weight: 44.3 kg (97 lb 10.6 oz)  Estimated body mass index is 18.45 kg/m  as calculated from the following:    Height as of this encounter: 1.549 m (5' 1\").    Weight as of this encounter: 44.3 kg (97 lb 10.6 " oz).    General: Very pleasant female resting comfortably in hospital bed. Sleeping. Wakes and participates in conversation. Jovial Daughter at bedside.   HEENT: Normocephalic, atraumatic.  PERRL, EOMI.  Conjunctiva clear, sclerae anicteric.  Mucous membranes moist. Mildly jaundiced.  Cardiac: Tachycardic with normal rhythm without murmur, gallop, or rub.  2+ peripheral edema.  Respiratory: Normal work of breathing.  Clear to auscultation bilaterally without wheezing, rales, or rhonchi.  GI: Normal, active bowel sounds.  Abdomen soft, nontender, nondistended.  : Deferred.  Musculoskeletal: Moving all extremities appropriatel  Neurologic: Alert and oriented x4.  Cranial nerves II through XII grossly intact.  Psychologic: Appropriate mood and affect.      Medical Decision Making       40 MINUTES SPENT BY ME on the date of service doing chart review, history, exam, documentation & further activities per the note.

## 2023-12-11 NOTE — CONSULTS
Interventional Radiology - Progress Note  Inpatient - [facility]  12/11/2023    IR Brief Note    Patient with ESLD requiring multiple LVPs in the recent past. Patient is discharging with hospice care and IR consulted for tunneled peritoneal drain placement tomorrow prior to discharge.    Labs and medications within procedural parameters.    Reviewed with Dr Acosta who is in agreement with plan    NPO at [facility] for procedure as ordered.    Eduardo Phelps PA-C  Interventional Radiology  [phone] (IR)  [phone] ([facility] Office)

## 2023-12-11 NOTE — CARE PLAN
Patient Transfer Information  Patient connected to monitoring equipment on arrival: yes Vital signs monitor     Patient connected to wall oxygen on arrival: N/A    Belongings: Transferred with patient    Safety check completed: Yes     Transfer to room 321

## 2023-12-11 NOTE — PROGRESS NOTES
"[facility]  Infectious Disease Progress Note          Assessment and Plan:   Date of Admission:  12/8/2023  Date of Consult (When I saw the patient): 12/09/23        Assessment & Plan  Lori Jean-Baptiste is a 58 year old who was admitted on 12/8/2023.      Impression:  59 yo with alcohol-induced cirrhosis c/b esophageal varices s/p TIPS (11/2023), hepatic encephalopathy, chronic hepatitis C infection, intussusception, hypertension, hyperlipidemia, chronic anemia  Presented with diarrhea   Noted to have rash concern for zoster   Also given lethargy, altered mental status concern for disseminated zoster though has other reasons for mental status as well   On acyclovir      Recommendations;   1.  Would like comfort care, agree with treat with oral Valtrex for about 1 week even on comfort care  Call if issues           Interval History:     no new issues, now on comfort care              Medications:      acyclovir  10 mg/kg Intravenous Q8H    camphor-menthol   Topical TID    gabapentin  100 mg Oral TID    lactulose  10 g Oral TID    rifaximin  200 mg Oral BID    sodium chloride (PF)  3 mL Intracatheter Q8H                  Physical Exam:   Blood pressure 119/65, pulse 110, temperature 97.6  F (36.4  C), temperature source Oral, resp. rate 16, height 1.549 m (5' 1\"), weight 44.3 kg (97 lb 10.6 oz), SpO2 97%.  Wt Readings from Last 2 Encounters:   12/08/23 44.3 kg (97 lb 10.6 oz)   07/04/22 56.1 kg (123 lb 9.6 oz)     Vital Signs with Ranges  Temp:  [97.6  F (36.4  C)-98.3  F (36.8  C)] 97.6  F (36.4  C)  Pulse:  [106-114] 110  Resp:  [16-20] 16  BP: (113-119)/(63-68) 119/65  SpO2:  [97 %-98 %] 97 %    Constitutional: Awake, alert, cooperative, no apparent distress     Lungs: Clear to auscultation bilaterally, no crackles or wheezing   Cardiovascular: Regular rate and rhythm, normal S1 and S2, and no murmur noted   Abdomen: Normal bowel sounds, soft, non-distended, non-tender   Skin: No rashes, " "no cyanosis, no edema   Other:           Data:   All microbiology laboratory data reviewed.  Recent Labs   Lab Test 12/09/23  0650 12/08/23  0904 11/14/23  1238   WBC 6.0 6.2 4.5   HGB 10.1* 10.5* 10.4*   HCT 30.9* 31.1* 31.3*   * 100 100   PLT 66* 70* 64*     Recent Labs   Lab Test 12/09/23  0650 12/08/23  0904 11/14/23  1238   CR 0.59 0.50* 0.78     No lab results found.  No lab results found.    Invalid input(s): \"UC\"     "

## 2023-12-11 NOTE — PROGRESS NOTES
Care Management Follow Up    Length of Stay (days): 3    Expected Discharge Date: 12/12/2023     Concerns to be Addressed:       Patient plan of care discussed at interdisciplinary rounds: Yes    Anticipated Discharge Disposition:       Anticipated Discharge Services:    Anticipated Discharge DME:      Patient/family educated on Medicare website which has current facility and service quality ratings:    Education Provided on the Discharge Plan:    Patient/Family in Agreement with the Plan: yes    Referrals Placed by CM/SW:    Private pay costs discussed: transportation costs  Reviewed out of pocket cost for [facility] [place] stretcher transport, $1117.00 for base rate and $26.06 per mile to the destination. Pt/family expressed understanding and are agreeable to this.  Explained that MA typically covers but encouraged pt daughter to call insurance if she has any concerns regarding cost.     Patient requires stretcher transportation due to supervision/hospice    Stretcher transportation has been arranged for [id number]. Patient and bedside nurse notified of transportation time.    Ambulance PCS form completed and placed in patient chart. Ambulance PCS form should be given to transportation team.   Additional Information:    [facility] (P: [phone] F: [phone]) met with pt and can accept pt tomorrow 12/12 and would like an 1100 admission so long as pts drain is placed prior to discharge. They need 3 days of medications filled and sent with pt. They will deliver pt hospital bed tonight.    SONAL spoke with pt daughter Ivan who reports that pt will need [facility] transport home. SONAL set stretcher transport tomorrow for [phone], pt daughter is aware and agreeable. Address confirmed.     Plan for discharge to daughter home with [facility] tomorrow 12/12 via [facility] WC between 3021-5541 with an 1100 hospice admission at pt home. 3 days of medications to be filled. If drain cannot be placed in time,  discharge may need to be pushed back.     Addendum    Pt will have a drain placed tomorrow morning around 0900. Spoke with [facility] who can move pt admission to 1300 tomorrow 12/12. SW placed call to transport who will push transport between [phone]. Plan for discharge home with daughter and [facility] (P: [phone] F: [phone]) tomorrow 12/12 via [facility] stretcher between 5759-1942, 3 days of medications to be filled. Orders for [facility] to evaluate and treat will be needed.     ELPIDIO Preciado, [facility]  Inpatient Care Coordination  Emergency Room /Float  [phone]  Jacinta Ayala, [facility]

## 2023-12-11 NOTE — PLAN OF CARE
Goal Outcome Evaluation:      Plan of Care Reviewed With: patient, family    Overall Patient Progress: no changeOverall Patient Progress: no change     Temp: 97.6  F (36.4  C) Temp src: Oral BP: 119/65 Pulse: 110   Resp: 16 SpO2: 97 % O2 Device: None (Room air)       A/O4. Forgetful. A1 pivot to commode. IV acyclovir. Lotion applied to shingles lesions. Scabs on L chest. Oxycodone given x1 for back and shoulder pain. Pleurex drain to be placed tomorrow. NPO after midnight. Discharge on hospice via stretcher transport tomorrow.

## 2023-12-11 NOTE — CONSULTS
Palliative Care Consultation Note  [facility]      Patient: Lori Jean-Baptiste  Date of Admission:  12/8/2023    Requesting Clinician / Team: hospitalist  Reason for consult:   Goals of care  Decisional support  Patient and family support       Recommendations & Counseling     GOALS OF CARE:   Hospice on discharge  Pleurx drain for comfort    ADVANCE CARE PLANNING:  No health care directive on file. Per  informed consent policy, next of kin should be involved if patient becomes unable.  There is no POLST form on file, defer to patient and/or next of kin for decisions   Code status: No CPR- Do NOT Intubate    MEDICAL MANAGEMENT:   #Pain   [place] Board of Pharmacy Data Base Reviewed: Yes:   reviewed - controlled substances reflected in medication list.  Opioids: oxycodone (Roxicodone) IR 5 mg every 6 hours PRN- recommend every 2 hours PRN  Anti-convulsants:  Gabapentin (Neurontin)  Repositioning for comfort     #General Weakness/Debility   Appreciate the help of staff for ADLs per plan of care    PSYCHOSOCIAL/SPIRITUAL SUPPORT:  Family   Friends   [place] community: No [place]     Palliative Care will continue to follow. Thank you for the consult and allowing us to aid in the care of Lori Jean-Baptiste.    These recommendations have been discussed with medical team.    Naheed Shin NP  Securely message with plista (more info)  Text page via [facility] Paging/Directory       Assessment      Lori Jean-Baptiste is a 58 year old female with a past medical history of alcohol induced cirrhosis with esophageal varices s/p tips (11/2023) hepatic encephalopathy, chronic hep c infection, intussusception, HTN, HLD, chronic anemia who presented on 12/8 with worsening encephalopathy and frequent diarrhea..      Today, the patient was seen for:  Goals of care    Palliative Care Summary:   Met with patient at the bedside..   Introduced the role of palliative care as an interdisciplinary team  that cares for patients with serious illness to help support symptom management, communication, coping for patients and their families as well as support with medical decision making.    Prognosis, Goals, & Planning:    Functional Status just prior to this current hospitalization:  Overall decline    Prognosis, Goals, and/or Advance Care Planning:  Education provided regarding hospice philosophy, prognostic,and eligibility criteria. Discussed what services are provided and those that are not,  Discussed common misconceptions. We explored the various disposition options where they can receive hospice care (home, residential hospice homes, LTC with hospice) including subsequent financial and familial implications. Discussed typical anticipated timing of discharge.    Code Status was addressed today:   Already addressed        Patient has decision-making capacity today for complex decisions:Intact            Coping, Meaning, & Spirituality:   Mood, coping, and/or meaning in the context of serious illness were addressed today: Yes    Social:   Living situation:lives with family    Medications:  I have reviewed this patient's medication profile and medications from this hospitalization. Notable medications: none     ROS:  Comprehensive ROS is reviewed and is negative except as here & per HPI:     Physical Exam   Vital Signs with Ranges  Temp:  [97.8  F (36.6  C)-98.3  F (36.8  C)] 98.1  F (36.7  C)  Pulse:  [106-114] 106  Resp:  [16-20] 16  BP: (113-118)/(63-68) 113/68  SpO2:  [97 %-98 %] 97 %  97 lbs 10.62 oz    PHYSICAL EXAM:  Constitutional: healthy, alert, and no distress   Cardiovascular: negative, PMI normal. No lifts, heaves, or thrills. RRR. No murmurs, clicks gallops or rub  Respiratory: negative, Percussion normal. Good diaphragmatic excursion. Lungs clear  Psychiatric: mentation appears normal and affect normal/bright  Abdomen: Abdomen soft, non-tender. BS normal. No masses, organomegaly  Pain:  grimacing    Data reviewed:  No results found for this or any previous visit (from the past 24 hour(s)).    Medical Decision Making       74 MINUTES SPENT BY ME on the date of service doing chart review, history, exam, documentation & further activities per the note.

## 2023-12-11 NOTE — PROGRESS NOTES
[facility]    History and Physical - Hospitalist Service       Date of Admission:  12/8/2023    Assessment & Plan      Lori Jean-Baptiste is a 58 year old female with past medical history significant for alcohol-induced cirrhosis c/b esophageal varices s/p TIPS (11/2023), hepatic encephalopathy, chronic hepatitis C infection, intussusception, hypertension, hyperlipidemia, chronic anemia who presented to [facility] on 12/8/2023 with worsening encephalopathy and frequent diarrhea.  Discharge orders needed by 10am 12/12/2023.    Acute Toxic/Metabolic Encephalopathy  Hepatic Encephalopathy  Hyperammonemia  Patient has history of hepatic encephalopathy, recently admitted to [place] for exacerbation. She was restarted on her lactulose with improvement, but left AMA. Since returning home, she has not been taking her lactulose consistently as she has had significant amounts of diarrhea otherwise. She was reportedly told to stop lactulose by her GI physician. Unfortunately, patient became more confused over a few days. Additionally, took 2 doses of lorazepam prior to paracentesis rather than one tablet. Suspect that somnolence/encephalopathy a combination of hepatic and toxic with prolonged clearing of benzodiazapienes in setting of chronic liver disease. Ammonia 83-->154-->122 on presentation. CT head does show extensive burden of white matter hypodensity which may be due to regions of demyelination; unclear if this is acute as it was not noted in prior CT at 11/2023 at outside hospital. MRI again reiterating area that might show demyelination but could also be from chronic small vessel disease. Also has disseminated zoster so cannot rule out other potential infectious causes. Patient more awake today. Will continue lactulose. Deferring LP as patient and daughter have decided to pursue hospice. SONAL assisting with organizing hospice agency; plan will be for hospice to do intake appointment  tomorrow (12/12/2023).  -Continue lactulose  -Rifaxamin    Frequent Diarrhea  Likely related to liver disease, but with mildly elevated lipase could potentially indicated a component of chronic pancreatitis or malabsorption. Stool studies without infectious source. Continue lactulose.    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 This does appear mostly stable, but bilirubin 2.1 on arrival. This was 1.9 11/14/2023 (essentially same). Apparently not taking lasix, spironolactone at home. Patient reports that she is not ready for hospice to daughter, but does refuse to take medications. GI consulted and recommending palliative consult and consideration of hospice. Discussed goals of care with patient and her daughter. The decision was made to enroll in hospice. Stopping lasix/spironolactone indefinitely. Making patient DNR/DNI per discussion goals. IR to place peritoneal drain prior to discharge for drainage of ascites as it reaccumulates.    Disseminated Herpes Zoster  Vesicular rash including dermatomal distribution on both anterior and posterior thorax. Was on valganciclovir outpatient, will change to acyclovir IV inpatient. Isolation needed. ID consulted who recommend continuing acyclovir. LP deferred as patient more awake today, and decided to pursue hospice. Transition back to valacyclovir on discharge.    Mild Lipase Elevation: Lipase 66. No acute abdominal pain, but may have some component of chronic pancreatitis?  Hypokalemia: K 3.2; suspect due to poor oral intake and GI losses. Replete PRN.  Hypomagnesemia: Mg 1.6; suspect due to poor oral intake. Replete PRN.  Hypoalbuminemia / Total Protein Deficiency: Albumin 2.6, total protein 5.7. Suspect nutritional. Checking prealbumin.  Hypertension: Normotensive in emergency department. Monitoring and restart medications if needed.  Hyperlipidemia: Not on  "statin.  Chronic Anemia: At baseline 10-11.  Thrombocytopenia: Platelet 70, increase from 64 11/14/2023. Suspect due to cirrhosis. Monitoring. No evidence of bleeding.  Coagulopathy: INR 1.37 likely 2/2 poor synthetic liver function. Monitoring. No evidence of bleeding.        Diet: Combination Diet Regular Diet; 2 gm NA Diet  Diet  NPO per Anesthesia Guidelines for Procedure/Surgery Except for: Meds  Snacks/Supplements Adult: Ensure Enlive; Between MealsNPO until patient more awake; ok for sips and ice chips if nursing deems patient awake enough to manage.  DVT Prophylaxis: Pneumatic Compression Devices  Nelson Catheter: Not present  Lines: None     Cardiac Monitoring: None  Code Status: DNR/DNI    Clinically Significant Risk Factors              # Hypoalbuminemia: Lowest albumin = 2.2 g/dL at 12/9/2023  6:50 AM, will monitor as appropriate       # Hypertension: Noted on problem list                   Disposition Plan      Expected Discharge Date: 12/12/2023      Destination: home with family            Nelson Hall MD  Hospitalist Service  [facility]  Securely message with echoBase (more info)  Text page via [employer] Paging/Directory     ______________________________________________________________________    Interval History  Nursing notes reviewed; no acute events overnight. Patient a bit sleepy today. Also reporting itching on back near shingles rash.    Physical Exam   Temp: 97.6  F (36.4  C) Temp src: Oral BP: 119/65 Pulse: 110   Resp: 16 SpO2: 97 % O2 Device: None (Room air)   Height: 154.9 cm (5' 1\") Weight: 44.3 kg (97 lb 10.6 oz)  Estimated body mass index is 18.45 kg/m  as calculated from the following:    Height as of this encounter: 1.549 m (5' 1\").    Weight as of this encounter: 44.3 kg (97 lb 10.6 oz).    General: Very pleasant female resting comfortably in hospital bed. Sleeping. Wakes and participates in conversation.   HEENT: Normocephalic, atraumatic.  PERRL, EOMI.  Conjunctiva " clear, sclerae anicteric.  Mucous membranes moist. Mildly jaundiced.  Cardiac: Tachycardic with normal rhythm without murmur, gallop, or rub.  2+ peripheral edema.  Respiratory: Normal work of breathing.  Clear to auscultation bilaterally without wheezing, rales, or rhonchi.  GI: Normal, active bowel sounds.  Abdomen soft, nontender, nondistended.  : Deferred.  Musculoskeletal: Moving all extremities appropriatel  Neurologic: Alert and oriented x4.  Cranial nerves II through XII grossly intact.  Psychologic: Appropriate mood and affect.      Medical Decision Making       40 MINUTES SPENT BY ME on the date of service doing chart review, history, exam, documentation & further activities per the note.

## 2023-12-11 NOTE — PROGRESS NOTES
"CLINICAL NUTRITION SERVICES  -  ASSESSMENT NOTE    Recommendations Ordered by Registered Dietitian (RD):   Diet per MD   Ordered Ensure Enlive PRN    Malnutrition:   % Weight Loss:  Weight loss does not meet criteria for malnutrition - low BMI   % Intake:  unable to determine   Subcutaneous Fat Loss:  unable to assess  Muscle Loss:  unable to assess  Fluid Retention:  trace    Malnutrition Diagnosis: Unable to determine due to lack of information to comment on       REASON FOR ASSESSMENT  Lori Jean-Baptiste is a 58 year old female seen by Registered Dietitian for Admission Nutrition Risk Screen for positive    Past medical history: alcohol-induced cirrhosis c/b esophageal varices s/p TIPS (11/2023), hepatic encephalopathy, chronic hepatitis C infection, intussusception, hypertension, hyperlipidemia, chronic anemia     Admitted for:   Acute Toxic/Metabolic Encephalopathy  Hepatic Encephalopathy  Hyperammonemia    NUTRITION HISTORY  - Information obtained from chart - patient enrolling in hospice at discharge and patient on airborne precautions for shingles.   - Food allergies: NKFA    CURRENT NUTRITION ORDERS  Diet Order:     Regular Diet + 2g Na Diet     Current Intake/Tolerance:  No information recorded in the flowsheets to comment on     Obtained from Chart/Interdisciplinary Team:  - GI following   - IR consulted for tunneled peritoneal drain placement   - Reviewed stooling patterns  - Please refer to flowsheets for more information on skin     ANTHROPOMETRICS  Height: 5' 1\"  Weight: 44.3 kg ( 97 lbs 10.62 oz)   Body mass index is 18.45 kg/m .  Weight Status:  Underweight BMI <18.5  Weight History: weight loss of 1.2 kg (2.6%) over the past ~6.5 months   Wt Readings from Last 10 Encounters:   12/08/23 44.3 kg (97 lb 10.6 oz)   07/04/22 56.1 kg (123 lb 9.6 oz)   02/08/18 55.1 kg (121 lb 8 oz)   04/11/17 53.7 kg (118 lb 6.4 oz)   03/15/17 52.9 kg (116 lb 9.6 oz)   03/14/17 54.7 kg (120 lb 11.2 oz)     Per care " everywhere:  45.4 kg (100 lb) 05/26/2023 12:56 PM CDT     43.1 kg (95 lb) 11/29/2023 2:54 PM CST     LABS  Labs reviewed    Labs:  Electrolytes  Potassium (mmol/L)   Date Value   12/09/2023 3.1 (L)   12/08/2023 3.2 (L)   11/14/2023 3.4   07/05/2022 3.6   07/04/2022 3.6   07/03/2022 4.1     Phosphorus (mg/dL)   Date Value   07/05/2022 4.1    Blood Glucose  Glucose (mg/dL)   Date Value   12/09/2023 103 (H)   12/08/2023 120 (H)   11/14/2023 103 (H)   10/31/2023 86   09/28/2023 103 (H)   07/05/2022 101   07/04/2022 107   07/03/2022 103   07/02/2022 95   12/29/2021 117     GLUCOSE BY METER POCT (mg/dL)   Date Value   07/02/2022 104 (H)     Hemoglobin A1C (%)   Date Value   12/28/2018 5.1    Inflammatory Markers  CRP (mg/dL)   Date Value   02/22/2019 <0.1     WBC Count (10e3/uL)   Date Value   12/09/2023 6.0   12/08/2023 6.2   11/14/2023 4.5     Albumin (g/dL)   Date Value   12/09/2023 2.2 (L)   12/08/2023 2.6 (L)   11/14/2023 2.9 (L)   07/03/2022 2.1 (L)   07/02/2022 2.5 (L)   12/29/2021 3.4 (L)      Magnesium (mg/dL)   Date Value   12/08/2023 1.6 (L)   02/01/2023 1.4 (L)   07/05/2022 1.6 (L)     Sodium (mmol/L)   Date Value   12/09/2023 142   12/08/2023 138   11/14/2023 137    Renal  Urea Nitrogen (mg/dL)   Date Value   12/09/2023 5.9 (L)   12/08/2023 5.4 (L)   11/14/2023 10.0   07/05/2022 5 (L)   07/04/2022 5 (L)   07/03/2022 6 (L)     Creatinine (mg/dL)   Date Value   12/09/2023 0.59   12/08/2023 0.50 (L)   11/14/2023 0.78     Additional  Triglycerides (mg/dL)   Date Value   10/19/2022 41     Ketones Urine (mg/dL)   Date Value   12/09/2023 Negative        MEDICATIONS  Medications reviewed     acyclovir  10 mg/kg Intravenous Q8H    camphor-menthol   Topical TID    gabapentin  100 mg Oral TID    lactulose  10 g Oral TID    rifaximin  200 mg Oral BID    sodium chloride (PF)  3 mL Intracatheter Q8H         bisacodyl, calcium carbonate, lidocaine 4%, lidocaine (buffered or not buffered), naloxone **OR** naloxone **OR**  naloxone **OR** naloxone, ondansetron **OR** ondansetron, oxyCODONE, polyethylene glycol, prochlorperazine **OR** prochlorperazine **OR** prochlorperazine, senna-docusate **OR** senna-docusate, sodium chloride (PF)     ASSESSED NUTRITION NEEDS PER APPROVED PRACTICE GUIDELINES:    Dosing Weight 44.3 kg   Estimated Energy Needs: 9667-9666 kcals (30-35 Kcal/Kg)  Justification: repletion and underweight  Estimated Protein Needs: 53-66 grams protein (1.2-1.5 g pro/Kg)  Justification: Repletion and preservation of lean body mass, cirrhosis   Estimated Fluid Needs: per MD     NUTRITION DIAGNOSIS:  Predicted inadequate nutrient intake related to potential for PO intake to decline pending clinical course     NUTRITION INTERVENTIONS  Recommendations / Nutrition Prescription  Diet per MD   Ordered Ensure Enlive PRN     Implementation  Nutrition education: Not appropriate at this time due to patient condition  Medical Food Supplement: as above    Nutrition Goals  Patient to consume 75% of meals or oral nutritional supplements ordered TID vs GOC     MONITORING AND EVALUATION:  Progress towards goals will be monitored and evaluated per protocol and Practice Guidelines.  Will continue to monitor closely for GOC decisions.     Yasmin Barbour RD, LD  Clinical Dietitian     3rd floor/ICU: [phone]  All other floors: [phone]  Weekend/holiday: [phone]  Office: [phone]

## 2023-12-11 NOTE — PLAN OF CARE
"Shift : 0535-2103  /63 (BP Location: Left arm)   Pulse 114   Temp 98  F (36.7  C) (Oral)   Resp 20   Ht 1.549 m (5' 1\")   Wt 44.3 kg (97 lb 10.6 oz)   SpO2 98%   BMI 18.45 kg/m      Shift Summary; Occurences, Discussions & Interventions of Note : Pt alert and oriented, but intermittently confused, and at the end of shift:, compulsive. Moment at end of shift where bed alarm triggered as pt was at the edge of bed. I stuck my head in quickly to ask pt to stay on the bed as I suited up in the propper precaution gear, pt standing up next to bed when I entered the room, I got to the patient and assisted them safely to the bathroom.  Besides this moment, pt cooperative, without confusion.   Pt will soon transition to hospice. I talked about this plan with the pt, therapeutic listening, and allowed a space for pt to express feelings. Pt feels ready, I emphasized the importance of comfort and emphasized the great support system she has.     Assessment Findings of Note : Shingles on L posterior back and L side of chest, pt denied pain but has itchiness. Lung sounds diminished, shallow.     Considerations : Pt compulsive tonight.     Plan : Likely discharging tomorrow, hospice.       Goal Outcome Evaluation:      Plan of Care Reviewed With: patient    Overall Patient Progress: improvingOverall Patient Progress: improving           "

## 2023-12-11 NOTE — PLAN OF CARE
Goal Outcome Evaluation:       VS stable. Diminished LS. +1 edema to BLE. Forgetful. Complained of pain in back and pain med's given. Slept on and off throughout the night.

## 2023-12-11 NOTE — PLAN OF CARE
"/63 (BP Location: Left arm)   Pulse 114   Temp 98  F (36.7  C) (Oral)   Resp 20   Ht 1.549 m (5' 1\")   Wt 44.3 kg (97 lb 10.6 oz)   SpO2 98%   BMI 18.45 kg/m      VSS, PT denies pain, SoB, CP. Large Bmx1 this AM, PT reports relief of abd pain/discomfort. Improved mentation this shift. Palliative consult pending.   "

## 2023-12-11 NOTE — CONSULTS
SPIRITUAL HEALTH SERVICES - Consult  Note  MS 3    Referral Source/ Reason for Visit: Staff consult for emotional support.    Summary and Recommendations - Listened reflectively as patient and adult daughter shared their affirmations of love, forgiveness and family bonds.    Offered to pray for patient and family in the coming days.  Patient and daughter expressed appreciation for visit.    Plan: Patient will discharge to daughter's home with hospice services 12/12/23.  No additional needs.  [facility] remains available upon request.    Rev. JOSE LUIS Townsend.  Staff     SHS available 24/7 for emergent requests/ referrals, either by paging the on-call  or by entering an ASAP/STAT consult in [place], which will also page the on-call .      Assessment    Saw pt Lori Jean-Baptiste and adult daughter regarding staff consult request for emotional support.    Patient/Family Understanding of Illness and Goals of Care - Patient understands that she will be discharged tomorrow to daughter's home with hospice services.    Distress and Loss - She is currently undergoing treatment for shingles; naming that the disease is very painful.  Daughter notes patient's end stage liver disease.    Strengths, Coping and Resources - She leans heavily on her daughter for support.  Other relatives have recently called and visited in order to express their love and appreciation for patient.  This means a great deal to her.    Meaning, Beliefs and Spirituality - Patient identifies as Religion.  She did not name a particular Protestant.  Patient's cousin has been her spiritual resource; guiding her toward letting go of past circumstances; loving and forgiving others.  Patient has been very receptive to these recommendations.

## 2023-12-11 NOTE — PLAN OF CARE
Goal Outcome Evaluation: Pt alert and oriented forgetful. Impulsive and weak trying to get out of bed and setting off bed alarm. Daughter present much of day and helpful and supportive. Small appetite. Pain and itching front chest to back due to shingles rash. Oxy given x 1. Report given to Yasmin as pt transferring to negative airflow room. To get pleurx drain in am and transfer to daughters home with hospice.

## 2023-12-12 NOTE — SEDATION DOCUMENTATION
Post Procedure Summary:  Prior to the start of the procedure and with procedural staff participation, I verbally confirmed the patient s identity using two indicators, relevant allergies, that the procedure was appropriate and matched the consent or emergent situation, and that the correct equipment/implants were available. Immediately prior to starting the procedure I conducted the Time Out with the procedural staff and re-confirmed the patient s name, procedure, and site/side. (The Joint Commission universal protocol was followed.)  Yes                                        Sedatives: Fentanyl and Midazolam (Versed)     Vital signs, airway and pulse oximetry were monitored and remained stable throughout the procedure and sedation was maintained until the procedure was complete.  The patient was monitored by staff until sedation discharge criteria were met.     Patient tolerance: Patient tolerated the procedure well with no immediate complications.     Time of sedation in minutes: 14 Minutes minutes from beginning to end of physician one to one monitoring     Patient transferred to room 321 via cart on 2 L nasal cannula accompanied by RN x 2.  Patient sleepy but following voice commands.  Denies pain, shortness of breath and nausea.  Rt. Abdominal drain site covered with gauze and clear dressing which is clean, dry, intact.  Pleurx kit in room with patient.  Report provided to receiving RN.

## 2023-12-12 NOTE — PLAN OF CARE
Goal Outcome Evaluation:      Plan of Care Reviewed With: patient    Overall Patient Progress: no changeOverall Patient Progress: no change    Outcome Evaluation: .stable    Vitals VSS  Neuro A&Ox4, forgetful   Respiratory 100% on 3 LPM NC   Cardiac/Tele tachy  GI/ Incontinent at times   Skin Shingles rash to L side of back, trunk and chest/abdomen  LDAs PIV SL  Activity A1 gb   Plan Discharge today home on hospice.     Discharged at 1330 with daughter via stretcher to home on hospice.

## 2023-12-12 NOTE — PLAN OF CARE
"Nursig Summary:    [id number]    Pt is alert and oriented.  PRN Oycodone administered for pain. Pt denies any shortness of breath ad on room air. Pt is 1 assist pivot to commode. Airborne and Contact precaution maintained. Ongoing monitoring.    Plan: NPO maintained for procedure this morning prior to discharge. Discharge home with hospice today between 1110 - 1150.    /79 (BP Location: Left arm)   Pulse 106   Temp 97.5  F (36.4  C) (Oral)   Resp 16   Ht 1.549 m (5' 1\")   Wt 44.3 kg (97 lb 10.6 oz)   SpO2 99%   BMI 18.45 kg/m                           "

## 2023-12-12 NOTE — SEDATION DOCUMENTATION
Reviewed how to complete drainage from peritoneal catheter with patient's daughter, Dejah.  Daughter watched Nu-B-2B drainage video (link sent to her email) and was able to visualize the educational dressing change kit.  Daughter verbalized understanding of procedure and denies any further questions or concerns at this time.

## 2023-12-12 NOTE — DISCHARGE SUMMARY
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 Could discuss paracentesis schedule or pleurx   catheter.            Unresulted Labs Ordered in the Past 30 Days of this Admission       Date and Time Order Name Status Description    12/8/2023  5:04 PM Elastase Fecal In process         These results will be followed up by Mehnaz Gaspar      Discharge Disposition   Discharged to home  Condition at discharge: Poor    Hospital Course   Lori Jean-Baptiste is a 58 year old female with past medical history significant for alcohol-induced cirrhosis c/b esophageal varices s/p TIPS (11/2023), hepatic encephalopathy, chronic hepatitis C infection, intussusception, hypertension, hyperlipidemia, chronic anemia who presented to [facility] on 12/8/2023 with worsening encephalopathy and frequent diarrhea.  Patient will be comfort care and have hospice evaluation for admission after discharge    Acute Toxic/Metabolic Encephalopathy  Hepatic Encephalopathy  Hyperammonemia  Patient has history of hepatic encephalopathy, recently admitted to [facility] for exacerbation. She was restarted on her lactulose with  improvement, but left AMA. Since returning home, she has not been taking her lactulose consistently as she has had significant amounts of diarrhea otherwise. She was reportedly told to stop lactulose by her GI physician. Unfortunately, patient became more confused over a few days. Additionally, took 2 doses of lorazepam prior to paracentesis rather than one tablet. Suspect that somnolence/encephalopathy a combination of hepatic and toxic with prolonged clearing of benzodiazapienes in setting of chronic liver disease. Ammonia 83-->154-->122 on presentation. CT head does show extensive burden of white matter hypodensity which may be due to regions of demyelination; unclear if this is acute as it was not noted in prior CT at 11/2023 at outside hospital. MRI again reiterating area that might show demyelination but could also be from chronic small vessel disease. Also has disseminated zoster so cannot rule out other potential infectious causes. Patient more awake today. Will continue lactulose. Deferring LP as patient and daughter have decided to pursue hospice. SW assisting with organizing hospice agency; plan will be for hospice to do intake appointment tomorrow (12/12/2023).  -Continue lactulose  -Rifaxamin     Frequent Diarrhea  Likely related to liver disease, but with mildly elevated lipase could potentially indicated a component of chronic pancreatitis or malabsorption. Stool studies without infectious source. Continue lactulose.     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 This does appear mostly stable, but bilirubin 2.1 on arrival. This was 1.9 11/14/2023 (essentially same). Apparently not taking lasix, spironolactone at home. Patient reports that she is not ready for hospice to daughter, but does refuse to take medications. GI consulted and recommending palliative consult and consideration of hospice.  Discussed goals of care with patient and her daughter. The decision was made to enroll in hospice. Stopping lasix/spironolactone indefinitely. Making patient DNR/DNI per discussion goals. IR to place peritoneal drain prior to discharge for drainage of ascites as it reaccumulates.     Disseminated Herpes Zoster  Vesicular rash including dermatomal distribution on both anterior and posterior thorax. Was on valganciclovir outpatient, will change to acyclovir IV inpatient. Isolation needed. ID consulted who recommend continuing acyclovir. LP deferred as patient more awake, and decided to pursue hospice. Transition back to valacyclovir on discharge    Consultations This Hospital Stay   GASTROENTEROLOGY IP CONSULT  PALLIATIVE CARE ADULT IP CONSULT  INFECTIOUS DISEASES IP CONSULT  INTERVENTIONAL RADIOLOGY ADULT/PEDS IP CONSULT  SPIRITUAL HEALTH SERVICES IP CONSULT    Code Status   No CPR- Do NOT Intubate    Time Spent on this Encounter   I, Ovidio Agosto MD, personally saw the patient today and spent less than or equal to 30 minutes discharging this patient.       Ovidio Agosto MD  [place] [address] [facility]  [address] [place] [address]  Phone: [phone]  Fax: [phone]  ______________________________________________________________________    Physical Exam   Vital Signs: Temp: 97  F (36.1  C) Temp src: Oral BP: 118/73 Pulse: 115   Resp: 18 SpO2: 100 % O2 Device: None (Room air)    Weight: 97 lbs 10.62 oz         Primary Care Physician   Mehnaz Gaspar MD    Discharge Orders      Hospice Referral      Reason for your hospital stay    You were admitted to the hospital for confusion related to your hepatic encephalopathy and accidentally ingesting two doses of ativan. Your confusion resolved over the course of 1-2 days. After discussion with you and your daughter, decision was made to admit to hospice care upon discharge.     Follow-up and recommended labs and tests     Follow  up with primary care provider, Mehnaz Gaspar MD, within 7 days for hospital follow up. Could discuss paracentesis schedule or pleurx catheter.     Activity    Your activity upon discharge: activity as tolerated     Diet    Follow this diet upon discharge: Orders Placed This Encounter      Combination Diet Regular Diet; 2 gm NA Diet       Significant Results and Procedures   Most Recent 3 CBC's:  Recent Labs   Lab Test 12/09/23  0650 12/08/23  0904 11/14/23  1238   WBC 6.0 6.2 4.5   HGB 10.1* 10.5* 10.4*   * 100 100   PLT 66* 70* 64*     Most Recent 3 BMP's:  Recent Labs   Lab Test 12/09/23  0650 12/08/23  0904 11/14/23  1238    138 137   POTASSIUM 3.1* 3.2* 3.4   CHLORIDE 111* 105 100   CO2 21* 23 27   BUN 5.9* 5.4* 10.0   CR 0.59 0.50* 0.78   ANIONGAP 10 10 10   CARLOS 7.6* 7.6* 8.1*   * 120* 103*     Most Recent 2 LFT's:  Recent Labs   Lab Test 12/09/23  0650 12/08/23  0904   AST 60* 63*   ALT 30 32   ALKPHOS 137 143   BILITOTAL 2.7* 2.1*     Most Recent 3 INR's:  Recent Labs   Lab Test 12/08/23  0904 09/28/23  1050 08/07/23  1437   INR 1.37* 1.08 1.41*     7-Day Micro Results       Collected Updated Procedure Result Status      12/09/2023 0529 12/09/2023 1252 Enteric Bacteria and Virus Panel PCR [[id number]]    Stool from Per Rectum    Final result Component Value   Campylobacter species Negative   Salmonella species Negative   Vibrio species Negative   Vibrio cholerae Negative   Yersinia enterocolitica Negative   Enteropathogenic E. coli (EPEC) Negative   Shiga-like toxin-producing E. coli (STEC) Negative   Shigella/Enteroinvasive E. coli (EIEC) Negative   Cryptosporidium species Negative   Giardia lamblia Negative   Norovirus Gl/Gll Negative   Rotavirus A Negative   Plesiomonas shigelloides Negative   Enteroaggregative E. coli (EAEC) Negative   Enterotoxigenic E. coli (ETEC) Negative   E. coli O157 NA   Cyclospora cayetanensis Negative   Entamoeba histolytica Negative   Adenovirus  F40/41 Negative   Astrovirus Negative   Sapovirus Negative            12/09/2023 0155 12/11/2023 0937 Urine Culture [[id number]]   Urine, Catheter    Final result Component Value   Culture <10,000 CFU/mL Urogenital purnima    <10,000 CFU/mL Urogenital purnima    <1,000 CFU/mL Urogenital purnima                     Most Recent Urinalysis:  Recent Labs   Lab Test 12/09/23  0155   COLOR Yellow   APPEARANCE Slightly Cloudy*   URINEGLC Negative   URINEBILI Negative   URINEKETONE Negative   SG 1.009   UBLD Negative   URINEPH 7.5*   PROTEIN Negative   NITRITE Negative   LEUKEST Small*   RBCU 1   WBCU 20*   ,   Results for orders placed or performed during the hospital encounter of 12/08/23   CT Head w/o Contrast    Narrative    CT OF THE HEAD WITHOUT CONTRAST   12/8/2023 11:23 AM     COMPARISON: None    HISTORY:  trauma     TECHNIQUE:  Axial CT images of the head from the skull base to the  vertex were acquired without IV contrast.    FINDINGS:   INTRACRANIAL CONTENTS: No intracranial hemorrhage, extraaxial  collection, or mass effect.  No CT evidence of acute infarct.  Extensive burden of supratentorial white matter hypodensity. This may  be due to regions of demyelination. Normal basal ganglia, thalami,  brainstem and cerebellum. Normal ventricles and sulci.    VISUALIZED ORBITS/SINUSES/MASTOIDS: No significant orbital  abnormality.  No significant paranasal sinus mucosal disease. No  significant middle ear or mastoid effusion.    OSSEOUS STRUCTURES/SOFT TISSUES: No significant abnormality.      Impression    IMPRESSION:  1.  No acute posttraumatic abnormality.  2.  Extensive burden of white matter hypodensity which may be due to  regions of demyelination. If further assessment is clinically  indicated a contrast-enhanced head MRI is recommended.    Radiation dose for this scan was reduced using automated exposure  control, adjustment of the mA and/or kV according to patient size, or  iterative reconstruction  technique    PARKER DOBBINS MD         SYSTEM ID:  [id number]   XR Chest Port 1 View    Narrative    CHEST ONE VIEW  12/8/2023 10:54 AM     HISTORY: Fall    COMPARISON: None.      Impression    IMPRESSION: No pneumothorax or definite displaced rib fracture  evident. No acute infiltrates.    MOIRA PERKINS MD         SYSTEM ID:  [id number]   Humerus XR,  G/E 2 views, left    Narrative    XR HUMERUS LEFT G/E 2 VIEWS 12/8/2023 11:32 AM     HISTORY: Trauma. Pain.    COMPARISON: None.       Impression    IMPRESSION:    Normal joint spaces and alignment. No fracture.    DEE GOVEA MD         SYSTEM ID:  [id number]   Radius/Ulna XR,  PA &LAT, left    Narrative    FOREARM LEFT 2 VIEWS   12/8/2023 11:33 AM     HISTORY: Trauma, pain.    COMPARISON: None.      Impression    IMPRESSION: Old healed radius fracture in anatomic position and  alignment with affixing hardware showing no evidence of complication.  No acute or subacute fracture. Otherwise unremarkable.    EH CHISHOLM MD         SYSTEM ID:  [id number]   MR Brain w/o & w Contrast    Narrative    EXAM: MR BRAIN W/O and W CONTRAST  LOCATION: [facility]  DATE: 12/9/2023    INDICATION: Headache encephalopathy.  COMPARISON: CT head 12/08/2023.  CONTRAST: 5 mL Gadavist  TECHNIQUE: Routine multiplanar multisequence head MRI without and with intravenous contrast.    FINDINGS:  INTRACRANIAL CONTENTS: No acute or subacute infarct. No mass, acute hemorrhage, or extra-axial fluid collections. Patchy nonspecific T2/FLAIR hyperintensities within the cerebral white matter involving the deep matter, without involvement of corpus   callosum or juxtacortical white matter. Mild generalized cerebral atrophy. No hydrocephalus. Normal position of the cerebellar tonsils. No pathologic contrast enhancement.    SELLA: No abnormality accounting for technique.    OSSEOUS STRUCTURES/SOFT TISSUES: Normal marrow signal. The major intracranial vascular flow voids are  maintained.     ORBITS: No abnormality accounting for technique.     SINUSES/MASTOIDS: Mild mucosal thickening left sphenoid sinus. No middle ear or mastoid effusion.       Impression    IMPRESSION:  1.  No acute intracranial process.  2.  Bilateral non specific chronic white matter changes of unknown etiology, with a wide differential. Such an appearance can be a sequela of migraine headaches, sequela of previous trauma, demyelinating disease (ADEM, MS), infection (Lyme disease), and   chronic small vessel ischemic disease (found in patients with long-standing hypertension and/or diabetes).       Discharge Medications   Current Discharge Medication List        START taking these medications    Details   valACYclovir (VALTREX) 50 mg/mL SUSP Take 20 mLs (1,000 mg) by mouth 3 times daily for 4 days  Qty: 240 mL, Refills: 0    Associated Diagnoses: Herpes zoster without complication      witch hazel-glycerin (TUCKS) pad Apply topically every hour as needed for hemorrhoids  Qty: 15 each, Refills: 0    Associated Diagnoses: External hemorrhoids           CONTINUE these medications which have CHANGED    Details   lactulose (CHRONULAC) 10 GM/15ML solution Take 15 mLs (10 g) by mouth 3 times daily  Qty: 946 mL, Refills: 0    Associated Diagnoses: Hepatic encephalopathy (H); Herpes zoster without complication; Alcoholic cirrhosis of liver with ascites (H)      pramoxine (PROCTOFOAM) 1 % foam Place rectally daily as needed  Qty: 15 g, Refills: 0    Associated Diagnoses: Hepatic encephalopathy (H); Herpes zoster without complication; Alcoholic cirrhosis of liver with ascites (H)           CONTINUE these medications which have NOT CHANGED    Details   hydrOXYzine HCl (ATARAX) 25 MG tablet Take 25 mg by mouth 2 times daily as needed      LORazepam (ATIVAN) 0.5 MG tablet Take 0.5 mg by mouth daily as needed (paracentesis)      ondansetron (ZOFRAN) 4 MG tablet Take 4 mg by mouth every 8 hours as needed for nausea      rifaximin  (XIFAXAN) 550 MG TABS tablet Take 200 mg by mouth 2 times daily           STOP taking these medications       furosemide (LASIX) 20 MG tablet Comments:   Reason for Stopping:         metoprolol succinate ER (TOPROL XL) 25 MG 24 hr tablet Comments:   Reason for Stopping:         potassium chloride ER (K-TAB) 20 MEQ CR tablet Comments:   Reason for Stopping:         spironolactone (ALDACTONE) 50 MG tablet Comments:   Reason for Stopping:         valACYclovir (VALTREX) 1000 mg tablet Comments:   Reason for Stopping:             Allergies   No Known Allergies

## 2023-12-12 NOTE — PROGRESS NOTES
Shift [id number]    VSS on RA. A&Ox4. Forgetful at times. Lotion applied to shingles lesions. Scabs on L chest. Pt reports 9/10 back/shoulder pain. Ax1 pivot to commode. NPO @ midnight. Plan to discharge on hospice via stretcher transport at 11am tomorrow.

## 2023-12-12 NOTE — PROGRESS NOTES
Care Management Discharge Note    Discharge Date: 12/12/2023       Discharge Disposition:      Discharge Services:      Discharge DME:      Discharge Transportation: family or friend will provide    Private pay costs discussed: Not applicable    Does the patient's insurance plan have a 3 day qualifying hospital stay waiver?  No    PAS Confirmation Code:    Patient/family educated on Medicare website which has current facility and service quality ratings:      Education Provided on the Discharge Plan:    Persons Notified of Discharge Plans: Pt, Family, MD  Patient/Family in Agreement with the Plan: yes    Handoff Referral Completed: No    Additional Information:  Sw following for discharge planning.     Sw faxed discharge orders to hospice agency.     Addendum: Pt's drain placement took longer than anticipated. Sw pushed transport back to [id number]. Updated hospice.     No other needs have been identified at this time.    ELPIDIO Madden, [facility]  Inpatient Care Coordination  [facility]  [phone]      ELPIDIO Madden

## 2025-06-15 NOTE — PROGRESS NOTES
[facility]    Background: Transitional Care Management program identified per system criteria and reviewed by [facility] team for possible outreach.    Assessment: Upon chart review, [facility] Team member will not proceed with patient outreach related to this episode of Transitional Care Management program due to reason below:    Patient has been discharged with Hospice Care    Plan: Transitional Care Management episode addressed appropriately per reason noted above.      СВЕТЛАНА Cordero  [facility]    *Connected Care Resource Team does NOT follow patient ongoing. Referrals are identified based on internal discharge reports and the outreach is to ensure patient has an understanding of their discharge instructions.   - - -

## (undated) RX ORDER — ALBUMIN (HUMAN) 12.5 G/50ML
SOLUTION INTRAVENOUS
Status: DISPENSED
Start: 2023-01-01